# Patient Record
Sex: MALE | Race: BLACK OR AFRICAN AMERICAN
[De-identification: names, ages, dates, MRNs, and addresses within clinical notes are randomized per-mention and may not be internally consistent; named-entity substitution may affect disease eponyms.]

---

## 2017-09-25 ENCOUNTER — HOSPITAL ENCOUNTER (INPATIENT)
Dept: HOSPITAL 61 - ER | Age: 52
LOS: 4 days | Discharge: HOME HEALTH | DRG: 166 | End: 2017-09-29
Attending: INTERNAL MEDICINE | Admitting: INTERNAL MEDICINE
Payer: MEDICARE

## 2017-09-25 VITALS — HEIGHT: 71 IN | WEIGHT: 132 LBS | BODY MASS INDEX: 18.48 KG/M2

## 2017-09-25 VITALS — SYSTOLIC BLOOD PRESSURE: 149 MMHG | DIASTOLIC BLOOD PRESSURE: 100 MMHG

## 2017-09-25 DIAGNOSIS — Z86.11: ICD-10-CM

## 2017-09-25 DIAGNOSIS — K85.20: ICD-10-CM

## 2017-09-25 DIAGNOSIS — R74.8: ICD-10-CM

## 2017-09-25 DIAGNOSIS — K74.60: ICD-10-CM

## 2017-09-25 DIAGNOSIS — R79.89: ICD-10-CM

## 2017-09-25 DIAGNOSIS — K76.0: ICD-10-CM

## 2017-09-25 DIAGNOSIS — R59.0: ICD-10-CM

## 2017-09-25 DIAGNOSIS — R04.2: ICD-10-CM

## 2017-09-25 DIAGNOSIS — R64: ICD-10-CM

## 2017-09-25 DIAGNOSIS — J15.8: ICD-10-CM

## 2017-09-25 DIAGNOSIS — K70.10: ICD-10-CM

## 2017-09-25 DIAGNOSIS — D69.6: ICD-10-CM

## 2017-09-25 DIAGNOSIS — Z82.49: ICD-10-CM

## 2017-09-25 DIAGNOSIS — K21.9: ICD-10-CM

## 2017-09-25 DIAGNOSIS — F10.20: ICD-10-CM

## 2017-09-25 DIAGNOSIS — F12.90: ICD-10-CM

## 2017-09-25 DIAGNOSIS — F17.210: ICD-10-CM

## 2017-09-25 DIAGNOSIS — J44.0: Primary | ICD-10-CM

## 2017-09-25 DIAGNOSIS — I10: ICD-10-CM

## 2017-09-25 DIAGNOSIS — D70.9: ICD-10-CM

## 2017-09-25 DIAGNOSIS — K29.70: ICD-10-CM

## 2017-09-25 DIAGNOSIS — F41.9: ICD-10-CM

## 2017-09-25 DIAGNOSIS — K75.9: ICD-10-CM

## 2017-09-25 DIAGNOSIS — K70.9: ICD-10-CM

## 2017-09-25 LAB
ALBUMIN SERPL-MCNC: 3.9 G/DL (ref 3.4–5)
ALBUMIN/GLOB SERPL: 0.8 {RATIO} (ref 1–1.7)
ALP SERPL-CCNC: 117 U/L (ref 46–116)
ALT SERPL-CCNC: 241 U/L (ref 16–63)
ANION GAP SERPL CALC-SCNC: 14 MMOL/L (ref 6–14)
AST SERPL-CCNC: 456 U/L (ref 15–37)
BASOPHILS # BLD AUTO: 0 X10^3/UL (ref 0–0.2)
BASOPHILS NFR BLD: 1 % (ref 0–3)
BILIRUB SERPL-MCNC: 0.6 MG/DL (ref 0.2–1)
BUN SERPL-MCNC: 6 MG/DL (ref 8–26)
BUN/CREAT SERPL: 9 (ref 6–20)
CALCIUM SERPL-MCNC: 8.8 MG/DL (ref 8.5–10.1)
CHLORIDE SERPL-SCNC: 94 MMOL/L (ref 98–107)
CO2 SERPL-SCNC: 28 MMOL/L (ref 21–32)
CREAT SERPL-MCNC: 0.7 MG/DL (ref 0.7–1.3)
EOSINOPHIL NFR BLD AUTO: 1 % (ref 0–5)
EOSINOPHIL NFR BLD: 1 % (ref 0–3)
ERYTHROCYTE [DISTWIDTH] IN BLOOD BY AUTOMATED COUNT: 14 % (ref 11.5–14.5)
GFR SERPLBLD BASED ON 1.73 SQ M-ARVRAT: 142.7 ML/MIN
GLOBULIN SER-MCNC: 4.7 G/DL (ref 2.2–3.8)
GLUCOSE SERPL-MCNC: 61 MG/DL (ref 70–99)
HCT VFR BLD CALC: 41.4 % (ref 39–53)
HGB BLD-MCNC: 14.2 G/DL (ref 13–17.5)
INR PPP: 0.9 (ref 0.8–1.1)
LYMPHOCYTES # BLD: 0.9 X10^3/UL (ref 1–4.8)
LYMPHOCYTES NFR BLD AUTO: 38 % (ref 24–48)
MCH RBC QN AUTO: 33 PG (ref 25–35)
MCHC RBC AUTO-ENTMCNC: 34 G/DL (ref 31–37)
MCV RBC AUTO: 96 FL (ref 79–100)
MONOCYTES NFR BLD: 19 % (ref 0–9)
NEUTROPHILS NFR BLD AUTO: 42 % (ref 31–73)
PLATELET # BLD AUTO: 76 X10^3/UL (ref 140–400)
PLATELET # BLD EST: (no result) 10*3/UL
POTASSIUM SERPL-SCNC: 4.4 MMOL/L (ref 3.5–5.1)
PROT SERPL-MCNC: 8.6 G/DL (ref 6.4–8.2)
PROTHROMBIN TIME: 12 SEC (ref 11.7–14)
RBC # BLD AUTO: 4.31 X10^6/UL (ref 4.3–5.7)
SODIUM SERPL-SCNC: 136 MMOL/L (ref 136–145)
WBC # BLD AUTO: 2.3 X10^3/UL (ref 4–11)

## 2017-09-25 PROCEDURE — 85025 COMPLETE CBC W/AUTO DIFF WBC: CPT

## 2017-09-25 PROCEDURE — 87015 SPECIMEN INFECT AGNT CONCNTJ: CPT

## 2017-09-25 PROCEDURE — 82150 ASSAY OF AMYLASE: CPT

## 2017-09-25 PROCEDURE — 94640 AIRWAY INHALATION TREATMENT: CPT

## 2017-09-25 PROCEDURE — 80074 ACUTE HEPATITIS PANEL: CPT

## 2017-09-25 PROCEDURE — 88312 SPECIAL STAINS GROUP 1: CPT

## 2017-09-25 PROCEDURE — 88305 TISSUE EXAM BY PATHOLOGIST: CPT

## 2017-09-25 PROCEDURE — 85610 PROTHROMBIN TIME: CPT

## 2017-09-25 PROCEDURE — 80048 BASIC METABOLIC PNL TOTAL CA: CPT

## 2017-09-25 PROCEDURE — 85651 RBC SED RATE NONAUTOMATED: CPT

## 2017-09-25 PROCEDURE — 85027 COMPLETE CBC AUTOMATED: CPT

## 2017-09-25 PROCEDURE — 86703 HIV-1/HIV-2 1 RESULT ANTBDY: CPT

## 2017-09-25 PROCEDURE — 31622 DX BRONCHOSCOPE/WASH: CPT

## 2017-09-25 PROCEDURE — 71010: CPT

## 2017-09-25 PROCEDURE — 87205 SMEAR GRAM STAIN: CPT

## 2017-09-25 PROCEDURE — 87535 HIV-1 PROBE&REVERSE TRNSCRPJ: CPT

## 2017-09-25 PROCEDURE — 85007 BL SMEAR W/DIFF WBC COUNT: CPT

## 2017-09-25 PROCEDURE — 87116 MYCOBACTERIA CULTURE: CPT

## 2017-09-25 PROCEDURE — 71275 CT ANGIOGRAPHY CHEST: CPT

## 2017-09-25 PROCEDURE — 86713 LEGIONELLA ANTIBODY: CPT

## 2017-09-25 PROCEDURE — 76705 ECHO EXAM OF ABDOMEN: CPT

## 2017-09-25 PROCEDURE — 83690 ASSAY OF LIPASE: CPT

## 2017-09-25 PROCEDURE — 88112 CYTOPATH CELL ENHANCE TECH: CPT

## 2017-09-25 PROCEDURE — 93005 ELECTROCARDIOGRAM TRACING: CPT

## 2017-09-25 PROCEDURE — 86702 HIV-2 ANTIBODY: CPT

## 2017-09-25 PROCEDURE — 86701 HIV-1ANTIBODY: CPT

## 2017-09-25 PROCEDURE — 80053 COMPREHEN METABOLIC PANEL: CPT

## 2017-09-25 PROCEDURE — 87102 FUNGUS ISOLATION CULTURE: CPT

## 2017-09-25 PROCEDURE — 36415 COLL VENOUS BLD VENIPUNCTURE: CPT

## 2017-09-25 PROCEDURE — 87252 VIRUS INOCULATION TISSUE: CPT

## 2017-09-25 NOTE — PHYS DOC
Past Medical History


Past Medical History:  Hypertension, TB


Past Surgical History:  No Surgical History


Alcohol Use:  Occasionally


Drug Use:  None





Adult General


Chief Complaint


Chief Complaint:  COUGH





HPI


HPI





Patient is a 51  year old M who presents with coughing up blood. Patient states 

that he start coughing up blood earlier this morning as been productive all 

day. Patient states he's coughing clumps of bright red blood and dark blood. 

Patient has a history of TB and was treated for it. Patient complains of some 

shortness of breath associated with this. Patient denies any chest pain. 

Patient saw on any blood thinners. Patient denies any nausea/vomiting/diarrhea. 

Patient has no other complaints.





Review of Systems


Review of Systems


GEN: Denies fevers, chills, sweats


HEENT: Denies blurred vision, sore throat


CV: Denies chest pain


RESP: Cough up blood


GI: Denies n/v/d


NEURO: Denies confusion, dizziness


MSK: Denies weakness, joint pain/swelling





Current Medications


Current Medications





Current Medications








 Medications


  (Trade)  Dose


 Ordered  Sig/Yg  Start Time


 Stop Time Status Last Admin


Dose Admin


 


 Info


  (Do NOT chart on


 this entry -- for


 MONITORING)  1 each  PRN DAILY  PRN  9/25/17 19:30


 9/27/17 19:29   


 


 


 Iohexol


  (Omnipaque 300


 Mg/ml)  75 ml  1X  ONCE  9/25/17 19:30


 9/25/17 19:31 DC 9/25/17 21:33


75 ML











Allergies


Allergies





Allergies








Coded Allergies Type Severity Reaction Last Updated Verified


 


  No Known Drug Allergies    9/25/17 No











Physical Exam


Physical Exam


GEN.:    No apparent distress.  Alert and oriented.


HEENT:    Head is normocephalic, atraumatic


NECK:    Supple.  


LUNGS:    Coarse breath sounds bilaterally.


HEART:    RRR, S1, S2 present.  Peripheral pulses intact


ABDOMEN:    Soft, nontender.  Positive bowel sounds.


EXTREMITIES:    Without any cyanosis.    


NEUROLOGIC:     Normal speech, normal tone


PSYCHIATRIC:    Normal affect, normal mood.


SKIN:   No ulcerations





Current Patient Data


Vital Signs





 Vital Signs








  Date Time  Temp Pulse Resp B/P (MAP) Pulse Ox O2 Delivery O2 Flow Rate FiO2


 


9/25/17 18:18 98.2 99 20 166/116 (133) 95 Room Air  





 98.2       








Lab Values





 Laboratory Tests








Test


  9/25/17


19:50 9/25/17


21:00


 


White Blood Count


  2.3 x10^3/uL


(4.0-11.0)  L 


 


 


Red Blood Count


  4.31 x10^6/uL


(4.30-5.70) 


 


 


Hemoglobin


  14.2 g/dL


(13.0-17.5) 


 


 


Hematocrit


  41.4 %


(39.0-53.0) 


 


 


Mean Corpuscular Volume


  96 fL ()


  


 


 


Mean Corpuscular Hemoglobin 33 pg (25-35)   


 


Mean Corpuscular Hemoglobin


Concent 34 g/dL


(31-37) 


 


 


Red Cell Distribution Width


  14.0 %


(11.5-14.5) 


 


 


Platelet Count


  76 x10^3/uL


(140-400)  L 


 


 


Neutrophils (%) (Auto) 42 % (31-73)   


 


Lymphocytes (%) (Auto) 38 % (24-48)   


 


Monocytes (%) (Auto) 19 % (0-9)  H 


 


Eosinophils (%) (Auto) 1 % (0-3)   


 


Basophils (%) (Auto) 1 % (0-3)   


 


Neutrophils # (Auto)


  1.0 x10^3uL


(1.8-7.7)  L 


 


 


Lymphocytes # (Auto)


  0.9 x10^3/uL


(1.0-4.8)  L 


 


 


Monocytes # (Auto)


  0.4 x10^3/uL


(0.0-1.1) 


 


 


Eosinophils # (Auto)


  0.0 x10^3/uL


(0.0-0.7) 


 


 


Basophils # (Auto)


  0.0 x10^3/uL


(0.0-0.2) 


 


 


Segmented Neutrophils % 49 % (35-66)   


 


Lymphocytes % 38 % (24-48)   


 


Atypical Lymphocytes %


(Manual) 1 % (0-0)  H


  


 


 


Monocytes % 11 % (0-10)  H 


 


Eosinophils % 1 % (0-5)   


 


Platelet Estimate


  Decreased


(ADEQUATE) 


 


 


Prothrombin Time


  12.0 SEC


(11.7-14.0) 


 


 


Prothrombin Time INR 0.9 (0.8-1.1)   


 


Sodium Level


  


  136 mmol/L


(136-145)


 


Potassium Level


  


  4.4 mmol/L


(3.5-5.1)


 


Chloride Level


  


  94 mmol/L


()  L


 


Carbon Dioxide Level


  


  28 mmol/L


(21-32)


 


Anion Gap  14 (6-14)  


 


Blood Urea Nitrogen


  


  6 mg/dL (8-26)


L


 


Creatinine


  


  0.7 mg/dL


(0.7-1.3)


 


Estimated GFR


(Cockcroft-Gault) 


  142.7  


 


 


BUN/Creatinine Ratio  9 (6-20)  


 


Glucose Level


  


  61 mg/dL


(70-99)  L


 


Calcium Level


  


  8.8 mg/dL


(8.5-10.1)


 


Total Bilirubin


  


  0.6 mg/dL


(0.2-1.0)


 


Aspartate Amino Transferase


(AST) 


  456 U/L


(15-37)  H


 


Alanine Aminotransferase (ALT)


  


  241 U/L


(16-63)  H


 


Alkaline Phosphatase


  


  117 U/L


()  H


 


Total Protein


  


  8.6 g/dL


(6.4-8.2)  H


 


Albumin


  


  3.9 g/dL


(3.4-5.0)


 


Albumin/Globulin Ratio


  


  0.8 (1.0-1.7)


L


 


Lipase


  


  410 U/L


()  H





 Laboratory Tests


9/25/17 19:50








 Laboratory Tests


9/25/17 21:00














EKG


EKG


1951: EKG shows normal sinus rhythm rate of 75 no STEMI[]





Radiology/Procedures


Radiology/Procedures


CT chest:


IMPRESSION:


1. 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


2. There is masslike consolidation at the right lung apex with areas of 


central air cavitation, progressed since the prior examination from July 4, 2013. There is volume loss and scarring involving the right lung with 


multiple parenchymal nodular opacities. Findings may be seen in the 


setting of aspergillosis or secondary tuberculosis. Alternate etiology 


would be a primary lung malignancy. Additional considerations may include 


an inflammatory process such as a pneumoconiosis/silicosis with 


superimposed fungal infection. Hyperdense mediastinal lymphadenopathy is 


present.


3. Diffuse hepatic steatosis.





Chest x-ray shows right upper lobe cavitary lesion





Course & Med Decision Making


Course & Med Decision Making


Pertinent Labs and Imaging studies reviewed. (See chart for details)





ED course:


Patient was seen and examined emergency room CBC, CMP, troponin, EKG, chest x-

ray, CT scan of the chest was ordered


2230: Patient was updated on CT findings and plan to admit for possible active 

TB


2240: Discussed CC/HP/PMH with Dr. Holden and recommends admit and place 

infectious disease and pulmonary consult


2248: Discussed CC/HP/PMH with Dr. Schaefer and recommends admit to medicine





[]





Dragon Disclaimer


Dragon Disclaimer


This electronic medical record was generated, in whole or in part, using a 

voice recognition dictation system.





Departure


Departure


Impression:  


 Primary Impression:  


 Active tuberculosis


 Additional Impression:  


 Hemoptysis


Disposition:  09 ADMITTED AS INPATIENT


Admitting Physician:  Martin Holden


Condition:  STABLE


Referrals:  


GAEL HARDIN MD (PCP)





Problem Qualifiers











EMEKA PRICE DO Sep 25, 2017 20:06

## 2017-09-25 NOTE — RAD
PQRS Compliance Statement:

 

One or more of the following individualized dose reduction techniques were

utilized for this examination:  

1. Automated exposure control  

2. Adjustment of the mA and/or kV according to patient size  

3. Use of iterative reconstruction technique

 

CT angiography chest 9/25/2017 at 9:34 PM

 

INDICATION: Hemoptysis

 

COMPARISON: Chest CT July 4, 2013

 

TECHNIQUE: Multiple axial CT images of the chest were obtained after 

intravenous administration of 75 mL Omnipaque 300. Coronal and sagittal 

reformats are provided. Maximum intensity projection images are provided.

 

FINDINGS:

 

The thyroid gland is normal in appearance.

 

There are no pathologically enlarged or abnormally enhancing axillary, 

mediastinal or hilar lymph nodes. Calcified right hilar lymphadenopathy is

present. Right paratracheal hyperdense lymphadenopathy measuring 3.1 x 1.8

cm (series 3, image 51 and measuring 3.1 x 1.5 cm on series 3, image 60). 

Additional region measures 2.6 x 1.9 cm (series 3, image 67). 

 

There is adequate opacification of the pulmonary arterial system. There is

complete occlusion of the right upper lobe are pulmonary artery. Thoracic 

aorta is normal in course and caliber. Heart size is within normal limits.

No pericardial effusion.

 

There is masslike consolidation at the right lung apex with central area 

of cavitation. There is an adjacent area of nodular parenchymal mass 

measuring 2.7 x 1.7 cm. There is moderate to advanced pulmonary emphysema 

involving the right lung. There is volume loss and scarring in the right 

lung. Multiple scattered nodular opacities are identified, noncalcified. 

Mild centrilobular emphysema is noted in the left lung with compensatory 

expansion. No pleural effusions are identified. No pulmonary vascular 

congestion or pneumothorax.

 

Hypoattenuation of the hepatic parenchyma suggestive of diffuse hepatic 

steatosis. Spleen, bilateral adrenal glands and visualized portions of the

kidneys and pancreas are normal in appearance.

 

No suspicious osseous lesions are identified.

 

IMPRESSION:

1. 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

2. There is masslike consolidation at the right lung apex with areas of 

central air cavitation, progressed since the prior examination from July 4, 2013. There is volume loss and scarring involving the right lung with 

multiple parenchymal nodular opacities. Findings may be seen in the 

setting of aspergillosis or secondary tuberculosis. Alternate etiology 

would be a primary lung malignancy. Additional considerations may include 

an inflammatory process such as a pneumoconiosis/silicosis with 

superimposed fungal infection. Hyperdense mediastinal lymphadenopathy is 

present.

3. Diffuse hepatic steatosis.

 

**********FOR INTERNAL CODING PURPOSES**********

 

Critical result:

 

Findings discussed with EMEKA PRICE at 9/25/2017 10:02 PM.

 

RESULT CODE: (C)

 

Electronically signed by: Alba Gan MD (9/25/2017 10:21 PM) 

[facility]

## 2017-09-26 VITALS — DIASTOLIC BLOOD PRESSURE: 84 MMHG | SYSTOLIC BLOOD PRESSURE: 120 MMHG

## 2017-09-26 VITALS — DIASTOLIC BLOOD PRESSURE: 124 MMHG | SYSTOLIC BLOOD PRESSURE: 165 MMHG

## 2017-09-26 VITALS — SYSTOLIC BLOOD PRESSURE: 146 MMHG | DIASTOLIC BLOOD PRESSURE: 114 MMHG

## 2017-09-26 VITALS — SYSTOLIC BLOOD PRESSURE: 143 MMHG | DIASTOLIC BLOOD PRESSURE: 104 MMHG

## 2017-09-26 VITALS — DIASTOLIC BLOOD PRESSURE: 110 MMHG | SYSTOLIC BLOOD PRESSURE: 162 MMHG

## 2017-09-26 LAB
ANION GAP SERPL CALC-SCNC: 17 MMOL/L (ref 6–14)
BASOPHILS # BLD AUTO: 0 X10^3/UL (ref 0–0.2)
BASOPHILS NFR BLD: 1 % (ref 0–3)
BUN SERPL-MCNC: 8 MG/DL (ref 8–26)
CALCIUM SERPL-MCNC: 8.9 MG/DL (ref 8.5–10.1)
CHLORIDE SERPL-SCNC: 94 MMOL/L (ref 98–107)
CO2 SERPL-SCNC: 26 MMOL/L (ref 21–32)
CREAT SERPL-MCNC: 0.7 MG/DL (ref 0.7–1.3)
EOSINOPHIL NFR BLD: 0 % (ref 0–3)
ERYTHROCYTE [DISTWIDTH] IN BLOOD BY AUTOMATED COUNT: 14 % (ref 11.5–14.5)
GFR SERPLBLD BASED ON 1.73 SQ M-ARVRAT: 142.7 ML/MIN
GLUCOSE SERPL-MCNC: 68 MG/DL (ref 70–99)
HCT VFR BLD CALC: 42.1 % (ref 39–53)
HGB BLD-MCNC: 14.3 G/DL (ref 13–17.5)
LYMPHOCYTES # BLD: 0.5 X10^3/UL (ref 1–4.8)
LYMPHOCYTES NFR BLD AUTO: 13 % (ref 24–48)
MCH RBC QN AUTO: 33 PG (ref 25–35)
MCHC RBC AUTO-ENTMCNC: 34 G/DL (ref 31–37)
MCV RBC AUTO: 97 FL (ref 79–100)
MONOCYTES NFR BLD: 13 % (ref 0–9)
NEUTROPHILS NFR BLD AUTO: 73 % (ref 31–73)
PLATELET # BLD AUTO: 39 X10^3/UL (ref 140–400)
POTASSIUM SERPL-SCNC: 4 MMOL/L (ref 3.5–5.1)
RBC # BLD AUTO: 4.35 X10^6/UL (ref 4.3–5.7)
SODIUM SERPL-SCNC: 137 MMOL/L (ref 136–145)
WBC # BLD AUTO: 3.9 X10^3/UL (ref 4–11)

## 2017-09-26 RX ADMIN — PANTOPRAZOLE SODIUM SCH MG: 40 TABLET, DELAYED RELEASE ORAL at 10:00

## 2017-09-26 RX ADMIN — ONDANSETRON PRN MG: 2 INJECTION INTRAMUSCULAR; INTRAVENOUS at 17:38

## 2017-09-26 RX ADMIN — MORPHINE SULFATE PRN MG: 4 INJECTION, SOLUTION INTRAMUSCULAR; INTRAVENOUS at 20:50

## 2017-09-26 RX ADMIN — ONDANSETRON PRN MG: 2 INJECTION INTRAMUSCULAR; INTRAVENOUS at 00:20

## 2017-09-26 RX ADMIN — MORPHINE SULFATE PRN MG: 4 INJECTION, SOLUTION INTRAMUSCULAR; INTRAVENOUS at 00:15

## 2017-09-26 NOTE — EKG
[facility]

              [address] [place] [address]

Test Date:    2017               Test Time:    19:43:28

Pat Name:     CHRISTOPHER HUBBARD             Department:   

Patient ID:   [id number]           Room:         [facility]

Gender:       M                        Technician:   

:          [id number]               Requested By: EMEKA PRICE

Order Number: [id number]            Reading MD:   Lamine Fritz

                                 Measurements

Intervals                              Axis          

Rate:         75                       P:            67

NV:           182                      QRS:          110

QRSD:         88                       T:            80

QT:           412                                    

QTc:          463                                    

                           Interpretive Statements

SINUS RHYTHM

RIGHTWARD AXIS

S1,S2,S3 PATTERN

QRS(T) CONTOUR ABNORMALITY

CANNOT RULE OUT ANTEROSEPTAL MYOCARDIAL DAMAGE

T ABNORMALITY IN ANTERIOR LEADS

RI6.01          Unconfirmed report

Compared to ECG 2013 14:24:27

T-wave abnormality now present



Electronically Signed On 10-6-2017 12:35:12 CDT by Lamine Fritz

## 2017-09-26 NOTE — RAD
Examination: Frontal view of the chest 



 history: History of cough, hemoptysis.



 Comparison: 7/3/2013



Findings:



Masslike consolidation identified in the right apical lung with questionable

area of central cavitation. Volume loss and scarring identified in the right

lower lobe of the lung.



Faint scattered nodules identified in the bilateral lungs.



Hyperinflated lungs likely emphysematous changes



Impression:



1. Region of opacification identified in the right upper lobe of the lung

could be volume loss or consolidation or chronic infectious etiology grossly

similar to prior exam from 2013.

## 2017-09-26 NOTE — PDOC1
History and Physical


Date of Admission


Date of Admission


9/25/17





Identification/Chief Complaint


Chief Complaint


coughing up blood


Problems:  





Source


Source:  Chart review, Patient





History of Present Illness


History of Present Illness


Patient is a 51  year old M who presents with coughing up blood. Patient states 

that he start coughing up blood earlier this morning as been productive all 

day. Patient states he's coughing clumps of bright red blood and dark blood. 

Patient has a history of TB and was treated for it for 9 month in the past. 

Patient complains of some shortness of breath associated with this. Patient 

denies any chest pain. Patient not on any blood thinners. Patient denies any 

nausea/vomiting/diarrhea. he continue to smoke , he reports some wt loss even 

though he always has been thin





Past Medical History


Cardiovascular:  HTN


Pulmonary:  COPD, Other (TB)


CENTRAL NERVOUS SYSTEM:  Other (tremors and anxiety)


GI:  GERD


Heme/Onc:  No pertinent hx


Hepatobiliary:  Other (early cirrhosis was valuated at hepatology clinic at , 

alcoholic liver disease)


Psych:  Anxiety


Rheumatologic:  No pertinent hx


Infectious disease:  Other (TB)


ENT:  No pertinent hx


Renal/:  No pertinent hx


Endocrine:  No pertinent hx


Dermatology:  No pertinent hx





Past Surgical History


Past Surgical History:  No pertinent history





Family History


Family History:  Hypertension





Social History


Smoke:  1 pack per day


ALCOHOL:  heavy


Drugs:  Marijuana





Current Problem List


Problem List


Problems


Medical Problems:


(1) Active tuberculosis


Status: Acute  





(2) Hemoptysis


Status: Acute  











Current Medications


Current Medications





Current Medications








 Medications


  (Trade)  Dose


 Ordered  Sig/Yg  Start Time


 Stop Time Status Last Admin


Dose Admin


 


 Info


  (Do NOT chart on


 this entry -- for


 MONITORING)  1 each  PRN DAILY  PRN  9/25/17 19:30


 9/27/17 19:29   


 


 


 Iohexol


  (Omnipaque 300


 Mg/ml)  75 ml  1X  ONCE  9/25/17 19:30


 9/25/17 19:31 DC 9/25/17 21:33


75 ML


 


 Morphine Sulfate  4 mg  PRN Q2HR  PRN  9/25/17 23:00


 9/26/17 22:59  9/26/17 00:15


4 MG


 


 Ondansetron HCl


  (Zofran)  4 mg  PRN Q8HRS  PRN  9/25/17 23:00


 9/26/17 22:59  9/26/17 00:20


4 MG











Allergies


Allergies





Allergies








Coded Allergies Type Severity Reaction Last Updated Verified


 


  No Known Drug Allergies    9/25/17 No











ROS


Review of System


CONSTITUTIONAL:        No fever or chills


EYES:                          No recent changes


SKIN:               No rash or itching


CARDIOVASCULAR:     No chest pain, syncope, palpitations, or edema


RESPIRATORY:            + cough and hemoptysis


GASTROINTESTINAL:    No nausea, vomiting some epigastric pain and decrease 

appetite


NEUROLOGICAL:          No headaches or weakness


ENDOCRINE:               No cold or heat intolerance


GENITOURINARY:         No urgency or frequency of urination


MUSCULOSKELETAL:   No back pain or joint pain


LYMPHATICS:               No enlarged lymph nodes





Physical Exam


Physical Exam


GEN.:    No apparent distress.  Alert and oriented.


HEENT:    Head is normocephalic, atraumatic


NECK:    Supple.  


LUNGS:    decrease BS


HEART:    RRR, S1, S2 present.  Peripheral pulses intact


ABDOMEN:    Soft, tender in epigastric and LUQ area.  Positive bowel sounds.


EXTREMITIES:    Without any cyanosis.    


NEUROLOGIC:     Normal speech, normal tone


PSYCHIATRIC:    Normal affect, normal mood.


SKIN:   No ulcerations





Vitals


Vitals





Vital Signs








  Date Time  Temp Pulse Resp B/P (MAP) Pulse Ox O2 Delivery O2 Flow Rate FiO2


 


9/26/17 07:20 97.9 81 18 143/104 (117) 96 Room Air  





 97.9       











Labs


Labs





Laboratory Tests








Test


  9/25/17


19:50 9/25/17


21:00 9/26/17


08:15


 


White Blood Count


  2.3 x10^3/uL


(4.0-11.0) 


  


 


 


Red Blood Count


  4.31 x10^6/uL


(4.30-5.70) 


  


 


 


Hemoglobin


  14.2 g/dL


(13.0-17.5) 


  


 


 


Hematocrit


  41.4 %


(39.0-53.0) 


  


 


 


Mean Corpuscular Volume 96 fL ()   


 


Mean Corpuscular Hemoglobin 33 pg (25-35)   


 


Mean Corpuscular Hemoglobin


Concent 34 g/dL


(31-37) 


  


 


 


Red Cell Distribution Width


  14.0 %


(11.5-14.5) 


  


 


 


Platelet Count


  76 x10^3/uL


(140-400) 


  


 


 


Neutrophils (%) (Auto) 42 % (31-73)   


 


Lymphocytes (%) (Auto) 38 % (24-48)   


 


Monocytes (%) (Auto) 19 % (0-9)   


 


Eosinophils (%) (Auto) 1 % (0-3)   


 


Basophils (%) (Auto) 1 % (0-3)   


 


Neutrophils # (Auto)


  1.0 x10^3uL


(1.8-7.7) 


  


 


 


Lymphocytes # (Auto)


  0.9 x10^3/uL


(1.0-4.8) 


  


 


 


Monocytes # (Auto)


  0.4 x10^3/uL


(0.0-1.1) 


  


 


 


Eosinophils # (Auto)


  0.0 x10^3/uL


(0.0-0.7) 


  


 


 


Basophils # (Auto)


  0.0 x10^3/uL


(0.0-0.2) 


  


 


 


Segmented Neutrophils % 49 % (35-66)   


 


Lymphocytes % 38 % (24-48)   


 


Atypical Lymphocytes %


(Manual) 1 % (0-0) 


  


  


 


 


Monocytes % 11 % (0-10)   


 


Eosinophils % 1 % (0-5)   


 


Platelet Estimate


  Decreased


(ADEQUATE) 


  


 


 


Prothrombin Time


  12.0 SEC


(11.7-14.0) 


  


 


 


Prothromb Time International


Ratio 0.9 (0.8-1.1) 


  


  


 


 


Sodium Level


  


  136 mmol/L


(136-145) 137 mmol/L


(136-145)


 


Potassium Level


  


  4.4 mmol/L


(3.5-5.1) 4.0 mmol/L


(3.5-5.1)


 


Chloride Level


  


  94 mmol/L


() 94 mmol/L


()


 


Carbon Dioxide Level


  


  28 mmol/L


(21-32) 26 mmol/L


(21-32)


 


Anion Gap  14 (6-14)  17 (6-14) 


 


Blood Urea Nitrogen  6 mg/dL (8-26)  8 mg/dL (8-26) 


 


Creatinine


  


  0.7 mg/dL


(0.7-1.3) 0.7 mg/dL


(0.7-1.3)


 


Estimated GFR


(Cockcroft-Gault) 


  142.7 


  142.7 


 


 


BUN/Creatinine Ratio  9 (6-20)  


 


Glucose Level


  


  61 mg/dL


(70-99) 68 mg/dL


(70-99)


 


Calcium Level


  


  8.8 mg/dL


(8.5-10.1) 8.9 mg/dL


(8.5-10.1)


 


Total Bilirubin


  


  0.6 mg/dL


(0.2-1.0) 


 


 


Aspartate Amino Transf


(AST/SGOT) 


  456 U/L


(15-37) 


 


 


Alanine Aminotransferase


(ALT/SGPT) 


  241 U/L


(16-63) 


 


 


Alkaline Phosphatase


  


  117 U/L


() 


 


 


Total Protein


  


  8.6 g/dL


(6.4-8.2) 


 


 


Albumin


  


  3.9 g/dL


(3.4-5.0) 


 


 


Albumin/Globulin Ratio  0.8 (1.0-1.7)  


 


Lipase


  


  410 U/L


() 


 








Laboratory Tests








Test


  9/25/17


19:50 9/25/17


21:00 9/26/17


08:15


 


White Blood Count


  2.3 x10^3/uL


(4.0-11.0) 


  


 


 


Red Blood Count


  4.31 x10^6/uL


(4.30-5.70) 


  


 


 


Hemoglobin


  14.2 g/dL


(13.0-17.5) 


  


 


 


Hematocrit


  41.4 %


(39.0-53.0) 


  


 


 


Mean Corpuscular Volume 96 fL ()   


 


Mean Corpuscular Hemoglobin 33 pg (25-35)   


 


Mean Corpuscular Hemoglobin


Concent 34 g/dL


(31-37) 


  


 


 


Red Cell Distribution Width


  14.0 %


(11.5-14.5) 


  


 


 


Platelet Count


  76 x10^3/uL


(140-400) 


  


 


 


Neutrophils (%) (Auto) 42 % (31-73)   


 


Lymphocytes (%) (Auto) 38 % (24-48)   


 


Monocytes (%) (Auto) 19 % (0-9)   


 


Eosinophils (%) (Auto) 1 % (0-3)   


 


Basophils (%) (Auto) 1 % (0-3)   


 


Neutrophils # (Auto)


  1.0 x10^3uL


(1.8-7.7) 


  


 


 


Lymphocytes # (Auto)


  0.9 x10^3/uL


(1.0-4.8) 


  


 


 


Monocytes # (Auto)


  0.4 x10^3/uL


(0.0-1.1) 


  


 


 


Eosinophils # (Auto)


  0.0 x10^3/uL


(0.0-0.7) 


  


 


 


Basophils # (Auto)


  0.0 x10^3/uL


(0.0-0.2) 


  


 


 


Segmented Neutrophils % 49 % (35-66)   


 


Lymphocytes % 38 % (24-48)   


 


Atypical Lymphocytes %


(Manual) 1 % (0-0) 


  


  


 


 


Monocytes % 11 % (0-10)   


 


Eosinophils % 1 % (0-5)   


 


Platelet Estimate


  Decreased


(ADEQUATE) 


  


 


 


Prothrombin Time


  12.0 SEC


(11.7-14.0) 


  


 


 


Prothromb Time International


Ratio 0.9 (0.8-1.1) 


  


  


 


 


Sodium Level


  


  136 mmol/L


(136-145) 137 mmol/L


(136-145)


 


Potassium Level


  


  4.4 mmol/L


(3.5-5.1) 4.0 mmol/L


(3.5-5.1)


 


Chloride Level


  


  94 mmol/L


() 94 mmol/L


()


 


Carbon Dioxide Level


  


  28 mmol/L


(21-32) 26 mmol/L


(21-32)


 


Anion Gap  14 (6-14)  17 (6-14) 


 


Blood Urea Nitrogen  6 mg/dL (8-26)  8 mg/dL (8-26) 


 


Creatinine


  


  0.7 mg/dL


(0.7-1.3) 0.7 mg/dL


(0.7-1.3)


 


Estimated GFR


(Cockcroft-Gault) 


  142.7 


  142.7 


 


 


BUN/Creatinine Ratio  9 (6-20)  


 


Glucose Level


  


  61 mg/dL


(70-99) 68 mg/dL


(70-99)


 


Calcium Level


  


  8.8 mg/dL


(8.5-10.1) 8.9 mg/dL


(8.5-10.1)


 


Total Bilirubin


  


  0.6 mg/dL


(0.2-1.0) 


 


 


Aspartate Amino Transf


(AST/SGOT) 


  456 U/L


(15-37) 


 


 


Alanine Aminotransferase


(ALT/SGPT) 


  241 U/L


(16-63) 


 


 


Alkaline Phosphatase


  


  117 U/L


() 


 


 


Total Protein


  


  8.6 g/dL


(6.4-8.2) 


 


 


Albumin


  


  3.9 g/dL


(3.4-5.0) 


 


 


Albumin/Globulin Ratio  0.8 (1.0-1.7)  


 


Lipase


  


  410 U/L


() 


 











VTE Prophylaxis Ordered


VTE Prophylaxis Devices:  Yes


VTE Pharmacological Prophylaxi:  Yes





Assessment/Plan


Assessment/Plan


1- lung mass with cavitation


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


3- hepatitis ? alcoholic vs viral , check lab and HIV


4-weight loss


5-neutropenia and thrombocytopenia


6-Gastritis start PPI and antiemetics 


consult ID and pulonary , symptomatic treatment and DT prophylaxis











TROY HAMMER MD Sep 26, 2017 09:47

## 2017-09-26 NOTE — PDOC
Infectious Disease Note


Vital Sign


Vital Signs





Vital Signs








  Date Time  Temp Pulse Resp B/P (MAP) Pulse Ox O2 Delivery O2 Flow Rate FiO2


 


9/26/17 07:20 97.9 81 18 143/104 (117) 96 Room Air  





 97.9       











Labs


Lab





Laboratory Tests








Test


  9/25/17


19:50 9/25/17


21:00 9/26/17


08:15


 


White Blood Count


  2.3 x10^3/uL


(4.0-11.0) 


  3.9 x10^3/uL


(4.0-11.0)


 


Red Blood Count


  4.31 x10^6/uL


(4.30-5.70) 


  4.35 x10^6/uL


(4.30-5.70)


 


Hemoglobin


  14.2 g/dL


(13.0-17.5) 


  14.3 g/dL


(13.0-17.5)


 


Hematocrit


  41.4 %


(39.0-53.0) 


  42.1 %


(39.0-53.0)


 


Mean Corpuscular Volume 96 fL ()   97 fL () 


 


Mean Corpuscular Hemoglobin 33 pg (25-35)   33 pg (25-35) 


 


Mean Corpuscular Hemoglobin


Concent 34 g/dL


(31-37) 


  34 g/dL


(31-37)


 


Red Cell Distribution Width


  14.0 %


(11.5-14.5) 


  14.0 %


(11.5-14.5)


 


Platelet Count


  76 x10^3/uL


(140-400) 


  39 x10^3/uL


(140-400)


 


Neutrophils (%) (Auto) 42 % (31-73)   73 % (31-73) 


 


Lymphocytes (%) (Auto) 38 % (24-48)   13 % (24-48) 


 


Monocytes (%) (Auto) 19 % (0-9)   13 % (0-9) 


 


Eosinophils (%) (Auto) 1 % (0-3)   0 % (0-3) 


 


Basophils (%) (Auto) 1 % (0-3)   1 % (0-3) 


 


Neutrophils # (Auto)


  1.0 x10^3uL


(1.8-7.7) 


  2.8 x10^3uL


(1.8-7.7)


 


Lymphocytes # (Auto)


  0.9 x10^3/uL


(1.0-4.8) 


  0.5 x10^3/uL


(1.0-4.8)


 


Monocytes # (Auto)


  0.4 x10^3/uL


(0.0-1.1) 


  0.5 x10^3/uL


(0.0-1.1)


 


Eosinophils # (Auto)


  0.0 x10^3/uL


(0.0-0.7) 


  0.0 x10^3/uL


(0.0-0.7)


 


Basophils # (Auto)


  0.0 x10^3/uL


(0.0-0.2) 


  0.0 x10^3/uL


(0.0-0.2)


 


Segmented Neutrophils % 49 % (35-66)   


 


Lymphocytes % 38 % (24-48)   


 


Atypical Lymphocytes %


(Manual) 1 % (0-0) 


  


  


 


 


Monocytes % 11 % (0-10)   


 


Eosinophils % 1 % (0-5)   


 


Platelet Estimate


  Decreased


(ADEQUATE) 


  


 


 


Prothrombin Time


  12.0 SEC


(11.7-14.0) 


  


 


 


Prothromb Time International


Ratio 0.9 (0.8-1.1) 


  


  


 


 


Sodium Level


  


  136 mmol/L


(136-145) 137 mmol/L


(136-145)


 


Potassium Level


  


  4.4 mmol/L


(3.5-5.1) 4.0 mmol/L


(3.5-5.1)


 


Chloride Level


  


  94 mmol/L


() 94 mmol/L


()


 


Carbon Dioxide Level


  


  28 mmol/L


(21-32) 26 mmol/L


(21-32)


 


Anion Gap  14 (6-14)  17 (6-14) 


 


Blood Urea Nitrogen  6 mg/dL (8-26)  8 mg/dL (8-26) 


 


Creatinine


  


  0.7 mg/dL


(0.7-1.3) 0.7 mg/dL


(0.7-1.3)


 


Estimated GFR


(Cockcroft-Gault) 


  142.7 


  142.7 


 


 


BUN/Creatinine Ratio  9 (6-20)  


 


Glucose Level


  


  61 mg/dL


(70-99) 68 mg/dL


(70-99)


 


Calcium Level


  


  8.8 mg/dL


(8.5-10.1) 8.9 mg/dL


(8.5-10.1)


 


Total Bilirubin


  


  0.6 mg/dL


(0.2-1.0) 


 


 


Aspartate Amino Transf


(AST/SGOT) 


  456 U/L


(15-37) 


 


 


Alanine Aminotransferase


(ALT/SGPT) 


  241 U/L


(16-63) 


 


 


Alkaline Phosphatase


  


  117 U/L


() 


 


 


Total Protein


  


  8.6 g/dL


(6.4-8.2) 


 


 


Albumin


  


  3.9 g/dL


(3.4-5.0) 


 


 


Albumin/Globulin Ratio  0.8 (1.0-1.7)  


 


Lipase


  


  410 U/L


() 


 











Objective


Assessment





Hemoptysis


Cavitary lung infiltrate,, 


Chachexia


Leukopenia


Elevated LFT


h/o TB treated about 9 years ago





Plan


Plan of Care


need HIV


need bronch and biopsy and culture











HORTENCIA CLAYTON MD Sep 26, 2017 10:03

## 2017-09-26 NOTE — CONS
DATE OF CONSULTATION:  09/26/2017



REQUESTING PHYSICIAN:  Dr. Holden.



REASON FOR CONSULTATION:  Hemoptysis, possible TB.



HISTORY OF PRESENT ILLNESS:  This is a 51-year-old  gentleman

with history of tuberculosis, which was treated about 9-10 years ago at , who

came in with a 1-day history of hemoptysis.  The patient denies any weight loss,

denies any night sweats, fever or chills.  He does have some cough and started

coughing up blood, hence he came in.  The patient is admitted, has a right-sided

cavitary pneumonia with scarring and/or a mass.  The patient also has leukopenia

and thrombocytopenia.  The patient is alert, awake.  The patient denies any

fever.  The patient also has elevated liver function tests.  The patient denies

any nausea, vomiting, diarrhea, chest pain, shortness of breath, abdominal pain,

urinary symptoms or bowel symptoms.  The patient does not regularly see a

physician.  He does not take any medication at home.



PAST MEDICAL HISTORY:  Positive for history of pulmonary tuberculosis, which was

treated at  about 9-10 years ago, hypertension.



SOCIAL HISTORY:  Positive for significant alcohol use every day.  He drinks

about 6-8 beers a night.  Denies any drug use.  The patient is  and lives

with the wife.



ALLERGIES:  No known drug allergies.



CURRENT MEDICATIONS:  Reviewed.



REVIEW OF SYSTEMS:  As per HPI, all other systems reviewed are negative.



PHYSICAL EXAMINATION:

GENERAL:  Alert and oriented, thin, cachectic gentleman, not in distress.

VITAL SIGNS:  Stable, afebrile.

HEENT:  NAD.

NECK:  Supple, no JVP.

LYMPHATIC:  The patient does have small shotty lymph nodes in the neck, axilla

and inguinal region.

LUNGS:  Clear.

HEART:  S1, S2 regular.

ABDOMEN:  Benign.

EXTREMITIES:  No edema or cyanosis.

SKIN:  Unremarkable.

NEUROLOGIC:  The patient is neurologically intact.



LABORATORY DATA:  White count is 2.3, hemoglobin 14.2, platelets are 76,000. 

BUN and creatinine are normal.  His AST is 456, ALT is 241.



IMAGING STUDIES:  CT chest showed there is complete occlusion of the right upper

lobar pulmonary artery.  There is a mass-like consolidation at the right lung

apex with areas of central air cavitation, which has progressed since the last

comparison was from 07/2013.  There is volume loss and scarring present.



IMPRESSION:

1.  Hemoptysis.

2.  Cavitary lung infiltrate on the right upper lobe, which could be just a scar

tissue from prior tuberculosis that was treated, could be malignancy, could be

reactivation of tuberculosis.

3.  Leukopenia, probably related to alcoholism and cirrhosis, but again this

gentleman could have human immunodeficiency virus that can show up like that.

4.  Elevated liver function tests, probably related to alcoholism.

5.  History of tuberculosis treated 9-10 years ago or so.



RECOMMENDATIONS:  We will get human immunodeficiency virus testing.  We will

need to have a bronchoscopy with biopsy and culture.  We will also do a

hepatitis screen.



Thank you very much, Dr. Holden, for giving me the opportunity to participate in

this patient's care.

 



______________________________

HORTENCIA CLAYTON MD



DR:  ELANA/nts  JOB#:  [id number] / [id number]

DD:  09/26/2017 10:10  DT:  09/26/2017 17:48

## 2017-09-26 NOTE — RAD
EXAM: Abdomen sonogram.



HISTORY: Abnormal liver function laboratory values.



TECHNIQUE: Sonographic imaging of the abdomen was performed.



COMPARISON: None.



FINDINGS: There is hepatic steatosis. No focal hepatic lesion is seen. The

gallbladder is unremarkable. The common bile duct is normal in caliber. The

right kidney measures 11.6 cm [id number] and is unremarkable. The pancreas

is obscured due to bowel gas. The inferior vena cava is patent. The aorta is

not assessed.



IMPRESSION:

1. Hepatic steatosis.

2. Obscured pancreas due to bowel gas.

## 2017-09-26 NOTE — PDOC
PULMONARY PROGRESS NOTES


Vitals





Vital Signs








  Date Time  Temp Pulse Resp B/P (MAP) Pulse Ox O2 Delivery O2 Flow Rate FiO2


 


9/26/17 11:38 97.8 75 18 146/114 (125) 97 Room Air  





 97.8       








Labs





Laboratory Tests








Test


  9/25/17


19:50 9/25/17


21:00 9/26/17


08:15


 


White Blood Count


  2.3 x10^3/uL


(4.0-11.0) 


  3.9 x10^3/uL


(4.0-11.0)


 


Red Blood Count


  4.31 x10^6/uL


(4.30-5.70) 


  4.35 x10^6/uL


(4.30-5.70)


 


Hemoglobin


  14.2 g/dL


(13.0-17.5) 


  14.3 g/dL


(13.0-17.5)


 


Hematocrit


  41.4 %


(39.0-53.0) 


  42.1 %


(39.0-53.0)


 


Mean Corpuscular Volume 96 fL ()   97 fL () 


 


Mean Corpuscular Hemoglobin 33 pg (25-35)   33 pg (25-35) 


 


Mean Corpuscular Hemoglobin


Concent 34 g/dL


(31-37) 


  34 g/dL


(31-37)


 


Red Cell Distribution Width


  14.0 %


(11.5-14.5) 


  14.0 %


(11.5-14.5)


 


Platelet Count


  76 x10^3/uL


(140-400) 


  39 x10^3/uL


(140-400)


 


Neutrophils (%) (Auto) 42 % (31-73)   73 % (31-73) 


 


Lymphocytes (%) (Auto) 38 % (24-48)   13 % (24-48) 


 


Monocytes (%) (Auto) 19 % (0-9)   13 % (0-9) 


 


Eosinophils (%) (Auto) 1 % (0-3)   0 % (0-3) 


 


Basophils (%) (Auto) 1 % (0-3)   1 % (0-3) 


 


Neutrophils # (Auto)


  1.0 x10^3uL


(1.8-7.7) 


  2.8 x10^3uL


(1.8-7.7)


 


Lymphocytes # (Auto)


  0.9 x10^3/uL


(1.0-4.8) 


  0.5 x10^3/uL


(1.0-4.8)


 


Monocytes # (Auto)


  0.4 x10^3/uL


(0.0-1.1) 


  0.5 x10^3/uL


(0.0-1.1)


 


Eosinophils # (Auto)


  0.0 x10^3/uL


(0.0-0.7) 


  0.0 x10^3/uL


(0.0-0.7)


 


Basophils # (Auto)


  0.0 x10^3/uL


(0.0-0.2) 


  0.0 x10^3/uL


(0.0-0.2)


 


Segmented Neutrophils % 49 % (35-66)   


 


Lymphocytes % 38 % (24-48)   


 


Atypical Lymphocytes %


(Manual) 1 % (0-0) 


  


  


 


 


Monocytes % 11 % (0-10)   


 


Eosinophils % 1 % (0-5)   


 


Platelet Estimate


  Decreased


(ADEQUATE) 


  


 


 


Prothrombin Time


  12.0 SEC


(11.7-14.0) 


  


 


 


Prothromb Time International


Ratio 0.9 (0.8-1.1) 


  


  


 


 


Sodium Level


  


  136 mmol/L


(136-145) 137 mmol/L


(136-145)


 


Potassium Level


  


  4.4 mmol/L


(3.5-5.1) 4.0 mmol/L


(3.5-5.1)


 


Chloride Level


  


  94 mmol/L


() 94 mmol/L


()


 


Carbon Dioxide Level


  


  28 mmol/L


(21-32) 26 mmol/L


(21-32)


 


Anion Gap  14 (6-14)  17 (6-14) 


 


Blood Urea Nitrogen  6 mg/dL (8-26)  8 mg/dL (8-26) 


 


Creatinine


  


  0.7 mg/dL


(0.7-1.3) 0.7 mg/dL


(0.7-1.3)


 


Estimated GFR


(Cockcroft-Gault) 


  142.7 


  142.7 


 


 


BUN/Creatinine Ratio  9 (6-20)  


 


Glucose Level


  


  61 mg/dL


(70-99) 68 mg/dL


(70-99)


 


Calcium Level


  


  8.8 mg/dL


(8.5-10.1) 8.9 mg/dL


(8.5-10.1)


 


Total Bilirubin


  


  0.6 mg/dL


(0.2-1.0) 


 


 


Aspartate Amino Transf


(AST/SGOT) 


  456 U/L


(15-37) 


 


 


Alanine Aminotransferase


(ALT/SGPT) 


  241 U/L


(16-63) 


 


 


Alkaline Phosphatase


  


  117 U/L


() 


 


 


Total Protein


  


  8.6 g/dL


(6.4-8.2) 


 


 


Albumin


  


  3.9 g/dL


(3.4-5.0) 


 


 


Albumin/Globulin Ratio  0.8 (1.0-1.7)  


 


Lipase


  


  410 U/L


() 


 








Laboratory Tests








Test


  9/25/17


19:50 9/25/17


21:00 9/26/17


08:15


 


White Blood Count


  2.3 x10^3/uL


(4.0-11.0) 


  3.9 x10^3/uL


(4.0-11.0)


 


Red Blood Count


  4.31 x10^6/uL


(4.30-5.70) 


  4.35 x10^6/uL


(4.30-5.70)


 


Hemoglobin


  14.2 g/dL


(13.0-17.5) 


  14.3 g/dL


(13.0-17.5)


 


Hematocrit


  41.4 %


(39.0-53.0) 


  42.1 %


(39.0-53.0)


 


Mean Corpuscular Volume 96 fL ()   97 fL () 


 


Mean Corpuscular Hemoglobin 33 pg (25-35)   33 pg (25-35) 


 


Mean Corpuscular Hemoglobin


Concent 34 g/dL


(31-37) 


  34 g/dL


(31-37)


 


Red Cell Distribution Width


  14.0 %


(11.5-14.5) 


  14.0 %


(11.5-14.5)


 


Platelet Count


  76 x10^3/uL


(140-400) 


  39 x10^3/uL


(140-400)


 


Neutrophils (%) (Auto) 42 % (31-73)   73 % (31-73) 


 


Lymphocytes (%) (Auto) 38 % (24-48)   13 % (24-48) 


 


Monocytes (%) (Auto) 19 % (0-9)   13 % (0-9) 


 


Eosinophils (%) (Auto) 1 % (0-3)   0 % (0-3) 


 


Basophils (%) (Auto) 1 % (0-3)   1 % (0-3) 


 


Neutrophils # (Auto)


  1.0 x10^3uL


(1.8-7.7) 


  2.8 x10^3uL


(1.8-7.7)


 


Lymphocytes # (Auto)


  0.9 x10^3/uL


(1.0-4.8) 


  0.5 x10^3/uL


(1.0-4.8)


 


Monocytes # (Auto)


  0.4 x10^3/uL


(0.0-1.1) 


  0.5 x10^3/uL


(0.0-1.1)


 


Eosinophils # (Auto)


  0.0 x10^3/uL


(0.0-0.7) 


  0.0 x10^3/uL


(0.0-0.7)


 


Basophils # (Auto)


  0.0 x10^3/uL


(0.0-0.2) 


  0.0 x10^3/uL


(0.0-0.2)


 


Segmented Neutrophils % 49 % (35-66)   


 


Lymphocytes % 38 % (24-48)   


 


Atypical Lymphocytes %


(Manual) 1 % (0-0) 


  


  


 


 


Monocytes % 11 % (0-10)   


 


Eosinophils % 1 % (0-5)   


 


Platelet Estimate


  Decreased


(ADEQUATE) 


  


 


 


Prothrombin Time


  12.0 SEC


(11.7-14.0) 


  


 


 


Prothromb Time International


Ratio 0.9 (0.8-1.1) 


  


  


 


 


Sodium Level


  


  136 mmol/L


(136-145) 137 mmol/L


(136-145)


 


Potassium Level


  


  4.4 mmol/L


(3.5-5.1) 4.0 mmol/L


(3.5-5.1)


 


Chloride Level


  


  94 mmol/L


() 94 mmol/L


()


 


Carbon Dioxide Level


  


  28 mmol/L


(21-32) 26 mmol/L


(21-32)


 


Anion Gap  14 (6-14)  17 (6-14) 


 


Blood Urea Nitrogen  6 mg/dL (8-26)  8 mg/dL (8-26) 


 


Creatinine


  


  0.7 mg/dL


(0.7-1.3) 0.7 mg/dL


(0.7-1.3)


 


Estimated GFR


(Cockcroft-Gault) 


  142.7 


  142.7 


 


 


BUN/Creatinine Ratio  9 (6-20)  


 


Glucose Level


  


  61 mg/dL


(70-99) 68 mg/dL


(70-99)


 


Calcium Level


  


  8.8 mg/dL


(8.5-10.1) 8.9 mg/dL


(8.5-10.1)


 


Total Bilirubin


  


  0.6 mg/dL


(0.2-1.0) 


 


 


Aspartate Amino Transf


(AST/SGOT) 


  456 U/L


(15-37) 


 


 


Alanine Aminotransferase


(ALT/SGPT) 


  241 U/L


(16-63) 


 


 


Alkaline Phosphatase


  


  117 U/L


() 


 


 


Total Protein


  


  8.6 g/dL


(6.4-8.2) 


 


 


Albumin


  


  3.9 g/dL


(3.4-5.0) 


 


 


Albumin/Globulin Ratio  0.8 (1.0-1.7)  


 


Lipase


  


  410 U/L


() 


 








Medications





Active Scripts








 Medications  Dose


 Route/Sig


 Max Daily Dose Days Date Category


 


 No Active


 Prescriptions or


 Reported


 Medications  


 


     Rx











Impression


.


full consult dictated


will proceed with bronch in am


reviewed R/B/A


pt accepted











NADIA ANNE MD Sep 26, 2017 15:07

## 2017-09-27 VITALS — SYSTOLIC BLOOD PRESSURE: 170 MMHG | DIASTOLIC BLOOD PRESSURE: 110 MMHG

## 2017-09-27 VITALS — DIASTOLIC BLOOD PRESSURE: 110 MMHG | SYSTOLIC BLOOD PRESSURE: 163 MMHG

## 2017-09-27 VITALS — SYSTOLIC BLOOD PRESSURE: 127 MMHG | DIASTOLIC BLOOD PRESSURE: 101 MMHG

## 2017-09-27 VITALS — SYSTOLIC BLOOD PRESSURE: 146 MMHG | DIASTOLIC BLOOD PRESSURE: 112 MMHG

## 2017-09-27 VITALS — SYSTOLIC BLOOD PRESSURE: 152 MMHG | DIASTOLIC BLOOD PRESSURE: 112 MMHG

## 2017-09-27 VITALS — DIASTOLIC BLOOD PRESSURE: 108 MMHG | SYSTOLIC BLOOD PRESSURE: 146 MMHG

## 2017-09-27 VITALS — DIASTOLIC BLOOD PRESSURE: 117 MMHG | SYSTOLIC BLOOD PRESSURE: 149 MMHG

## 2017-09-27 VITALS — SYSTOLIC BLOOD PRESSURE: 151 MMHG | DIASTOLIC BLOOD PRESSURE: 113 MMHG

## 2017-09-27 VITALS — SYSTOLIC BLOOD PRESSURE: 158 MMHG | DIASTOLIC BLOOD PRESSURE: 119 MMHG

## 2017-09-27 VITALS — SYSTOLIC BLOOD PRESSURE: 112 MMHG | DIASTOLIC BLOOD PRESSURE: 68 MMHG

## 2017-09-27 VITALS — SYSTOLIC BLOOD PRESSURE: 169 MMHG | DIASTOLIC BLOOD PRESSURE: 116 MMHG

## 2017-09-27 VITALS — SYSTOLIC BLOOD PRESSURE: 146 MMHG | DIASTOLIC BLOOD PRESSURE: 103 MMHG

## 2017-09-27 LAB
ALBUMIN SERPL-MCNC: 4.2 G/DL (ref 3.4–5)
ALBUMIN/GLOB SERPL: 0.8 {RATIO} (ref 1–1.7)
ALP SERPL-CCNC: 119 U/L (ref 46–116)
ALT SERPL-CCNC: 224 U/L (ref 16–63)
ANION GAP SERPL CALC-SCNC: 15 MMOL/L (ref 6–14)
AST SERPL-CCNC: 310 U/L (ref 15–37)
BILIRUB SERPL-MCNC: 1.1 MG/DL (ref 0.2–1)
BUN SERPL-MCNC: 15 MG/DL (ref 8–26)
BUN/CREAT SERPL: 17 (ref 6–20)
CALCIUM SERPL-MCNC: 10.2 MG/DL (ref 8.5–10.1)
CHLORIDE SERPL-SCNC: 90 MMOL/L (ref 98–107)
CO2 SERPL-SCNC: 29 MMOL/L (ref 21–32)
CREAT SERPL-MCNC: 0.9 MG/DL (ref 0.7–1.3)
ERYTHROCYTE [DISTWIDTH] IN BLOOD BY AUTOMATED COUNT: 13.9 % (ref 11.5–14.5)
GFR SERPLBLD BASED ON 1.73 SQ M-ARVRAT: 106.8 ML/MIN
GLOBULIN SER-MCNC: 5.2 G/DL (ref 2.2–3.8)
GLUCOSE SERPL-MCNC: 99 MG/DL (ref 70–99)
HCT VFR BLD CALC: 44.5 % (ref 39–53)
HGB BLD-MCNC: 15.2 G/DL (ref 13–17.5)
MCH RBC QN AUTO: 33 PG (ref 25–35)
MCHC RBC AUTO-ENTMCNC: 34 G/DL (ref 31–37)
MCV RBC AUTO: 97 FL (ref 79–100)
PLATELET # BLD AUTO: 40 X10^3/UL (ref 140–400)
POTASSIUM SERPL-SCNC: 4.6 MMOL/L (ref 3.5–5.1)
PROT SERPL-MCNC: 9.4 G/DL (ref 6.4–8.2)
RBC # BLD AUTO: 4.59 X10^6/UL (ref 4.3–5.7)
SODIUM SERPL-SCNC: 134 MMOL/L (ref 136–145)
WBC # BLD AUTO: 4.6 X10^3/UL (ref 4–11)

## 2017-09-27 PROCEDURE — 0B9C8ZX DRAINAGE OF RIGHT UPPER LUNG LOBE, VIA NATURAL OR ARTIFICIAL OPENING ENDOSCOPIC, DIAGNOSTIC: ICD-10-PCS | Performed by: INTERNAL MEDICINE

## 2017-09-27 RX ADMIN — FOLIC ACID SCH MLS/HR: 5 INJECTION, SOLUTION INTRAMUSCULAR; INTRAVENOUS; SUBCUTANEOUS at 20:41

## 2017-09-27 RX ADMIN — MORPHINE SULFATE PRN MG: 4 INJECTION, SOLUTION INTRAMUSCULAR; INTRAVENOUS at 18:41

## 2017-09-27 RX ADMIN — MORPHINE SULFATE PRN MG: 4 INJECTION, SOLUTION INTRAMUSCULAR; INTRAVENOUS at 20:41

## 2017-09-27 RX ADMIN — NICOTINE SCH PATCH: 21 PATCH, EXTENDED RELEASE TOPICAL at 14:21

## 2017-09-27 RX ADMIN — PANTOPRAZOLE SODIUM SCH MG: 40 TABLET, DELAYED RELEASE ORAL at 07:30

## 2017-09-27 NOTE — PDOC
PROGRESS NOTES


Subjective


Subjective


c/c - f/u of thrombocytopenia





Objective


Objective





Vital Signs








  Date Time  Temp Pulse Resp B/P (MAP) Pulse Ox O2 Delivery O2 Flow Rate FiO2


 


9/27/17 09:00  98  112/68    


 


9/27/17 07:43 98.5  19  99 Room Air  





 98.5       











Assessment


Assessment


Problems


Medical Problems:


(1) Active tuberculosis


Status: Acute  





(2) Hemoptysis


Status: Acute  





Assessment/Plan


1- thrombocytopenia reactive discussed with Dr. Holden- transfuse platelets 

only if clinical evidence of bleed.


Platelets today at 40.


2. Lung mass with cavitation - bronch today 9/27/17














Comment


Review of Relevant


I have reviewed the following items elizabeth (where applicable) has been applied.


Labs





Laboratory Tests








Test


  9/25/17


19:50 9/25/17


21:00 9/26/17


08:15 9/26/17


10:30


 


White Blood Count


  2.3 x10^3/uL


(4.0-11.0) 


  3.9 x10^3/uL


(4.0-11.0) 


 


 


Red Blood Count


  4.31 x10^6/uL


(4.30-5.70) 


  4.35 x10^6/uL


(4.30-5.70) 


 


 


Hemoglobin


  14.2 g/dL


(13.0-17.5) 


  14.3 g/dL


(13.0-17.5) 


 


 


Hematocrit


  41.4 %


(39.0-53.0) 


  42.1 %


(39.0-53.0) 


 


 


Mean Corpuscular Volume 96 fL ()   97 fL ()  


 


Mean Corpuscular Hemoglobin 33 pg (25-35)   33 pg (25-35)  


 


Mean Corpuscular Hemoglobin


Concent 34 g/dL


(31-37) 


  34 g/dL


(31-37) 


 


 


Red Cell Distribution Width


  14.0 %


(11.5-14.5) 


  14.0 %


(11.5-14.5) 


 


 


Platelet Count


  76 x10^3/uL


(140-400) 


  39 x10^3/uL


(140-400) 


 


 


Neutrophils (%) (Auto) 42 % (31-73)   73 % (31-73)  


 


Lymphocytes (%) (Auto) 38 % (24-48)   13 % (24-48)  


 


Monocytes (%) (Auto) 19 % (0-9)   13 % (0-9)  


 


Eosinophils (%) (Auto) 1 % (0-3)   0 % (0-3)  


 


Basophils (%) (Auto) 1 % (0-3)   1 % (0-3)  


 


Neutrophils # (Auto)


  1.0 x10^3uL


(1.8-7.7) 


  2.8 x10^3uL


(1.8-7.7) 


 


 


Lymphocytes # (Auto)


  0.9 x10^3/uL


(1.0-4.8) 


  0.5 x10^3/uL


(1.0-4.8) 


 


 


Monocytes # (Auto)


  0.4 x10^3/uL


(0.0-1.1) 


  0.5 x10^3/uL


(0.0-1.1) 


 


 


Eosinophils # (Auto)


  0.0 x10^3/uL


(0.0-0.7) 


  0.0 x10^3/uL


(0.0-0.7) 


 


 


Basophils # (Auto)


  0.0 x10^3/uL


(0.0-0.2) 


  0.0 x10^3/uL


(0.0-0.2) 


 


 


Segmented Neutrophils % 49 % (35-66)    


 


Lymphocytes % 38 % (24-48)    


 


Atypical Lymphocytes %


(Manual) 1 % (0-0) 


  


  


  


 


 


Monocytes % 11 % (0-10)    


 


Eosinophils % 1 % (0-5)    


 


Platelet Estimate


  Decreased


(ADEQUATE) 


  


  


 


 


Prothrombin Time


  12.0 SEC


(11.7-14.0) 


  


  


 


 


Prothromb Time International


Ratio 0.9 (0.8-1.1) 


  


  


  


 


 


Sodium Level


  


  136 mmol/L


(136-145) 137 mmol/L


(136-145) 


 


 


Potassium Level


  


  4.4 mmol/L


(3.5-5.1) 4.0 mmol/L


(3.5-5.1) 


 


 


Chloride Level


  


  94 mmol/L


() 94 mmol/L


() 


 


 


Carbon Dioxide Level


  


  28 mmol/L


(21-32) 26 mmol/L


(21-32) 


 


 


Anion Gap  14 (6-14)  17 (6-14)  


 


Blood Urea Nitrogen  6 mg/dL (8-26)  8 mg/dL (8-26)  


 


Creatinine


  


  0.7 mg/dL


(0.7-1.3) 0.7 mg/dL


(0.7-1.3) 


 


 


Estimated GFR


(Cockcroft-Gault) 


  142.7 


  142.7 


  


 


 


BUN/Creatinine Ratio  9 (6-20)   


 


Glucose Level


  


  61 mg/dL


(70-99) 68 mg/dL


(70-99) 


 


 


Calcium Level


  


  8.8 mg/dL


(8.5-10.1) 8.9 mg/dL


(8.5-10.1) 


 


 


Total Bilirubin


  


  0.6 mg/dL


(0.2-1.0) 


  


 


 


Aspartate Amino Transf


(AST/SGOT) 


  456 U/L


(15-37) 


  


 


 


Alanine Aminotransferase


(ALT/SGPT) 


  241 U/L


(16-63) 


  


 


 


Alkaline Phosphatase


  


  117 U/L


() 


  


 


 


Total Protein


  


  8.6 g/dL


(6.4-8.2) 


  


 


 


Albumin


  


  3.9 g/dL


(3.4-5.0) 


  


 


 


Albumin/Globulin Ratio  0.8 (1.0-1.7)   


 


Lipase


  


  410 U/L


() 


  


 


 


Hepatitis A IgM Antibody


  


  


  


  Negative


(Negative)


 


Hepatitis B Surface Antigen


  


  


  


  Negative


(Negative)


 


Hepatitis B Core IgM Antibody


  


  


  


  Negative


(Negative)


 


Hepatitis C Antibody


  


  


  


  <0.1 s/co


ratio


 


HIV (1&2) Antibody


  


  


  


  Non reactive


(Non Reactive)


 


Test


  9/27/17


07:39 


  


  


 


 


White Blood Count


  4.6 x10^3/uL


(4.0-11.0) 


  


  


 


 


Red Blood Count


  4.59 x10^6/uL


(4.30-5.70) 


  


  


 


 


Hemoglobin


  15.2 g/dL


(13.0-17.5) 


  


  


 


 


Hematocrit


  44.5 %


(39.0-53.0) 


  


  


 


 


Mean Corpuscular Volume 97 fL ()    


 


Mean Corpuscular Hemoglobin 33 pg (25-35)    


 


Mean Corpuscular Hemoglobin


Concent 34 g/dL


(31-37) 


  


  


 


 


Red Cell Distribution Width


  13.9 %


(11.5-14.5) 


  


  


 


 


Platelet Count


  40 x10^3/uL


(140-400) 


  


  


 


 


Sodium Level


  134 mmol/L


(136-145) 


  


  


 


 


Potassium Level


  4.6 mmol/L


(3.5-5.1) 


  


  


 


 


Chloride Level


  90 mmol/L


() 


  


  


 


 


Carbon Dioxide Level


  29 mmol/L


(21-32) 


  


  


 


 


Anion Gap 15 (6-14)    


 


Blood Urea Nitrogen


  15 mg/dL


(8-26) 


  


  


 


 


Creatinine


  0.9 mg/dL


(0.7-1.3) 


  


  


 


 


Estimated GFR


(Cockcroft-Gault) 106.8 


  


  


  


 


 


BUN/Creatinine Ratio 17 (6-20)    


 


Glucose Level


  99 mg/dL


(70-99) 


  


  


 


 


Calcium Level


  10.2 mg/dL


(8.5-10.1) 


  


  


 


 


Total Bilirubin


  1.1 mg/dL


(0.2-1.0) 


  


  


 


 


Aspartate Amino Transf


(AST/SGOT) 310 U/L


(15-37) 


  


  


 


 


Alanine Aminotransferase


(ALT/SGPT) 224 U/L


(16-63) 


  


  


 


 


Alkaline Phosphatase


  119 U/L


() 


  


  


 


 


Total Protein


  9.4 g/dL


(6.4-8.2) 


  


  


 


 


Albumin


  4.2 g/dL


(3.4-5.0) 


  


  


 


 


Albumin/Globulin Ratio 0.8 (1.0-1.7)    








Laboratory Tests








Test


  9/26/17


10:30 9/27/17


07:39


 


Hepatitis A IgM Antibody


  Negative


(Negative) 


 


 


Hepatitis B Surface Antigen


  Negative


(Negative) 


 


 


Hepatitis B Core IgM Antibody


  Negative


(Negative) 


 


 


Hepatitis C Antibody


  <0.1 s/co


ratio 


 


 


HIV (1&2) Antibody


  Non reactive


(Non Reactive) 


 


 


White Blood Count


  


  4.6 x10^3/uL


(4.0-11.0)


 


Red Blood Count


  


  4.59 x10^6/uL


(4.30-5.70)


 


Hemoglobin


  


  15.2 g/dL


(13.0-17.5)


 


Hematocrit


  


  44.5 %


(39.0-53.0)


 


Mean Corpuscular Volume  97 fL () 


 


Mean Corpuscular Hemoglobin  33 pg (25-35) 


 


Mean Corpuscular Hemoglobin


Concent 


  34 g/dL


(31-37)


 


Red Cell Distribution Width


  


  13.9 %


(11.5-14.5)


 


Platelet Count


  


  40 x10^3/uL


(140-400)


 


Sodium Level


  


  134 mmol/L


(136-145)


 


Potassium Level


  


  4.6 mmol/L


(3.5-5.1)


 


Chloride Level


  


  90 mmol/L


()


 


Carbon Dioxide Level


  


  29 mmol/L


(21-32)


 


Anion Gap  15 (6-14) 


 


Blood Urea Nitrogen


  


  15 mg/dL


(8-26)


 


Creatinine


  


  0.9 mg/dL


(0.7-1.3)


 


Estimated GFR


(Cockcroft-Gault) 


  106.8 


 


 


BUN/Creatinine Ratio  17 (6-20) 


 


Glucose Level


  


  99 mg/dL


(70-99)


 


Calcium Level


  


  10.2 mg/dL


(8.5-10.1)


 


Total Bilirubin


  


  1.1 mg/dL


(0.2-1.0)


 


Aspartate Amino Transf


(AST/SGOT) 


  310 U/L


(15-37)


 


Alanine Aminotransferase


(ALT/SGPT) 


  224 U/L


(16-63)


 


Alkaline Phosphatase


  


  119 U/L


()


 


Total Protein


  


  9.4 g/dL


(6.4-8.2)


 


Albumin


  


  4.2 g/dL


(3.4-5.0)


 


Albumin/Globulin Ratio  0.8 (1.0-1.7) 








Medications





Current Medications


Iohexol (Omnipaque 300 Mg/ml) 75 ml 1X  ONCE IV  Last administered on 9/25/17at 

21:33;  Start 9/25/17 at 19:30;  Stop 9/25/17 at 19:31;  Status DC


Info (Do NOT chart on this entry -- for MONITORING) 1 each PRN DAILY  PRN MC 

SEE COMMENTS;  Start 9/25/17 at 19:30;  Stop 9/27/17 at 19:29


Ondansetron HCl (Zofran) 4 mg PRN Q8HRS  PRN IV NAUSEA/VOMITING Last 

administered on 9/26/17at 17:38;  Start 9/25/17 at 23:00;  Stop 9/26/17 at 22:59

;  Status DC


Morphine Sulfate 4 mg PRN Q2HR  PRN IV PAIN Last administered on 9/26/17at 20:50

;  Start 9/25/17 at 23:00;  Stop 9/26/17 at 22:59;  Status DC


Pantoprazole Sodium (Protonix) 40 mg DAILYAC PO ;  Start 9/26/17 at 10:00


Enoxaparin Sodium (Lovenox 40mg Syringe) 40 mg Q24H SQ ;  Start 9/26/17 at 10:00

;  Stop 9/26/17 at 12:56;  Status DC


Chlordiazepoxide (Librium) 10 mg PRN Q6HRS  PRN PO ANXIETY / AGITATION;  Start 9 /26/17 at 09:45


Amlodipine Besylate (Norvasc) 5 mg DAILY PO  Last administered on 9/26/17at 15:

25;  Start 9/26/17 at 13:00


Morphine Sulfate 4 mg PRN Q2HR  PRN IV PAIN;  Start 9/26/17 at 21:30


Zolpidem Tartrate (Ambien) 5 mg PRN QHS  PRN PO INSOMNIA;  Start 9/26/17 at 21:

30


Promethazine HCl 12.5 mg/Sodium Chloride 50.5 ml @  151.5 mls/ hr PRN Q6HRS  

PRN IV NAUSEA/VOMITING;  Start 9/26/17 at 21:30





Active Scripts


Active


No Active Prescriptions or Reported Medications


Vitals/I & O





Vital Sign - Last 24 Hours








 9/26/17 9/26/17 9/26/17 9/26/17





 11:38 15:25 15:47 19:00


 


Temp 97.8  98.0 97.8





 97.8  98.0 97.8


 


Pulse 75 75 62 90


 


Resp 18  20 17


 


B/P (MAP) 146/114 (125) 146/114 162/110 (127) 165/124 (138)


 


Pulse Ox 97  97 98


 


O2 Delivery Room Air  Room Air Room Air


 


    





    





 9/26/17 9/26/17 9/27/17 9/27/17





 20:00 23:00 03:00 07:43


 


Temp  98.3 98.3 98.5





  98.3 98.3 98.5


 


Pulse  84 99 98


 


Resp  18 19 19


 


B/P (MAP)  165/124 (138) 169/116 (133) 112/68 (83)


 


Pulse Ox  100 99 99


 


O2 Delivery Room Air Room Air Room Air Room Air


 


    





    





 9/27/17   





 09:00   


 


Pulse 98   


 


B/P (MAP) 112/68   

















KENA LIVINGSTON MD Sep 27, 2017 09:39

## 2017-09-27 NOTE — PDOC4
PROCEDURE


Procedure


BRONCH WITH BAL REPORT DICTATED 


NO ENDO LESION


NO ACTIVE BLEEDING


WILL AWAIT BAL FOR NADIA WALKER MD Sep 27, 2017 13:17

## 2017-09-27 NOTE — PDOC
PULMONARY PROGRESS NOTES


Vitals





Vital Signs








  Date Time  Temp Pulse Resp B/P (MAP) Pulse Ox O2 Delivery O2 Flow Rate FiO2


 


9/27/17 09:00  98  112/68    


 


9/27/17 07:43 98.5  19  99 Room Air  





 98.5       








Labs





Laboratory Tests








Test


  9/25/17


19:50 9/25/17


21:00 9/26/17


08:15 9/26/17


10:30


 


White Blood Count


  2.3 x10^3/uL


(4.0-11.0) 


  3.9 x10^3/uL


(4.0-11.0) 


 


 


Red Blood Count


  4.31 x10^6/uL


(4.30-5.70) 


  4.35 x10^6/uL


(4.30-5.70) 


 


 


Hemoglobin


  14.2 g/dL


(13.0-17.5) 


  14.3 g/dL


(13.0-17.5) 


 


 


Hematocrit


  41.4 %


(39.0-53.0) 


  42.1 %


(39.0-53.0) 


 


 


Mean Corpuscular Volume 96 fL ()   97 fL ()  


 


Mean Corpuscular Hemoglobin 33 pg (25-35)   33 pg (25-35)  


 


Mean Corpuscular Hemoglobin


Concent 34 g/dL


(31-37) 


  34 g/dL


(31-37) 


 


 


Red Cell Distribution Width


  14.0 %


(11.5-14.5) 


  14.0 %


(11.5-14.5) 


 


 


Platelet Count


  76 x10^3/uL


(140-400) 


  39 x10^3/uL


(140-400) 


 


 


Neutrophils (%) (Auto) 42 % (31-73)   73 % (31-73)  


 


Lymphocytes (%) (Auto) 38 % (24-48)   13 % (24-48)  


 


Monocytes (%) (Auto) 19 % (0-9)   13 % (0-9)  


 


Eosinophils (%) (Auto) 1 % (0-3)   0 % (0-3)  


 


Basophils (%) (Auto) 1 % (0-3)   1 % (0-3)  


 


Neutrophils # (Auto)


  1.0 x10^3uL


(1.8-7.7) 


  2.8 x10^3uL


(1.8-7.7) 


 


 


Lymphocytes # (Auto)


  0.9 x10^3/uL


(1.0-4.8) 


  0.5 x10^3/uL


(1.0-4.8) 


 


 


Monocytes # (Auto)


  0.4 x10^3/uL


(0.0-1.1) 


  0.5 x10^3/uL


(0.0-1.1) 


 


 


Eosinophils # (Auto)


  0.0 x10^3/uL


(0.0-0.7) 


  0.0 x10^3/uL


(0.0-0.7) 


 


 


Basophils # (Auto)


  0.0 x10^3/uL


(0.0-0.2) 


  0.0 x10^3/uL


(0.0-0.2) 


 


 


Segmented Neutrophils % 49 % (35-66)    


 


Lymphocytes % 38 % (24-48)    


 


Atypical Lymphocytes %


(Manual) 1 % (0-0) 


  


  


  


 


 


Monocytes % 11 % (0-10)    


 


Eosinophils % 1 % (0-5)    


 


Platelet Estimate


  Decreased


(ADEQUATE) 


  


  


 


 


Prothrombin Time


  12.0 SEC


(11.7-14.0) 


  


  


 


 


Prothromb Time International


Ratio 0.9 (0.8-1.1) 


  


  


  


 


 


Sodium Level


  


  136 mmol/L


(136-145) 137 mmol/L


(136-145) 


 


 


Potassium Level


  


  4.4 mmol/L


(3.5-5.1) 4.0 mmol/L


(3.5-5.1) 


 


 


Chloride Level


  


  94 mmol/L


() 94 mmol/L


() 


 


 


Carbon Dioxide Level


  


  28 mmol/L


(21-32) 26 mmol/L


(21-32) 


 


 


Anion Gap  14 (6-14)  17 (6-14)  


 


Blood Urea Nitrogen  6 mg/dL (8-26)  8 mg/dL (8-26)  


 


Creatinine


  


  0.7 mg/dL


(0.7-1.3) 0.7 mg/dL


(0.7-1.3) 


 


 


Estimated GFR


(Cockcroft-Gault) 


  142.7 


  142.7 


  


 


 


BUN/Creatinine Ratio  9 (6-20)   


 


Glucose Level


  


  61 mg/dL


(70-99) 68 mg/dL


(70-99) 


 


 


Calcium Level


  


  8.8 mg/dL


(8.5-10.1) 8.9 mg/dL


(8.5-10.1) 


 


 


Total Bilirubin


  


  0.6 mg/dL


(0.2-1.0) 


  


 


 


Aspartate Amino Transf


(AST/SGOT) 


  456 U/L


(15-37) 


  


 


 


Alanine Aminotransferase


(ALT/SGPT) 


  241 U/L


(16-63) 


  


 


 


Alkaline Phosphatase


  


  117 U/L


() 


  


 


 


Total Protein


  


  8.6 g/dL


(6.4-8.2) 


  


 


 


Albumin


  


  3.9 g/dL


(3.4-5.0) 


  


 


 


Albumin/Globulin Ratio  0.8 (1.0-1.7)   


 


Lipase


  


  410 U/L


() 


  


 


 


Hepatitis A IgM Antibody


  


  


  


  Negative


(Negative)


 


Hepatitis B Surface Antigen


  


  


  


  Negative


(Negative)


 


Hepatitis B Core IgM Antibody


  


  


  


  Negative


(Negative)


 


Hepatitis C Antibody


  


  


  


  <0.1 s/co


ratio


 


HIV (1&2) Antibody


  


  


  


  Non reactive


(Non Reactive)


 


Test


  9/27/17


07:39 


  


  


 


 


White Blood Count


  4.6 x10^3/uL


(4.0-11.0) 


  


  


 


 


Red Blood Count


  4.59 x10^6/uL


(4.30-5.70) 


  


  


 


 


Hemoglobin


  15.2 g/dL


(13.0-17.5) 


  


  


 


 


Hematocrit


  44.5 %


(39.0-53.0) 


  


  


 


 


Mean Corpuscular Volume 97 fL ()    


 


Mean Corpuscular Hemoglobin 33 pg (25-35)    


 


Mean Corpuscular Hemoglobin


Concent 34 g/dL


(31-37) 


  


  


 


 


Red Cell Distribution Width


  13.9 %


(11.5-14.5) 


  


  


 


 


Platelet Count


  40 x10^3/uL


(140-400) 


  


  


 


 


Sodium Level


  134 mmol/L


(136-145) 


  


  


 


 


Potassium Level


  4.6 mmol/L


(3.5-5.1) 


  


  


 


 


Chloride Level


  90 mmol/L


() 


  


  


 


 


Carbon Dioxide Level


  29 mmol/L


(21-32) 


  


  


 


 


Anion Gap 15 (6-14)    


 


Blood Urea Nitrogen


  15 mg/dL


(8-26) 


  


  


 


 


Creatinine


  0.9 mg/dL


(0.7-1.3) 


  


  


 


 


Estimated GFR


(Cockcroft-Gault) 106.8 


  


  


  


 


 


BUN/Creatinine Ratio 17 (6-20)    


 


Glucose Level


  99 mg/dL


(70-99) 


  


  


 


 


Calcium Level


  10.2 mg/dL


(8.5-10.1) 


  


  


 


 


Total Bilirubin


  1.1 mg/dL


(0.2-1.0) 


  


  


 


 


Aspartate Amino Transf


(AST/SGOT) 310 U/L


(15-37) 


  


  


 


 


Alanine Aminotransferase


(ALT/SGPT) 224 U/L


(16-63) 


  


  


 


 


Alkaline Phosphatase


  119 U/L


() 


  


  


 


 


Total Protein


  9.4 g/dL


(6.4-8.2) 


  


  


 


 


Albumin


  4.2 g/dL


(3.4-5.0) 


  


  


 


 


Albumin/Globulin Ratio 0.8 (1.0-1.7)    








Laboratory Tests








Test


  9/26/17


10:30 9/27/17


07:39


 


Hepatitis A IgM Antibody


  Negative


(Negative) 


 


 


Hepatitis B Surface Antigen


  Negative


(Negative) 


 


 


Hepatitis B Core IgM Antibody


  Negative


(Negative) 


 


 


Hepatitis C Antibody


  <0.1 s/co


ratio 


 


 


HIV (1&2) Antibody


  Non reactive


(Non Reactive) 


 


 


White Blood Count


  


  4.6 x10^3/uL


(4.0-11.0)


 


Red Blood Count


  


  4.59 x10^6/uL


(4.30-5.70)


 


Hemoglobin


  


  15.2 g/dL


(13.0-17.5)


 


Hematocrit


  


  44.5 %


(39.0-53.0)


 


Mean Corpuscular Volume  97 fL () 


 


Mean Corpuscular Hemoglobin  33 pg (25-35) 


 


Mean Corpuscular Hemoglobin


Concent 


  34 g/dL


(31-37)


 


Red Cell Distribution Width


  


  13.9 %


(11.5-14.5)


 


Platelet Count


  


  40 x10^3/uL


(140-400)


 


Sodium Level


  


  134 mmol/L


(136-145)


 


Potassium Level


  


  4.6 mmol/L


(3.5-5.1)


 


Chloride Level


  


  90 mmol/L


()


 


Carbon Dioxide Level


  


  29 mmol/L


(21-32)


 


Anion Gap  15 (6-14) 


 


Blood Urea Nitrogen


  


  15 mg/dL


(8-26)


 


Creatinine


  


  0.9 mg/dL


(0.7-1.3)


 


Estimated GFR


(Cockcroft-Gault) 


  106.8 


 


 


BUN/Creatinine Ratio  17 (6-20) 


 


Glucose Level


  


  99 mg/dL


(70-99)


 


Calcium Level


  


  10.2 mg/dL


(8.5-10.1)


 


Total Bilirubin


  


  1.1 mg/dL


(0.2-1.0)


 


Aspartate Amino Transf


(AST/SGOT) 


  310 U/L


(15-37)


 


Alanine Aminotransferase


(ALT/SGPT) 


  224 U/L


(16-63)


 


Alkaline Phosphatase


  


  119 U/L


()


 


Total Protein


  


  9.4 g/dL


(6.4-8.2)


 


Albumin


  


  4.2 g/dL


(3.4-5.0)


 


Albumin/Globulin Ratio  0.8 (1.0-1.7) 








Medications





Active Scripts








 Medications  Dose


 Route/Sig


 Max Daily Dose Days Date Category


 


 No Active


 Prescriptions or


 Reported


 Medications  


 


     Rx











Impression


.


full consult dictated


will proceed with bronch in am


reviewed R/B/A


pt accepted











NADIA ANNE MD Sep 27, 2017 10:11

## 2017-09-27 NOTE — CONS
DATE OF CONSULTATION:  09/26/2017



ATTENDING PHYSICIAN:  Martin Holden M.D.



REASON FOR CONSULTATION:  The patient seen in pulmonary consultation at the

request of Dr. Holden for abnormal CT of the chest and hemoptysis.



HISTORY OF PRESENT ILLNESS:  The patient is a 51-year-old male with a history of

tuberculosis.  He was incarcerated.  He was treated for 9 months to a year. 

Over the last several days, he has had a cough productive of blood.  He states

he brought up 1 tablespoon, currently coughing up some mucus mixed in with

blood.  He has also had some night sweats.  His appetite has been adequate.  No

significant change in weight.  He has always been thin.



He continues to smoke.  He had a CT of the chest, which I personally reviewed. 

In comparison it to the film of 2013, the patient's right upper lobe process is

worse and he has mass-like consolidation of the right apex with some volume loss

and some emphysematous changes.



The patient has been seen by the Infectious Disease Service and has been placed

on antibiotics.  HIV status is currently pending.  He is currently on no IV

antibiotics.



ALLERGIES:  No known drug allergies.



SOCIAL HISTORY:  He continues to smoke, also smokes marijuana.  His LFTs are

elevated.  I did not inquire about excessive alcohol intake.  He was due to

followup at [facility] regarding elevation of his liver function testing.



REVIEW OF SYSTEMS:  As indicated above, otherwise, a 10-point system was

reviewed and negative.



PHYSICAL EXAMINATION:

GENERAL:  The patient appeared to be older than stated age.  Since admission, he

has been afebrile.  While I was examining him, he was diaphoretic.

HEENT:  Neck:  Jugular venous distention was not elevated.  No lymphadenopathy.

LUNGS:  With diminished breath sounds in the right base, otherwise no wheezes.

CARDIOVASCULAR:  Regular rate and rhythm with S1, S2, no S3.

ABDOMEN:  Soft.  Liver appeared to be slightly enlarged.

EXTREMITIES:  No clubbing, cyanosis or pitting edema.

NEUROLOGIC:  The patient was awake, alert, following commands.  A detailed neuro

exam was not performed.



LABORATORY DATA:  Reviewed.  AST and ALT were markedly elevated, lipase level

was elevated.  Electrolytes were noted.  White count was low.  Hemoglobin and

hematocrit were noted.  INR is 0.9.



IMPRESSION:

1.  Hemoptysis, suspect secondary to reactivation of tuberculosis, possibly

malignancy.

2.  Abnormal CT of the chest as indicated above.  Differential diagnosis

includes reactivation of tuberculosis versus malignancy.  Doubt Aspergillus.

3.  Elevated LFTs.

4.  Possible HIV.

5.  Tobacco dependence.

6.  _____.

7.  Leukopenia.



PLAN:

1.  We will proceed with diagnostic bronchoscopy.  I reviewed the risks,

benefits and alternatives.  The patient has consented.

2.  Follow up input from Infectious Disease service.

3.  HIV status pending.

4.  Ultrasound of the abdomen is pending.



I do appreciate the privilege in sharing in the patient's care.

 



______________________________

NADIA ANNE MD



DR:  HANNA/adrianna  JOB#:  [id number] / [id number]

DD:  09/26/2017 15:06  DT:  09/27/2017 01:24

## 2017-09-27 NOTE — CONS
DATE OF CONSULTATION:  09/26/2017



REQUESTING PHYSICIAN:  Dr. Troy Holden.



REASON FOR CONSULTATION:  Thrombocytopenia.



HISTORY OF PRESENT ILLNESS:  The patient is a 51-year-old 

gentleman who presented to [facility] on 09/25/2017 with

complaints of hemoptysis that started on the morning of 09/25/2017.  He reports

that he was coughing up clumps of bright red blood and dark blood.  He has a

history of tuberculosis in the past and he was treated for it.  He also has had

shortness of breath, but no chest pain.  He denies nausea, vomiting or diarrhea.

 No hematemesis, melena or hematochezia.  No hematuria.



He underwent a chest x-ray on 09/25/2017 that revealed a region of opacification

in the right upper lobe of the lung.  He underwent CT angiogram on 09/25/2017,

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 

There is mass-like consolidation in the right apex with areas of central air

cavitation, which has progressed since the prior exam from 07/2013.  Findings

may be seen in aspergillosis or secondary tuberculosis versus malignancy. 

Diffuse hepatic steatosis was also noted.  Infectious Disease consultation and

Pulmonary consultation was obtained.



The patient is scheduled to have a bronchoscopy on 09/27/2017.



He was noted to have thrombocytopenia with a platelet count of 76,000 on

09/25/2017 and the platelets further worsened to 39,000 on 09/26/2017 and hence

I was consulted.



PAST MEDICAL HISTORY:  Hypertension and tuberculosis.



SOCIAL HISTORY:  He has history of smoking 10 packs of cigarettes per day.



FAMILY HISTORY:  No history of primary hematologic disorders or cancer.



REVIEW OF SYSTEMS:  A 12-point review of systems was performed.  Pertinent

positives are mentioned in the history of present illness.  Rest of the system

review is negative.



PHYSICAL EXAMINATION:

GENERAL APPEARANCE:  The patient is a 51-year-old  gentleman who

is in no acute cardiorespiratory distress.

VITAL SIGNS:  Blood pressure 162/110, temperature 98.0.

HEAD:  Atraumatic, normocephalic.

EYES:  No icterus.

NECK:  Supple.

CHEST:  Bilaterally symmetrical.

CARDIOVASCULAR:  No jugular venous distention.

ABDOMEN:  Nontender.

CENTRAL NERVOUS SYSTEM:  No focal deficits.

LYMPHATICS:  No lymphadenopathy.

SKIN:  No rashes.

PSYCHOLOGIC:  Mood and affect are appropriate.

MUSCULOSKELETAL:  No joint effusions.



LABORATORY DATA:  On 09/26/2017, WBC 3.9, hemoglobin 14.3, platelet count 39.



IMPRESSION AND PLAN:

1.  Thrombocytopenia with a platelet count of 39,000 on 09/26/2017.  It was

76,000 on 09/25/2017.  Peripheral smear reveals evidence of decreased platelets,

but there are a few giant platelets.  This indicates that he has a reactive

thrombocytopenia and I do not suspect a primary bone marrow disorder.  Pulmonary

and Infectious Disease have been consulted for possible tuberculosis.  I suspect

he has a reactive thrombocytopenia due to underlying infection.  I will continue

to monitor.  If he develops any significant bleeding, then platelet transfusion

would be necessary.  Otherwise, monitoring of CBC would be reasonable.

2.  Leukopenia, reactive due to possible underlying infectious process.  Workup

is in progress.

3.  Lung lesions/masses.  I appreciate Pulmonary consultation.  Bronchoscopy has

been planned for 09/27/2017.

 



______________________________

KENA LIVINGSTON MD



DR:  NADYA/nts  JOB#:  [id number] / [id number]

DD:  09/26/2017 17:52  DT:  09/27/2017 15:51



TROY Chou MD

MTDD

## 2017-09-27 NOTE — PDOC
SUBJECTIVE


Subjective


feels ok, no new problems, c/o bored and anxious to know Dx and start Tx





OBJECTIVE


Vital Signs





Vital Signs








  Date Time  Temp Pulse Resp B/P (MAP) Pulse Ox O2 Delivery O2 Flow Rate FiO2


 


9/27/17 07:43 98.5 98 19 112/68 (83) 99 Room Air  





 98.5       


 


9/27/17 03:00 98.3 99 19 169/116 (133) 99 Room Air  





 98.3       


 


9/26/17 23:00 98.3 84 18 165/124 (138) 100 Room Air  





 98.3       


 


9/26/17 20:00      Room Air  


 


9/26/17 19:00 97.8 90 17 165/124 (138) 98 Room Air  





 97.8       


 


9/26/17 15:47 98.0 62 20 162/110 (127) 97 Room Air  





 98.0       


 


9/26/17 15:25  75  146/114    


 


9/26/17 11:38 97.8 75 18 146/114 (125) 97 Room Air  





 97.8       











PHYSICAL EXAM


Physical Exam


lungs decrease BS


heart RRR


abd less epigastric tenderness


ext no edema





ASSESSMENT/PLAN


Assessment/Plan


1- lung mass with cavitation


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


3- hepatitis seems alcoholic , HIV neg


4-weight loss


5- thrombocytopenia reactive discussed with Ophelia Piedra transfuse platlets only if 

clinical evidence of bleed


6-Gastritis on PPI and antiemetics 





agree with bronchoscopy today discussed with pt , he is anxious for answers.


Problems:  





COMMENT


Lab





Laboratory Tests








Test


  9/26/17


10:30 9/27/17


07:39


 


Hepatitis A IgM Antibody


  Negative


(Negative) 


 


 


Hepatitis B Surface Antigen


  Negative


(Negative) 


 


 


Hepatitis B Core IgM Antibody


  Negative


(Negative) 


 


 


Hepatitis C Antibody


  <0.1 s/co


ratio 


 


 


HIV (1&2) Antibody


  Non reactive


(Non Reactive) 


 


 


White Blood Count


  


  4.6 x10^3/uL


(4.0-11.0)


 


Red Blood Count


  


  4.59 x10^6/uL


(4.30-5.70)


 


Hemoglobin


  


  15.2 g/dL


(13.0-17.5)


 


Hematocrit


  


  44.5 %


(39.0-53.0)


 


Mean Corpuscular Volume  97 fL () 


 


Mean Corpuscular Hemoglobin  33 pg (25-35) 


 


Mean Corpuscular Hemoglobin


Concent 


  34 g/dL


(31-37)


 


Red Cell Distribution Width


  


  13.9 %


(11.5-14.5)


 


Platelet Count


  


  40 x10^3/uL


(140-400)


 


Sodium Level


  


  134 mmol/L


(136-145)


 


Potassium Level


  


  4.6 mmol/L


(3.5-5.1)


 


Chloride Level


  


  90 mmol/L


()


 


Carbon Dioxide Level


  


  29 mmol/L


(21-32)


 


Anion Gap  15 (6-14) 


 


Blood Urea Nitrogen


  


  15 mg/dL


(8-26)


 


Creatinine


  


  0.9 mg/dL


(0.7-1.3)


 


Estimated GFR


(Cockcroft-Gault) 


  106.8 


 


 


BUN/Creatinine Ratio  17 (6-20) 


 


Glucose Level


  


  99 mg/dL


(70-99)


 


Calcium Level


  


  10.2 mg/dL


(8.5-10.1)


 


Total Bilirubin


  


  1.1 mg/dL


(0.2-1.0)


 


Aspartate Amino Transf


(AST/SGOT) 


  310 U/L


(15-37)


 


Alanine Aminotransferase


(ALT/SGPT) 


  224 U/L


(16-63)


 


Alkaline Phosphatase


  


  119 U/L


()


 


Total Protein


  


  9.4 g/dL


(6.4-8.2)


 


Albumin


  


  4.2 g/dL


(3.4-5.0)


 


Albumin/Globulin Ratio  0.8 (1.0-1.7) 

















TROY HAMMER MD Sep 27, 2017 08:31

## 2017-09-27 NOTE — PDOC
PULMONARY PROGRESS NOTES


Subjective


PT WITH NO INCREASE HEMOPTYSIS


Vitals





Vital Signs








  Date Time  Temp Pulse Resp B/P (MAP) Pulse Ox O2 Delivery O2 Flow Rate FiO2


 


9/27/17 13:03 97.0 116 18 160/89 94 Room Air  





 97.0       


 


9/27/17 12:47       4 








ROS:  No Nausea, No Chest Pain, No Abdominal Pain, No Increase Cough


Lungs:  Crackles


Cardiovascular:  S1, S2


Abdomen:  Soft


Neuro Exam:  Alert


Extremities:  No Edema


Skin:  Warm


Labs





Laboratory Tests








Test


  9/25/17


19:50 9/25/17


21:00 9/26/17


08:15 9/26/17


10:30


 


White Blood Count


  2.3 x10^3/uL


(4.0-11.0) 


  3.9 x10^3/uL


(4.0-11.0) 


 


 


Red Blood Count


  4.31 x10^6/uL


(4.30-5.70) 


  4.35 x10^6/uL


(4.30-5.70) 


 


 


Hemoglobin


  14.2 g/dL


(13.0-17.5) 


  14.3 g/dL


(13.0-17.5) 


 


 


Hematocrit


  41.4 %


(39.0-53.0) 


  42.1 %


(39.0-53.0) 


 


 


Mean Corpuscular Volume 96 fL ()   97 fL ()  


 


Mean Corpuscular Hemoglobin 33 pg (25-35)   33 pg (25-35)  


 


Mean Corpuscular Hemoglobin


Concent 34 g/dL


(31-37) 


  34 g/dL


(31-37) 


 


 


Red Cell Distribution Width


  14.0 %


(11.5-14.5) 


  14.0 %


(11.5-14.5) 


 


 


Platelet Count


  76 x10^3/uL


(140-400) 


  39 x10^3/uL


(140-400) 


 


 


Neutrophils (%) (Auto) 42 % (31-73)   73 % (31-73)  


 


Lymphocytes (%) (Auto) 38 % (24-48)   13 % (24-48)  


 


Monocytes (%) (Auto) 19 % (0-9)   13 % (0-9)  


 


Eosinophils (%) (Auto) 1 % (0-3)   0 % (0-3)  


 


Basophils (%) (Auto) 1 % (0-3)   1 % (0-3)  


 


Neutrophils # (Auto)


  1.0 x10^3uL


(1.8-7.7) 


  2.8 x10^3uL


(1.8-7.7) 


 


 


Lymphocytes # (Auto)


  0.9 x10^3/uL


(1.0-4.8) 


  0.5 x10^3/uL


(1.0-4.8) 


 


 


Monocytes # (Auto)


  0.4 x10^3/uL


(0.0-1.1) 


  0.5 x10^3/uL


(0.0-1.1) 


 


 


Eosinophils # (Auto)


  0.0 x10^3/uL


(0.0-0.7) 


  0.0 x10^3/uL


(0.0-0.7) 


 


 


Basophils # (Auto)


  0.0 x10^3/uL


(0.0-0.2) 


  0.0 x10^3/uL


(0.0-0.2) 


 


 


Segmented Neutrophils % 49 % (35-66)    


 


Lymphocytes % 38 % (24-48)    


 


Atypical Lymphocytes %


(Manual) 1 % (0-0) 


  


  


  


 


 


Monocytes % 11 % (0-10)    


 


Eosinophils % 1 % (0-5)    


 


Platelet Estimate


  Decreased


(ADEQUATE) 


  


  


 


 


Prothrombin Time


  12.0 SEC


(11.7-14.0) 


  


  


 


 


Prothromb Time International


Ratio 0.9 (0.8-1.1) 


  


  


  


 


 


Sodium Level


  


  136 mmol/L


(136-145) 137 mmol/L


(136-145) 


 


 


Potassium Level


  


  4.4 mmol/L


(3.5-5.1) 4.0 mmol/L


(3.5-5.1) 


 


 


Chloride Level


  


  94 mmol/L


() 94 mmol/L


() 


 


 


Carbon Dioxide Level


  


  28 mmol/L


(21-32) 26 mmol/L


(21-32) 


 


 


Anion Gap  14 (6-14)  17 (6-14)  


 


Blood Urea Nitrogen  6 mg/dL (8-26)  8 mg/dL (8-26)  


 


Creatinine


  


  0.7 mg/dL


(0.7-1.3) 0.7 mg/dL


(0.7-1.3) 


 


 


Estimated GFR


(Cockcroft-Gault) 


  142.7 


  142.7 


  


 


 


BUN/Creatinine Ratio  9 (6-20)   


 


Glucose Level


  


  61 mg/dL


(70-99) 68 mg/dL


(70-99) 


 


 


Calcium Level


  


  8.8 mg/dL


(8.5-10.1) 8.9 mg/dL


(8.5-10.1) 


 


 


Total Bilirubin


  


  0.6 mg/dL


(0.2-1.0) 


  


 


 


Aspartate Amino Transf


(AST/SGOT) 


  456 U/L


(15-37) 


  


 


 


Alanine Aminotransferase


(ALT/SGPT) 


  241 U/L


(16-63) 


  


 


 


Alkaline Phosphatase


  


  117 U/L


() 


  


 


 


Total Protein


  


  8.6 g/dL


(6.4-8.2) 


  


 


 


Albumin


  


  3.9 g/dL


(3.4-5.0) 


  


 


 


Albumin/Globulin Ratio  0.8 (1.0-1.7)   


 


Lipase


  


  410 U/L


() 


  


 


 


Hepatitis A IgM Antibody


  


  


  


  Negative


(Negative)


 


Hepatitis B Surface Antigen


  


  


  


  Negative


(Negative)


 


Hepatitis B Core IgM Antibody


  


  


  


  Negative


(Negative)


 


Hepatitis C Antibody


  


  


  


  <0.1 s/co


ratio


 


HIV (1&2) Antibody


  


  


  


  Non reactive


(Non Reactive)


 


Test


  9/27/17


07:39 


  


  


 


 


White Blood Count


  4.6 x10^3/uL


(4.0-11.0) 


  


  


 


 


Red Blood Count


  4.59 x10^6/uL


(4.30-5.70) 


  


  


 


 


Hemoglobin


  15.2 g/dL


(13.0-17.5) 


  


  


 


 


Hematocrit


  44.5 %


(39.0-53.0) 


  


  


 


 


Mean Corpuscular Volume 97 fL ()    


 


Mean Corpuscular Hemoglobin 33 pg (25-35)    


 


Mean Corpuscular Hemoglobin


Concent 34 g/dL


(31-37) 


  


  


 


 


Red Cell Distribution Width


  13.9 %


(11.5-14.5) 


  


  


 


 


Platelet Count


  40 x10^3/uL


(140-400) 


  


  


 


 


Sodium Level


  134 mmol/L


(136-145) 


  


  


 


 


Potassium Level


  4.6 mmol/L


(3.5-5.1) 


  


  


 


 


Chloride Level


  90 mmol/L


() 


  


  


 


 


Carbon Dioxide Level


  29 mmol/L


(21-32) 


  


  


 


 


Anion Gap 15 (6-14)    


 


Blood Urea Nitrogen


  15 mg/dL


(8-26) 


  


  


 


 


Creatinine


  0.9 mg/dL


(0.7-1.3) 


  


  


 


 


Estimated GFR


(Cockcroft-Gault) 106.8 


  


  


  


 


 


BUN/Creatinine Ratio 17 (6-20)    


 


Glucose Level


  99 mg/dL


(70-99) 


  


  


 


 


Calcium Level


  10.2 mg/dL


(8.5-10.1) 


  


  


 


 


Total Bilirubin


  1.1 mg/dL


(0.2-1.0) 


  


  


 


 


Aspartate Amino Transf


(AST/SGOT) 310 U/L


(15-37) 


  


  


 


 


Alanine Aminotransferase


(ALT/SGPT) 224 U/L


(16-63) 


  


  


 


 


Alkaline Phosphatase


  119 U/L


() 


  


  


 


 


Total Protein


  9.4 g/dL


(6.4-8.2) 


  


  


 


 


Albumin


  4.2 g/dL


(3.4-5.0) 


  


  


 


 


Albumin/Globulin Ratio 0.8 (1.0-1.7)    








Laboratory Tests








Test


  9/27/17


07:39


 


White Blood Count


  4.6 x10^3/uL


(4.0-11.0)


 


Red Blood Count


  4.59 x10^6/uL


(4.30-5.70)


 


Hemoglobin


  15.2 g/dL


(13.0-17.5)


 


Hematocrit


  44.5 %


(39.0-53.0)


 


Mean Corpuscular Volume 97 fL () 


 


Mean Corpuscular Hemoglobin 33 pg (25-35) 


 


Mean Corpuscular Hemoglobin


Concent 34 g/dL


(31-37)


 


Red Cell Distribution Width


  13.9 %


(11.5-14.5)


 


Platelet Count


  40 x10^3/uL


(140-400)


 


Sodium Level


  134 mmol/L


(136-145)


 


Potassium Level


  4.6 mmol/L


(3.5-5.1)


 


Chloride Level


  90 mmol/L


()


 


Carbon Dioxide Level


  29 mmol/L


(21-32)


 


Anion Gap 15 (6-14) 


 


Blood Urea Nitrogen


  15 mg/dL


(8-26)


 


Creatinine


  0.9 mg/dL


(0.7-1.3)


 


Estimated GFR


(Cockcroft-Gault) 106.8 


 


 


BUN/Creatinine Ratio 17 (6-20) 


 


Glucose Level


  99 mg/dL


(70-99)


 


Calcium Level


  10.2 mg/dL


(8.5-10.1)


 


Total Bilirubin


  1.1 mg/dL


(0.2-1.0)


 


Aspartate Amino Transf


(AST/SGOT) 310 U/L


(15-37)


 


Alanine Aminotransferase


(ALT/SGPT) 224 U/L


(16-63)


 


Alkaline Phosphatase


  119 U/L


()


 


Total Protein


  9.4 g/dL


(6.4-8.2)


 


Albumin


  4.2 g/dL


(3.4-5.0)


 


Albumin/Globulin Ratio 0.8 (1.0-1.7) 








Medications





Active Scripts








 Medications  Dose


 Route/Sig


 Max Daily Dose Days Date Category


 


 No Active


 Prescriptions or


 Reported


 Medications  


 


     Rx











Impression


.


1.  Hemoptysis, suspect secondary to reactivation of tuberculosis, possibly


malignancy.


2.  Abnormal CT of the chest as indicated above.  Differential diagnosis


includes reactivation of tuberculosis versus malignancy.  Doubt Aspergillus.


3.  Elevated LFTs.


4.  Possible HIV.


5.  Tobacco dependence.


6.  ETOH ABUSE





Plan


.


[facility]ON[facility] TODAY REVIEWED R/B/A PT ACCEPTED


HIV PENDING


CONTINUE ANITBX


FOLLOW GI INPUT


MONITOR FOR ETOH WITHDRAWAL











NADIA ANNE MD Sep 27, 2017 13:14

## 2017-09-27 NOTE — OP
DATE OF SURGERY:  09/27/2017



ATTENDING PHYSICIAN:  Martin Holden MD.



PROCEDURE:  Bronchoscopy, bronchoalveolar lavage.



INDICATIONS:  The patient with abnormal CT of the chest and hemoptysis with a

history of tuberculosis, undergoing diagnostic bronchoscopy.  Risks, benefits,

and alternatives reviewed with the patient, he consented.



DESCRIPTION OF PROCEDURE:  A timeout was performed prior to sedation.  Vital

signs and O2 saturation were maintained within normal limits throughout the

procedure.  The patient was sedated under general with anesthesiologist in

presence.



The bronchoscope was passed through the right naris.  Vocal cords were

identified moving bilaterally without any dysfunction.  The vocal cords were

then anesthetized with a total of 5 mL of 4% lidocaine.  The bronchoscope was

passed through the vocal cords into the proximal trachea, which was normal.  The

distal trachea was likewise normal.  The right and left segments and subsegments

were inspected.  There was no endobronchial lesion.  There was no evidence of

active bleeding.  There was no mucus plugging.



The scope was wedged into the right upper lobe segment and a bronchoalveolar

lavage was performed.  Upon the later part of the return, the fluid was

serosanguineous.



FINDINGS:

1.  Normal vocal cords.

2.  No endobronchial lesion.

3.  No active bleeding.



PLAN:  We will await the BAL results from the right upper lobe.  The patient

tolerated the procedure without any immediate complications.

 



______________________________

NADIA ANNE MD



DR:  HANNA/adrianna  JOB#:  [id number] / [id number]

DD:  09/27/2017 13:16  DT:  09/27/2017 17:36

## 2017-09-27 NOTE — PDOC
Infectious Disease Note


Subjective


Subjective


feeling better





ROS


ROS


GEN: Denies fevers, chills, sweats


HEENT: Denies blurred vision, sore throat


CV: Denies chest pain


RESP: Denies shortness of air, cough


GI: Denies n/v/d


NEURO: Denies confusion, dizziness


MSK: Denies weakness, joint pain/swelling





Vital Sign


Vital Signs





Vital Signs








  Date Time  Temp Pulse Resp B/P (MAP) Pulse Ox O2 Delivery O2 Flow Rate FiO2


 


9/27/17 09:00  98  112/68    


 


9/27/17 08:00      Room Air  


 


9/27/17 07:43 98.5  19  99   





 98.5       











Physical Exam


PHYSICAL EXAM


GENERAL:  NAD, Alert


HEENT:  PERRL, OC/OP


NECK:  Supple, no JVD, no LN


LUNGS:  Clear


HEART:  S1S2, no gallop, no murmur


ABD:  Soft, NT, no organomegaly, no rebound


EXT:  No edema, no cyanosis


CNS:  Alert, oriented x 3, no focal neurologic deficit


SKIN:  No rash


IV: ok





Labs


Lab





Laboratory Tests








Test


  9/27/17


07:39


 


White Blood Count


  4.6 x10^3/uL


(4.0-11.0)


 


Red Blood Count


  4.59 x10^6/uL


(4.30-5.70)


 


Hemoglobin


  15.2 g/dL


(13.0-17.5)


 


Hematocrit


  44.5 %


(39.0-53.0)


 


Mean Corpuscular Volume 97 fL () 


 


Mean Corpuscular Hemoglobin 33 pg (25-35) 


 


Mean Corpuscular Hemoglobin


Concent 34 g/dL


(31-37)


 


Red Cell Distribution Width


  13.9 %


(11.5-14.5)


 


Platelet Count


  40 x10^3/uL


(140-400)


 


Sodium Level


  134 mmol/L


(136-145)


 


Potassium Level


  4.6 mmol/L


(3.5-5.1)


 


Chloride Level


  90 mmol/L


()


 


Carbon Dioxide Level


  29 mmol/L


(21-32)


 


Anion Gap 15 (6-14) 


 


Blood Urea Nitrogen


  15 mg/dL


(8-26)


 


Creatinine


  0.9 mg/dL


(0.7-1.3)


 


Estimated GFR


(Cockcroft-Gault) 106.8 


 


 


BUN/Creatinine Ratio 17 (6-20) 


 


Glucose Level


  99 mg/dL


(70-99)


 


Calcium Level


  10.2 mg/dL


(8.5-10.1)


 


Total Bilirubin


  1.1 mg/dL


(0.2-1.0)


 


Aspartate Amino Transf


(AST/SGOT) 310 U/L


(15-37)


 


Alanine Aminotransferase


(ALT/SGPT) 224 U/L


(16-63)


 


Alkaline Phosphatase


  119 U/L


()


 


Total Protein


  9.4 g/dL


(6.4-8.2)


 


Albumin


  4.2 g/dL


(3.4-5.0)


 


Albumin/Globulin Ratio 0.8 (1.0-1.7) 











Objective


Assessment





Hemoptysis


Cavitary lung infiltrate,, 


Chachexia


Leukopenia


Elevated LFT


h/o TB treated about 9 years ago





Plan


Plan of Care


 HIV neg


need bronch and biopsy and culture , today


likely also had viral illness, wbc improving, lft improving











HORTENCIA CLAYTON MD Sep 27, 2017 10:35

## 2017-09-28 VITALS — DIASTOLIC BLOOD PRESSURE: 93 MMHG | SYSTOLIC BLOOD PRESSURE: 131 MMHG

## 2017-09-28 VITALS — DIASTOLIC BLOOD PRESSURE: 88 MMHG | SYSTOLIC BLOOD PRESSURE: 124 MMHG

## 2017-09-28 VITALS — SYSTOLIC BLOOD PRESSURE: 116 MMHG | DIASTOLIC BLOOD PRESSURE: 89 MMHG

## 2017-09-28 VITALS — DIASTOLIC BLOOD PRESSURE: 98 MMHG | SYSTOLIC BLOOD PRESSURE: 137 MMHG

## 2017-09-28 VITALS — DIASTOLIC BLOOD PRESSURE: 90 MMHG | SYSTOLIC BLOOD PRESSURE: 129 MMHG

## 2017-09-28 VITALS — DIASTOLIC BLOOD PRESSURE: 106 MMHG | SYSTOLIC BLOOD PRESSURE: 137 MMHG

## 2017-09-28 LAB
ALBUMIN SERPL-MCNC: 3.5 G/DL (ref 3.4–5)
ALBUMIN/GLOB SERPL: 0.8 {RATIO} (ref 1–1.7)
ALP SERPL-CCNC: 100 U/L (ref 46–116)
ALT SERPL-CCNC: 150 U/L (ref 16–63)
AMYLASE SERPL-CCNC: 586 U/L (ref 25–115)
ANION GAP SERPL CALC-SCNC: 10 MMOL/L (ref 6–14)
AST SERPL-CCNC: 166 U/L (ref 15–37)
BILIRUB SERPL-MCNC: 1.1 MG/DL (ref 0.2–1)
BUN SERPL-MCNC: 12 MG/DL (ref 8–26)
BUN/CREAT SERPL: 17 (ref 6–20)
CALCIUM SERPL-MCNC: 9.4 MG/DL (ref 8.5–10.1)
CHLORIDE SERPL-SCNC: 96 MMOL/L (ref 98–107)
CO2 SERPL-SCNC: 29 MMOL/L (ref 21–32)
CREAT SERPL-MCNC: 0.7 MG/DL (ref 0.7–1.3)
ERYTHROCYTE [DISTWIDTH] IN BLOOD BY AUTOMATED COUNT: 13.7 % (ref 11.5–14.5)
GFR SERPLBLD BASED ON 1.73 SQ M-ARVRAT: 142.7 ML/MIN
GLOBULIN SER-MCNC: 4.4 G/DL (ref 2.2–3.8)
GLUCOSE SERPL-MCNC: 92 MG/DL (ref 70–99)
HCT VFR BLD CALC: 39.9 % (ref 39–53)
HGB BLD-MCNC: 13.9 G/DL (ref 13–17.5)
MCH RBC QN AUTO: 33 PG (ref 25–35)
MCHC RBC AUTO-ENTMCNC: 35 G/DL (ref 31–37)
MCV RBC AUTO: 95 FL (ref 79–100)
PLATELET # BLD AUTO: 37 X10^3/UL (ref 140–400)
POTASSIUM SERPL-SCNC: 4 MMOL/L (ref 3.5–5.1)
PROT SERPL-MCNC: 7.9 G/DL (ref 6.4–8.2)
RBC # BLD AUTO: 4.19 X10^6/UL (ref 4.3–5.7)
SODIUM SERPL-SCNC: 135 MMOL/L (ref 136–145)
WBC # BLD AUTO: 5.7 X10^3/UL (ref 4–11)

## 2017-09-28 RX ADMIN — MORPHINE SULFATE PRN MG: 4 INJECTION, SOLUTION INTRAMUSCULAR; INTRAVENOUS at 20:32

## 2017-09-28 RX ADMIN — PANTOPRAZOLE SODIUM SCH MG: 40 TABLET, DELAYED RELEASE ORAL at 10:22

## 2017-09-28 RX ADMIN — NICOTINE SCH PATCH: 21 PATCH, EXTENDED RELEASE TOPICAL at 10:25

## 2017-09-28 RX ADMIN — AMOXICILLIN AND CLAVULANATE POTASSIUM SCH TAB: 875; 125 TABLET, FILM COATED ORAL at 12:30

## 2017-09-28 RX ADMIN — FOLIC ACID SCH MLS/HR: 5 INJECTION, SOLUTION INTRAMUSCULAR; INTRAVENOUS; SUBCUTANEOUS at 10:26

## 2017-09-28 RX ADMIN — AMOXICILLIN AND CLAVULANATE POTASSIUM SCH TAB: 875; 125 TABLET, FILM COATED ORAL at 20:19

## 2017-09-28 NOTE — PDOC
PULMONARY PROGRESS NOTES


Subjective


no new symptoms


Vitals





Vital Signs








  Date Time  Temp Pulse Resp B/P (MAP) Pulse Ox O2 Delivery O2 Flow Rate FiO2


 


9/28/17 07:00 97.7 90 18 116/89 (98) 96 Room Air  





 97.7       


 


9/27/17 12:47       4 








ROS:  No Nausea, No Chest Pain, No Abdominal Pain, No Increase Cough


Lungs:  Clear


Cardiovascular:  S1


Abdomen:  Soft


Neuro Exam:  Alert


Extremities:  No Edema


Skin:  Warm


Labs





Laboratory Tests








Test


  9/26/17


10:30 9/27/17


07:39 9/28/17


06:00


 


Hepatitis A IgM Antibody


  Negative


(Negative) 


  


 


 


Hepatitis B Surface Antigen


  Negative


(Negative) 


  


 


 


Hepatitis B Core IgM Antibody


  Negative


(Negative) 


  


 


 


Hepatitis C Antibody


  <0.1 s/co


ratio 


  


 


 


HIV (1&2) Antibody


  Non reactive


(Non Reactive) 


  


 


 


White Blood Count


  


  4.6 x10^3/uL


(4.0-11.0) 5.7 x10^3/uL


(4.0-11.0)


 


Red Blood Count


  


  4.59 x10^6/uL


(4.30-5.70) 4.19 x10^6/uL


(4.30-5.70)


 


Hemoglobin


  


  15.2 g/dL


(13.0-17.5) 13.9 g/dL


(13.0-17.5)


 


Hematocrit


  


  44.5 %


(39.0-53.0) 39.9 %


(39.0-53.0)


 


Mean Corpuscular Volume  97 fL ()  95 fL () 


 


Mean Corpuscular Hemoglobin  33 pg (25-35)  33 pg (25-35) 


 


Mean Corpuscular Hemoglobin


Concent 


  34 g/dL


(31-37) 35 g/dL


(31-37)


 


Red Cell Distribution Width


  


  13.9 %


(11.5-14.5) 13.7 %


(11.5-14.5)


 


Platelet Count


  


  40 x10^3/uL


(140-400) 37 x10^3/uL


(140-400)


 


Sodium Level


  


  134 mmol/L


(136-145) 135 mmol/L


(136-145)


 


Potassium Level


  


  4.6 mmol/L


(3.5-5.1) 4.0 mmol/L


(3.5-5.1)


 


Chloride Level


  


  90 mmol/L


() 96 mmol/L


()


 


Carbon Dioxide Level


  


  29 mmol/L


(21-32) 29 mmol/L


(21-32)


 


Anion Gap  15 (6-14)  10 (6-14) 


 


Blood Urea Nitrogen


  


  15 mg/dL


(8-26) 12 mg/dL


(8-26)


 


Creatinine


  


  0.9 mg/dL


(0.7-1.3) 0.7 mg/dL


(0.7-1.3)


 


Estimated GFR


(Cockcroft-Gault) 


  106.8 


  142.7 


 


 


BUN/Creatinine Ratio  17 (6-20)  17 (6-20) 


 


Glucose Level


  


  99 mg/dL


(70-99) 92 mg/dL


(70-99)


 


Calcium Level


  


  10.2 mg/dL


(8.5-10.1) 9.4 mg/dL


(8.5-10.1)


 


Total Bilirubin


  


  1.1 mg/dL


(0.2-1.0) 1.1 mg/dL


(0.2-1.0)


 


Aspartate Amino Transf


(AST/SGOT) 


  310 U/L


(15-37) 166 U/L


(15-37)


 


Alanine Aminotransferase


(ALT/SGPT) 


  224 U/L


(16-63) 150 U/L


(16-63)


 


Alkaline Phosphatase


  


  119 U/L


() 100 U/L


()


 


Total Protein


  


  9.4 g/dL


(6.4-8.2) 7.9 g/dL


(6.4-8.2)


 


Albumin


  


  4.2 g/dL


(3.4-5.0) 3.5 g/dL


(3.4-5.0)


 


Albumin/Globulin Ratio  0.8 (1.0-1.7)  0.8 (1.0-1.7) 


 


Erythrocyte Sedimentation Rate   25 (0-15) 


 


Amylase Level


  


  


  586 U/L


()


 


Lipase


  


  


  2553 U/L


()








Laboratory Tests








Test


  9/28/17


06:00


 


White Blood Count


  5.7 x10^3/uL


(4.0-11.0)


 


Red Blood Count


  4.19 x10^6/uL


(4.30-5.70)


 


Hemoglobin


  13.9 g/dL


(13.0-17.5)


 


Hematocrit


  39.9 %


(39.0-53.0)


 


Mean Corpuscular Volume 95 fL () 


 


Mean Corpuscular Hemoglobin 33 pg (25-35) 


 


Mean Corpuscular Hemoglobin


Concent 35 g/dL


(31-37)


 


Red Cell Distribution Width


  13.7 %


(11.5-14.5)


 


Platelet Count


  37 x10^3/uL


(140-400)


 


Erythrocyte Sedimentation Rate 25 (0-15) 


 


Sodium Level


  135 mmol/L


(136-145)


 


Potassium Level


  4.0 mmol/L


(3.5-5.1)


 


Chloride Level


  96 mmol/L


()


 


Carbon Dioxide Level


  29 mmol/L


(21-32)


 


Anion Gap 10 (6-14) 


 


Blood Urea Nitrogen


  12 mg/dL


(8-26)


 


Creatinine


  0.7 mg/dL


(0.7-1.3)


 


Estimated GFR


(Cockcroft-Gault) 142.7 


 


 


BUN/Creatinine Ratio 17 (6-20) 


 


Glucose Level


  92 mg/dL


(70-99)


 


Calcium Level


  9.4 mg/dL


(8.5-10.1)


 


Total Bilirubin


  1.1 mg/dL


(0.2-1.0)


 


Aspartate Amino Transf


(AST/SGOT) 166 U/L


(15-37)


 


Alanine Aminotransferase


(ALT/SGPT) 150 U/L


(16-63)


 


Alkaline Phosphatase


  100 U/L


()


 


Total Protein


  7.9 g/dL


(6.4-8.2)


 


Albumin


  3.5 g/dL


(3.4-5.0)


 


Albumin/Globulin Ratio 0.8 (1.0-1.7) 


 


Amylase Level


  586 U/L


()


 


Lipase


  2553 U/L


()








Medications





Active Scripts








 Medications  Dose


 Route/Sig


 Max Daily Dose Days Date Category


 


 No Active


 Prescriptions or


 Reported


 Medications  


 


     Rx











Impression


.


1.  Hemoptysis, suspect secondary to reactivation of tuberculosis, possibly


malignancy.


2.  Abnormal CT of the chest as indicated above.  Differential diagnosis


includes reactivation of tuberculosis versus malignancy.  Doubt Aspergillus.


3.  Elevated LFTs.


4.  Possible HIV.


5.  Tobacco dependence.


6. ETOH Abuse





Plan


.


WILL AWAIT FINAL ON BAL, 


HIV NEGATIVE


CONTINUE ANITBX


FOLLOW GI INPUT


MONITOR FOR ETOH WITHDRAWAL











NADIA ANNE MD Sep 28, 2017 09:35

## 2017-09-28 NOTE — PDOC
Infectious Disease Note


Subjective


Subjective


feeling better





ROS


ROS


GEN: Denies fevers, chills, sweats


HEENT: Denies blurred vision, sore throat


CV: Denies chest pain


RESP: Denies shortness of air, cough


GI: Denies n/v/d


NEURO: Denies confusion, dizziness


MSK: Denies weakness, joint pain/swelling





Vital Sign


Vital Signs





Vital Signs








  Date Time  Temp Pulse Resp B/P (MAP) Pulse Ox O2 Delivery O2 Flow Rate FiO2


 


9/28/17 08:00      Room Air  


 


9/28/17 07:00 97.7 90 18 116/89 (98) 96   





 97.7       


 


9/27/17 12:47       4 











Physical Exam


PHYSICAL EXAM


GENERAL:  NAD, Alert


HEENT:  PERRL, OC/OP


NECK:  Supple, no JVD, no LN


LUNGS:  Clear


HEART:  S1S2, no gallop, no murmur


ABD:  Soft, NT, no organomegaly, no rebound


EXT:  No edema, no cyanosis


CNS:  Alert, oriented x 3, no focal neurologic deficit


SKIN:  No rash


IV: ok





Labs


Lab





Laboratory Tests








Test


  9/28/17


06:00


 


White Blood Count


  5.7 x10^3/uL


(4.0-11.0)


 


Red Blood Count


  4.19 x10^6/uL


(4.30-5.70)


 


Hemoglobin


  13.9 g/dL


(13.0-17.5)


 


Hematocrit


  39.9 %


(39.0-53.0)


 


Mean Corpuscular Volume 95 fL () 


 


Mean Corpuscular Hemoglobin 33 pg (25-35) 


 


Mean Corpuscular Hemoglobin


Concent 35 g/dL


(31-37)


 


Red Cell Distribution Width


  13.7 %


(11.5-14.5)


 


Platelet Count


  37 x10^3/uL


(140-400)


 


Erythrocyte Sedimentation Rate 25 (0-15) 


 


Sodium Level


  135 mmol/L


(136-145)


 


Potassium Level


  4.0 mmol/L


(3.5-5.1)


 


Chloride Level


  96 mmol/L


()


 


Carbon Dioxide Level


  29 mmol/L


(21-32)


 


Anion Gap 10 (6-14) 


 


Blood Urea Nitrogen


  12 mg/dL


(8-26)


 


Creatinine


  0.7 mg/dL


(0.7-1.3)


 


Estimated GFR


(Cockcroft-Gault) 142.7 


 


 


BUN/Creatinine Ratio 17 (6-20) 


 


Glucose Level


  92 mg/dL


(70-99)


 


Calcium Level


  9.4 mg/dL


(8.5-10.1)


 


Total Bilirubin


  1.1 mg/dL


(0.2-1.0)


 


Aspartate Amino Transf


(AST/SGOT) 166 U/L


(15-37)


 


Alanine Aminotransferase


(ALT/SGPT) 150 U/L


(16-63)


 


Alkaline Phosphatase


  100 U/L


()


 


Total Protein


  7.9 g/dL


(6.4-8.2)


 


Albumin


  3.5 g/dL


(3.4-5.0)


 


Albumin/Globulin Ratio 0.8 (1.0-1.7) 


 


Amylase Level


  586 U/L


()


 


Lipase


  2553 U/L


()











Objective


Assessment





Hemoptysis


Cavitary lung infiltrate,, 


Chachexia


Leukopenia


Elevated LFT


h/o TB treated about 9 years ago





Plan


Plan of Care


 HIV neg


G stain vlad, HORTENCIA Wallace MD Sep 28, 2017 10:21

## 2017-09-28 NOTE — PDOC2
GI CONSULT


Reason For Consult:


Pancreatitis, hepatitis





HPI:


HPI:


50 y/o male admitted w/ hemoptysis.  H/o TB w/ right lung cavitary mass and 

thrombocytopenia (plt 37).  Underwent bronchoscopy yesterday, also followed by 

ID and heme/onc.  GI asked to see re: elevated LFTs and lipase.  Bili 1.1 (from 

0.6),  (from 456),  (from 241), Alk Phos 100 (from 117), lipase 

2553 (from 410).  Hepatitis panel and HIV were negative.  Does have h/o liver 

disease w/ some sort of treatment at .  Additional h/o alcoholism, drinks 

about 6 beers daily.  Tells me maybe had a little heartburn this morning, 

usually not bothersome.  On PPI here.  Has had vomiting off and on, feeling 

better today.  Denies bleeding.  Denies diarrhea and constipation.  

Intermittent abd pain.  Probably losing weight.  Likes to eat broccoli.  No 

previous EGD or colonoscopy.





PMH:


PMH:


per chart - HTN, COPD, TB, anxiety, ?GERD, liver disease





FH:


Family History:  No pertinent hx





Social History:


Smoke:  1 pack per day


ALCOHOL:  heavy (6 beers daily (indicates ~12 oz each))


Drugs:  Marijuana





ROS:


Difficult historian.


GEN: Denies fevers.


HEENT: Denies sore throat


CV: Denies chest pain


RESP: Denies shortness of air


GI: Per HPI


: Denies hematuria


ENDO: +weight loss


NEURO: Denies confusion, dizziness


MSK: Denies weakness


SKIN: Denies jaundice, pruritus





Vitals:


Vitals:





 Vital Signs








  Date Time  Temp Pulse Resp B/P (MAP) Pulse Ox O2 Delivery O2 Flow Rate FiO2


 


9/28/17 11:00 97.9 91 18 129/90 (103) 96 Room Air  





 97.9       


 


9/27/17 12:47       4 











Labs:


Labs:





Laboratory Tests








Test


  9/28/17


06:00


 


White Blood Count


  5.7 x10^3/uL


(4.0-11.0)


 


Red Blood Count


  4.19 x10^6/uL


(4.30-5.70)


 


Hemoglobin


  13.9 g/dL


(13.0-17.5)


 


Hematocrit


  39.9 %


(39.0-53.0)


 


Mean Corpuscular Volume 95 fL () 


 


Mean Corpuscular Hemoglobin 33 pg (25-35) 


 


Mean Corpuscular Hemoglobin


Concent 35 g/dL


(31-37)


 


Red Cell Distribution Width


  13.7 %


(11.5-14.5)


 


Platelet Count


  37 x10^3/uL


(140-400)


 


Erythrocyte Sedimentation Rate 25 (0-15) 


 


Sodium Level


  135 mmol/L


(136-145)


 


Potassium Level


  4.0 mmol/L


(3.5-5.1)


 


Chloride Level


  96 mmol/L


()


 


Carbon Dioxide Level


  29 mmol/L


(21-32)


 


Anion Gap 10 (6-14) 


 


Blood Urea Nitrogen


  12 mg/dL


(8-26)


 


Creatinine


  0.7 mg/dL


(0.7-1.3)


 


Estimated GFR


(Cockcroft-Gault) 142.7 


 


 


BUN/Creatinine Ratio 17 (6-20) 


 


Glucose Level


  92 mg/dL


(70-99)


 


Calcium Level


  9.4 mg/dL


(8.5-10.1)


 


Total Bilirubin


  1.1 mg/dL


(0.2-1.0)


 


Aspartate Amino Transf


(AST/SGOT) 166 U/L


(15-37)


 


Alanine Aminotransferase


(ALT/SGPT) 150 U/L


(16-63)


 


Alkaline Phosphatase


  100 U/L


()


 


Total Protein


  7.9 g/dL


(6.4-8.2)


 


Albumin


  3.5 g/dL


(3.4-5.0)


 


Albumin/Globulin Ratio 0.8 (1.0-1.7) 


 


Amylase Level


  586 U/L


()


 


Lipase


  2553 U/L


()











Allergies:


Coded Allergies:  


     No Known Drug Allergies (Unverified , 9/27/17)





Medications:





Current Medications








 Medications


  (Trade)  Dose


 Ordered  Sig/Yg


 Route


 PRN Reason  Start Time


 Stop Time Status Last Admin


Dose Admin


 


 Nicotine


  (Nicoderm Cq


 21mg)  1 patch  DAILY


 TD


   9/27/17 14:15


    9/28/17 10:25


 


 


 Lorazepam


  (Ativan)  1 mg  PRN Q4HRS  PRN


 PO


 ANXIETY / AGITATION  9/27/17 14:00


    9/27/17 14:21


 


 


 Multivitamins 10


 ml/Thiamine HCl


 100 mg/Folic Acid


 1 mg/Sodium


 Chloride  1,011.2 ml


  @ 100 mls/


 hr  DAILY


 IV


   9/27/17 17:30


 10/3/17 17:29  9/28/17 10:26


 


 


 Lorazepam


  (Ativan)  2 mg  PRN Q1HR  PRN


 IV


 For CIWA 8-14  9/27/17 17:00


    9/28/17 03:50


 


 


 Clonidine HCl


  (Catapres)  0.1 mg  PRN Q1HR  PRN


 PO


 SBP > 180 or DBP > 100, MRX3  9/27/17 17:00


    9/27/17 22:47


 


 


 Amlodipine


 Besylate


  (Norvasc)  10 mg  DAILY


 PO


   9/27/17 18:30


    9/28/17 10:24


 











Imaging:


Imaging:


CXR 9/25/17


1. Region of opacification identified in the right upper lobe of the lung could 

be volume loss or consolidation or chronic infectious etiology grossly similar 

to prior exam from 2013.





Chest CTA 9/25/17


IMPRESSION:


1. 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


2. There is masslike consolidation at the right lung apex with areas of central 

air cavitation, progressed since the prior examination from July 4, 2013. There 

is volume loss and scarring involving the right lung with multiple parenchymal 

nodular opacities. Findings may be seen in the setting of aspergillosis or 

secondary tuberculosis. Alternate etiology would be a primary lung malignancy. 

Additional considerations may include an inflammatory process such as a 

pneumoconiosis/silicosis with superimposed fungal infection. Hyperdense 

mediastinal lymphadenopathy is present.


3. Diffuse hepatic steatosis.





CARRILLO US 9/26/17


FINDINGS: There is hepatic steatosis. No focal hepatic lesion is seen. The 

gallbladder is unremarkable. The common bile duct is normal in caliber. The 

right kidney measures 11.6 cm [id number] and is unremarkable. The pancreas is 

obscured due to bowel gas. The inferior vena cava is patent. The aorta is not 

assessed.


IMPRESSION:


1. Hepatic steatosis.


2. Obscured pancreas due to bowel gas.





Bronchoscopy 9/27/17


FINDINGS:


1.  Normal vocal cords.


2.  No endobronchial lesion.


3.  No active bleeding.


PLAN:  We will await the BAL results from the right upper lobe.





PE:





GEN: thin


HEENT: Atraumatic, PERRL


LUNGS: diminished


HEART: RRR


ABD: non-tender, BS+


EXTREMITY: No edema


SKIN: No rashes, no jaundice


NEURO/PSYCH: A & O 3





A/P:


A/P:


Right lung cavitary lesion


-s/p bronch





Thrombocytopenia





Alcohol abuse





Abnormal LFTs, elevated lipase


-Hep panel neg


-hepatic steatosis on CT and US





Heartburn


-on PPI





--


Was drinking soda when I saw him, anxious to go home.


Doesn't seem to have bothersome pancreatitis symptoms currently - taking some PO

, denies pain.


Other per Dr. Moon.











KATHLEEN SHAIKH Sep 28, 2017 12:33

## 2017-09-28 NOTE — PDOC
SUBJECTIVE


Subjective


has been anxious and confused due to ETOH withdrwal, seems comfortable now , 

sleepy





OBJECTIVE


Vital Signs





Vital Signs








  Date Time  Temp Pulse Resp B/P (MAP) Pulse Ox O2 Delivery O2 Flow Rate FiO2


 


9/28/17 08:00      Room Air  


 


9/28/17 07:00 97.7 90 18 116/89 (98) 96 Room Air  





 97.7       


 


9/28/17 03:03 96.4 100 17 131/93 (106) 99 Room Air  





 96.4       


 


9/27/17 22:47  124  146/108    


 


9/27/17 22:19  124 18 146/108 (121) 98 Room Air  


 


9/27/17 21:11   16   Room Air  


 


9/27/17 20:59 97.6 124 18 127/101 (110) 100 Room Air  





 97.6       


 


9/27/17 20:41   17   Room Air  


 


9/27/17 20:00      Room Air  


 


9/27/17 19:14 98.4 141 20 146/103 (117) 94 Room Air  





 98.4       


 


9/27/17 19:10     94   


 


9/27/17 18:41   20   Room Air  


 


9/27/17 18:26  128  163/110    


 


9/27/17 15:10 98.6 128 16 163/110 (127) 83 Room Air  





 98.6       


 


9/27/17 14:40  116  158/119 (132) 94 Room Air  


 


9/27/17 14:25  110  149/117 (128) 94 Room Air  


 


9/27/17 14:10  100  152/112 (125) 96 Room Air  


 


9/27/17 13:55  101  151/113 (126) 91 Room Air  


 


9/27/17 13:40 98.3 118 18 170/110 (130) 90 Room Air  





 98.3       


 


9/27/17 13:03 97.0 116 18 160/89 94 Room Air  





 97.0       


 


9/27/17 12:47 97.0 120 18 150/108 98 Nasal Cannula 4 





 97.0       


 


9/27/17 12:32 97.0 118 18 174/111 98 Room Air 4 





 97.0       


 


9/27/17 11:34      Room Air  


 


9/27/17 11:34 97 98 18  98   





 97.0       


 


9/27/17 11:21 98.2 101 20 146/112 (123) 98 Room Air  





 98.2       











PHYSICAL EXAM


Physical Exam


lungs with decrease BS


heart RRR


abd mild epigastric tenderness


ext no edema





ASSESSMENT/PLAN


Assessment/Plan


1- lung mass with cavitation S/P bronchoscopy


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


3- hepatitis seems alcoholic , HIV neg


4-weight loss


5- thrombocytopenia reactive discussed with Ophelia cruz only if 

clinical evidence of bleed


6-Gastritis on PPI and antiemetics


7-panceatitis likely alcoholic


await bronchoscopy results


Problems:  





COMMENT


Lab





Laboratory Tests








Test


  9/28/17


06:00


 


White Blood Count


  5.7 x10^3/uL


(4.0-11.0)


 


Red Blood Count


  4.19 x10^6/uL


(4.30-5.70)


 


Hemoglobin


  13.9 g/dL


(13.0-17.5)


 


Hematocrit


  39.9 %


(39.0-53.0)


 


Mean Corpuscular Volume 95 fL () 


 


Mean Corpuscular Hemoglobin 33 pg (25-35) 


 


Mean Corpuscular Hemoglobin


Concent 35 g/dL


(31-37)


 


Red Cell Distribution Width


  13.7 %


(11.5-14.5)


 


Platelet Count


  37 x10^3/uL


(140-400)


 


Erythrocyte Sedimentation Rate 25 (0-15) 


 


Sodium Level


  135 mmol/L


(136-145)


 


Potassium Level


  4.0 mmol/L


(3.5-5.1)


 


Chloride Level


  96 mmol/L


()


 


Carbon Dioxide Level


  29 mmol/L


(21-32)


 


Anion Gap 10 (6-14) 


 


Blood Urea Nitrogen


  12 mg/dL


(8-26)


 


Creatinine


  0.7 mg/dL


(0.7-1.3)


 


Estimated GFR


(Cockcroft-Gault) 142.7 


 


 


BUN/Creatinine Ratio 17 (6-20) 


 


Glucose Level


  92 mg/dL


(70-99)


 


Calcium Level


  9.4 mg/dL


(8.5-10.1)


 


Total Bilirubin


  1.1 mg/dL


(0.2-1.0)


 


Aspartate Amino Transf


(AST/SGOT) 166 U/L


(15-37)


 


Alanine Aminotransferase


(ALT/SGPT) 150 U/L


(16-63)


 


Alkaline Phosphatase


  100 U/L


()


 


Total Protein


  7.9 g/dL


(6.4-8.2)


 


Albumin


  3.5 g/dL


(3.4-5.0)


 


Albumin/Globulin Ratio 0.8 (1.0-1.7) 


 


Amylase Level


  586 U/L


()


 


Lipase


  2553 U/L


()

















TROY HAMMER MD Sep 28, 2017 10:02

## 2017-09-29 VITALS — DIASTOLIC BLOOD PRESSURE: 116 MMHG | SYSTOLIC BLOOD PRESSURE: 149 MMHG

## 2017-09-29 VITALS — DIASTOLIC BLOOD PRESSURE: 91 MMHG | SYSTOLIC BLOOD PRESSURE: 136 MMHG

## 2017-09-29 VITALS — SYSTOLIC BLOOD PRESSURE: 138 MMHG | DIASTOLIC BLOOD PRESSURE: 112 MMHG

## 2017-09-29 VITALS — DIASTOLIC BLOOD PRESSURE: 70 MMHG | SYSTOLIC BLOOD PRESSURE: 146 MMHG

## 2017-09-29 LAB
ALBUMIN SERPL-MCNC: 3.2 G/DL (ref 3.4–5)
ALBUMIN/GLOB SERPL: 0.7 {RATIO} (ref 1–1.7)
ALP SERPL-CCNC: 89 U/L (ref 46–116)
ALT SERPL-CCNC: 127 U/L (ref 16–63)
ANION GAP SERPL CALC-SCNC: 7 MMOL/L (ref 6–14)
AST SERPL-CCNC: 137 U/L (ref 15–37)
BASOPHILS # BLD AUTO: 0 X10^3/UL (ref 0–0.2)
BASOPHILS NFR BLD: 0 % (ref 0–3)
BILIRUB SERPL-MCNC: 0.9 MG/DL (ref 0.2–1)
BUN SERPL-MCNC: 9 MG/DL (ref 8–26)
BUN/CREAT SERPL: 15 (ref 6–20)
CALCIUM SERPL-MCNC: 9.3 MG/DL (ref 8.5–10.1)
CHLORIDE SERPL-SCNC: 98 MMOL/L (ref 98–107)
CO2 SERPL-SCNC: 30 MMOL/L (ref 21–32)
CREAT SERPL-MCNC: 0.6 MG/DL (ref 0.7–1.3)
EOSINOPHIL NFR BLD: 1 % (ref 0–3)
ERYTHROCYTE [DISTWIDTH] IN BLOOD BY AUTOMATED COUNT: 13.4 % (ref 11.5–14.5)
GFR SERPLBLD BASED ON 1.73 SQ M-ARVRAT: 170.5 ML/MIN
GLOBULIN SER-MCNC: 4.3 G/DL (ref 2.2–3.8)
GLUCOSE SERPL-MCNC: 81 MG/DL (ref 70–99)
HCT VFR BLD CALC: 36.7 % (ref 39–53)
HGB BLD-MCNC: 12.5 G/DL (ref 13–17.5)
LYMPHOCYTES # BLD: 0.5 X10^3/UL (ref 1–4.8)
LYMPHOCYTES NFR BLD AUTO: 14 % (ref 24–48)
MCH RBC QN AUTO: 33 PG (ref 25–35)
MCHC RBC AUTO-ENTMCNC: 34 G/DL (ref 31–37)
MCV RBC AUTO: 96 FL (ref 79–100)
MONOCYTES NFR BLD: 19 % (ref 0–9)
NEUTROPHILS NFR BLD AUTO: 66 % (ref 31–73)
PLATELET # BLD AUTO: 39 X10^3/UL (ref 140–400)
POTASSIUM SERPL-SCNC: 3.3 MMOL/L (ref 3.5–5.1)
PROT SERPL-MCNC: 7.5 G/DL (ref 6.4–8.2)
RBC # BLD AUTO: 3.82 X10^6/UL (ref 4.3–5.7)
SODIUM SERPL-SCNC: 135 MMOL/L (ref 136–145)
WBC # BLD AUTO: 3.8 X10^3/UL (ref 4–11)

## 2017-09-29 RX ADMIN — AMOXICILLIN AND CLAVULANATE POTASSIUM SCH TAB: 875; 125 TABLET, FILM COATED ORAL at 09:25

## 2017-09-29 RX ADMIN — NICOTINE SCH PATCH: 21 PATCH, EXTENDED RELEASE TOPICAL at 09:13

## 2017-09-29 RX ADMIN — PANTOPRAZOLE SODIUM SCH MG: 40 TABLET, DELAYED RELEASE ORAL at 09:12

## 2017-09-29 RX ADMIN — FOLIC ACID SCH MLS/HR: 5 INJECTION, SOLUTION INTRAMUSCULAR; INTRAVENOUS; SUBCUTANEOUS at 09:13

## 2017-09-29 NOTE — PDOC
Subjective:


Subjective:


Feeling well, wants to leave.


Denies pain, eating w/o issue.


Wants to get up and walk around.





Objective:


Vital Signs:





 Vital Signs








  Date Time  Temp Pulse Resp B/P (MAP) Pulse Ox O2 Delivery O2 Flow Rate FiO2


 


9/29/17 11:21 98.6 78 20 149/116 (127) 100 Room Air  





 98.6       








Labs:





Laboratory Tests








Test


  9/29/17


08:45


 


White Blood Count 3.8 x10^3/uL 


 


Red Blood Count 3.82 x10^6/uL 


 


Hemoglobin 12.5 g/dL 


 


Hematocrit 36.7 % 


 


Mean Corpuscular Volume 96 fL 


 


Mean Corpuscular Hemoglobin 33 pg 


 


Mean Corpuscular Hemoglobin


Concent 34 g/dL 


 


 


Red Cell Distribution Width 13.4 % 


 


Platelet Count 39 x10^3/uL 


 


Neutrophils (%) (Auto) 66 % 


 


Lymphocytes (%) (Auto) 14 % 


 


Monocytes (%) (Auto) 19 % 


 


Eosinophils (%) (Auto) 1 % 


 


Basophils (%) (Auto) 0 % 


 


Neutrophils # (Auto) 2.5 x10^3uL 


 


Lymphocytes # (Auto) 0.5 x10^3/uL 


 


Monocytes # (Auto) 0.7 x10^3/uL 


 


Eosinophils # (Auto) 0.0 x10^3/uL 


 


Basophils # (Auto) 0.0 x10^3/uL 


 


Sodium Level 135 mmol/L 


 


Potassium Level 3.3 mmol/L 


 


Chloride Level 98 mmol/L 


 


Carbon Dioxide Level 30 mmol/L 


 


Anion Gap 7 


 


Blood Urea Nitrogen 9 mg/dL 


 


Creatinine 0.6 mg/dL 


 


Estimated GFR


(Cockcroft-Gault) 170.5 


 


 


BUN/Creatinine Ratio 15 


 


Glucose Level 81 mg/dL 


 


Calcium Level 9.3 mg/dL 


 


Total Bilirubin 0.9 mg/dL 


 


Aspartate Amino Transf


(AST/SGOT) 137 U/L 


 


 


Alanine Aminotransferase


(ALT/SGPT) 127 U/L 


 


 


Alkaline Phosphatase 89 U/L 


 


Total Protein 7.5 g/dL 


 


Albumin 3.2 g/dL 


 


Albumin/Globulin Ratio 0.7 


 


Lipase 1741 U/L 





  AFB SPECIMEN PROCESSING  Final  


        Concentration





  AFB CULTURE FINAL  


                                PENDING





  AFB CULTURE GRAM STAIN  Final  


        Negative





        Performed at:   - [facility] [id number], [place], [place]  [id number]


        : REBECA Balderrama MD, Phone:  [phone]





  FUNGAL CULTURE,OTHER  


                                PENDING





  LANE CULT RES 1  


                                PENDING





 GRAM STAIN  Final  


        WBCS                        MODERATE


        RBCS                        MODERATE


        GRAM POSITIVE COCCI         FEW


        GRAM POSITIVE RODS          FEW


        GRAM NEGATIVE RODS          FEW


        COMMENTS                    OCCASIONAL CILIATED EPITHELIAL CELL





PE:





GEN: thin, sitting on edge of bed eating lunch


NEURO/PSYCH: A & O 3, more pleasant today





A/P:


Lung mass





Alcohol abuse w/ abnormal LFTs, elevated lipase - improved


-also thrombocytopenia


-Hep panel neg


-hepatic steatosis on CT and US





--


Abstinence.


DC per primary.











KATHLEEN SHAIKH Sep 29, 2017 12:48

## 2017-09-29 NOTE — PDOC3
Discharge Summary*


Date of Admission:  Sep 25, 2017


Date of Discharge:  Sep 29, 2017


Admitting Diagnosis


Problems


Medical Problems:


(1) Active tuberculosis


Status: Acute  





(2) Hemoptysis


Status: Acute  








Problems:  


Final Diagnosis


1- lung mass with cavitation S/P bronchoscopy on Augmentin


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


3- hepatitis seems alcoholic , HIV neg


4-weight loss


5- thrombocytopenia reactive discussed with Ophelia Piedra transfuse platlets only if 

clinical evidence of bleed


6-Gastritis on PPI and antiemetics


7-panceatitis likely alcoholic





Problems


Medical Problems:


(1) Active tuberculosis


Status: Acute  





(2) Hemoptysis


Status: Acute  





CONSULTS


ID, Pulmonary, Hematology, GI


Procedures


CXR, CT chest, Bronchoscopy, liver sono


Brief Hospital Course


Mr. Amador  is a 51 old [sex] who presented with [ ]


Disposition/Orders:  D/C to Home w/ HH


CONDITION AT DISCHARGE:  Stable


Diet:  Cardiac


No Active Prescriptions or Reported Meds


FOLLOW UP APPOINTMENT:  


discharged on Augmentin and norvasc 10 mg, F/U Dr ventura 2 week


, Dr. Otoole 2 weeks


Time Spent


Total time spent with patient [] minutes for coordination of care, counseling, 

and education.











TROY HAMMER MD Sep 29, 2017 13:33

## 2017-09-29 NOTE — PDOC
PULMONARY PROGRESS NOTES


Subjective


no new symptoms


Vitals





Vital Signs








  Date Time  Temp Pulse Resp B/P (MAP) Pulse Ox O2 Delivery O2 Flow Rate FiO2


 


9/29/17 07:45 98.2 88 20 146/70 (95) 97 Room Air  





 98.2       








ROS:  No Nausea, No Chest Pain, No Abdominal Pain, No Increase Cough


Lungs:  Clear


Cardiovascular:  S1


Abdomen:  Soft


Neuro Exam:  Alert


Extremities:  No Edema


Skin:  Warm


Labs





Laboratory Tests








Test


  9/27/17


12:45 9/28/17


06:00


 


Miscellaneous Test   


 


White Blood Count


  


  5.7 x10^3/uL


(4.0-11.0)


 


Red Blood Count


  


  4.19 x10^6/uL


(4.30-5.70)


 


Hemoglobin


  


  13.9 g/dL


(13.0-17.5)


 


Hematocrit


  


  39.9 %


(39.0-53.0)


 


Mean Corpuscular Volume  95 fL () 


 


Mean Corpuscular Hemoglobin  33 pg (25-35) 


 


Mean Corpuscular Hemoglobin


Concent 


  35 g/dL


(31-37)


 


Red Cell Distribution Width


  


  13.7 %


(11.5-14.5)


 


Platelet Count


  


  37 x10^3/uL


(140-400)


 


Erythrocyte Sedimentation Rate  25 (0-15) 


 


Sodium Level


  


  135 mmol/L


(136-145)


 


Potassium Level


  


  4.0 mmol/L


(3.5-5.1)


 


Chloride Level


  


  96 mmol/L


()


 


Carbon Dioxide Level


  


  29 mmol/L


(21-32)


 


Anion Gap  10 (6-14) 


 


Blood Urea Nitrogen


  


  12 mg/dL


(8-26)


 


Creatinine


  


  0.7 mg/dL


(0.7-1.3)


 


Estimated GFR


(Cockcroft-Gault) 


  142.7 


 


 


BUN/Creatinine Ratio  17 (6-20) 


 


Glucose Level


  


  92 mg/dL


(70-99)


 


Calcium Level


  


  9.4 mg/dL


(8.5-10.1)


 


Total Bilirubin


  


  1.1 mg/dL


(0.2-1.0)


 


Aspartate Amino Transf


(AST/SGOT) 


  166 U/L


(15-37)


 


Alanine Aminotransferase


(ALT/SGPT) 


  150 U/L


(16-63)


 


Alkaline Phosphatase


  


  100 U/L


()


 


Total Protein


  


  7.9 g/dL


(6.4-8.2)


 


Albumin


  


  3.5 g/dL


(3.4-5.0)


 


Albumin/Globulin Ratio  0.8 (1.0-1.7) 


 


Amylase Level


  


  586 U/L


()


 


Lipase


  


  2553 U/L


()








Medications





Active Scripts








 Medications  Dose


 Route/Sig


 Max Daily Dose Days Date Category


 


 No Active


 Prescriptions or


 Reported


 Medications  


 


     Rx











Impression


.


1.  Hemoptysis, suspect secondary to reactivation of tuberculosis, possibly


malignancy.


2.  Abnormal CT of the chest as indicated above.  S/P BRONCH NO ENDO LESION


3.  Elevated LFTs.


4.  Possible HIV.


5.  Tobacco dependence.


6. ETOH Abuse





Plan


.


NEGATIVE AFB SPOKE WITH DR CLAYTON


D/C OK ON AUGMENTIN


FOLLOW UP IN MY OFFICE IN 2-4WEEKS


HIV NEGATIVE











NADIA ANNE MD Sep 29, 2017 09:25

## 2017-09-29 NOTE — PDOC
SUBJECTIVE


Subjective


looks better , alert and calm, eating breakfast, anxious to go home but better 

mood today





OBJECTIVE


Vital Signs





Vital Signs








  Date Time  Temp Pulse Resp B/P (MAP) Pulse Ox O2 Delivery O2 Flow Rate FiO2


 


9/29/17 07:45 98.2 88 20 146/70 (95) 97 Room Air  





 98.2       


 


9/29/17 03:00 97.7 84 16 136/91 (106) 99 Room Air  





 97.7       


 


9/28/17 23:02 97.9 80 18 124/88 (100) 98 Room Air  





 97.9       


 


9/28/17 21:02      Room Air  


 


9/28/17 20:32      Room Air  


 


9/28/17 20:00      Room Air  


 


9/28/17 19:23 96.3 97 18 137/106 (116) 96 Room Air  





 96.3       


 


9/28/17 15:00 97.7 95 18 137/98 (111) 98 Room Air  





 97.7       


 


9/28/17 11:00 97.9 91 18 129/90 (103) 96 Room Air  





 97.9       


 


9/28/17 10:24  90  116/89    











PHYSICAL EXAM


Physical Exam


no change





ASSESSMENT/PLAN


Assessment/Plan


1- lung mass with cavitation S/P bronchoscopy on Augmentin


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


3- hepatitis seems alcoholic , HIV neg


4-weight loss


5- thrombocytopenia reactive discussed with Ophelia Piedra transfuse platlets only if 

clinical evidence of bleed


6-Gastritis on PPI and antiemetics


7-panceatitis likely alcoholic





he seems stable currently only on Augmentin P.O , tolerating diet , more calm 

and appropriate, await bronch results might be able to go home with HH and f/u 

out pt if consultant agree discussed to stop ETOH


Problems:  











TROY HAMMER MD Sep 29, 2017 09:21

## 2017-09-29 NOTE — PDOC
Infectious Disease Note


Subjective


Subjective


feeling better, wants to go home





ROS


ROS


GEN: Denies fevers, chills, sweats


HEENT: Denies blurred vision, sore throat


CV: Denies chest pain


RESP: Denies shortness of air, cough


GI: Denies n/v/d


NEURO: Denies confusion, dizziness


MSK: Denies weakness, joint pain/swelling





Vital Sign


Vital Signs





Vital Signs








  Date Time  Temp Pulse Resp B/P (MAP) Pulse Ox O2 Delivery O2 Flow Rate FiO2


 


9/29/17 09:25  88  146/70    


 


9/29/17 07:45 98.2  20  97 Room Air  





 98.2       











Physical Exam


PHYSICAL EXAM


GENERAL:  NAD, Alert


HEENT:  PERRL, OC/OP


NECK:  Supple, no JVD, no LN


LUNGS:  Clear


HEART:  S1S2, no gallop, no murmur


ABD:  Soft, NT, no organomegaly, no rebound


EXT:  No edema, no cyanosis


CNS:  Alert, oriented x 3, no focal neurologic deficit


SKIN:  No rash


IV: ok





Labs


Micro


BAL, afb stain neg


culture pending





Objective


Assessment





Hemoptysis


Cavitary lung infiltrate,, 


Chachexia


Leukopenia


Elevated LFT


h/o TB treated about 9 years ago





Plan


Plan of Care


 HIV neg


G stain noted, augmentin


ok to d/c 


f/u with us in 2-3 wks


check on culture , pt to call our office on monday for regular culture results 

and adjustment if needed











HORTENCIA CLAYTON MD Sep 29, 2017 09:33

## 2018-09-29 ENCOUNTER — HOSPITAL ENCOUNTER (EMERGENCY)
Dept: HOSPITAL 61 - ER | Age: 53
Discharge: HOME | End: 2018-09-29
Payer: MEDICARE

## 2018-09-29 VITALS
SYSTOLIC BLOOD PRESSURE: 138 MMHG | DIASTOLIC BLOOD PRESSURE: 94 MMHG | SYSTOLIC BLOOD PRESSURE: 138 MMHG | DIASTOLIC BLOOD PRESSURE: 94 MMHG

## 2018-09-29 VITALS — WEIGHT: 132 LBS | HEIGHT: 71 IN | BODY MASS INDEX: 18.48 KG/M2

## 2018-09-29 DIAGNOSIS — S13.9XXA: Primary | ICD-10-CM
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
## 2018-09-29 DIAGNOSIS — R51: ICD-10-CM
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
## 2018-09-29 DIAGNOSIS — F10.20: ICD-10-CM
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
## 2018-09-29 DIAGNOSIS — I10: ICD-10-CM

## 2018-09-29 PROCEDURE — 70450 CT HEAD/BRAIN W/O DYE: CPT

## 2018-09-29 PROCEDURE — 72040 X-RAY EXAM NECK SPINE 2-3 VW: CPT
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
## 2019-03-11 ENCOUNTER — HOSPITAL ENCOUNTER (OUTPATIENT)
Dept: HOSPITAL 61 - CT | Age: 54
Discharge: HOME | End: 2019-03-11
Attending: PHYSICIAN ASSISTANT
Payer: MEDICARE

## 2019-03-11 DIAGNOSIS — K76.0: ICD-10-CM

## 2019-03-11 DIAGNOSIS — J47.9: ICD-10-CM

## 2019-03-11 DIAGNOSIS — J43.9: ICD-10-CM

## 2019-03-11 DIAGNOSIS — Z86.11: ICD-10-CM

## 2019-03-11 DIAGNOSIS — J84.10: ICD-10-CM

## 2019-03-11 DIAGNOSIS — I71.2: Primary | ICD-10-CM

## 2019-03-11 PROCEDURE — 71275 CT ANGIOGRAPHY CHEST: CPT

## 2019-03-11 NOTE — RAD
CTA chest with contrast dated 3/11/2019.

 

Comparison made to 9/25/2017.

 

CLINICAL INDICATION: History of tuberculosis. Follow-up.

 

TECHNIQUE: Contiguous axial imaging the chest performed following the 

intravenous demonstration of 90 cc Omnipaque 350. Study was performed as 

dedicated PE protocol with thin cut coronal MIPS 3-D reconstruction. 

One or more of the following individualized dose reduction techniques were

utilized for this examination:  

1. Automated exposure control  

2. Adjustment of the mA and/or kV according to patient size  

3. Use of iterative reconstruction technique

 

FINDINGS:

 

Contrast is adequate. No evidence of central, lobar or segmental pulmonary

embolus. The right upper lobe pulmonary arteries are small but patent. 

Subsegmental branches are not well evaluated based on technique.

 

Heart size within normal limits. No pericardial effusion. There is mild 

aneurysmal dilation of the ascending thoracic aorta measuring 4 cm 

transverse dimension, unchanged.

 

There calcified right paratracheal and right hilar lymph nodes, unchanged.

Focal area of consolidation in the right upper lobe with bronchiectasis 

and air bronchograms and dense of pleural thickening, similar to prior 

study. There is a rounded area of low density centrally within the area of

consolidation with areas of cavitation, unchanged. A separate nodular 

density in the posterior right upper lobe along the margin of the right 

major fissure measures 2.4 cm versus 2.6 cm previously. There is scattered

calcified granuloma throughout the right lung. Moderate to severe 

emphysema with linear bands of scarring in the right upper lobe, right 

middle lobe and right lower lobe, unchanged.

 

Left lung is hyperinflated but otherwise clear. No left pleural effusion. 

No pneumothorax. No left hilar adenopathy. No axillary adenopathy. Thyroid

gland is unremarkable.

 

Limited images of the upper abdomen unremarkable. Fatty infiltration of 

the liver. No acute bony abnormality. Multilevel spondylosis.

 

IMPRESSION:

1. Right upper lobe consolidation, volume loss and pleural thickening, not

significant changed from prior study. There is bronchiectasis with areas 

of cavitation, stable. Findings are likely related to known tuberculosis.

2. Interval resolution of right upper lobe pulmonary arterial occlusion. 

No evidence of pulmonary embolus.

3. Additional nodular area of consolidation in the posterior right upper 

lobe is indeterminate but stable from prior study.

4. Calcified mediastinal and right hilar lymph nodes with scattered 

calcified granuloma throughout the right lung.

5. Emphysema.

6. Mild aneurysmal dilation of the ascending thoracic aorta, unchanged.

 

Electronically signed by: Ayden Caro MD (3/11/2019 3:01 PM) 

[id number]

## 2019-08-15 ENCOUNTER — HOSPITAL ENCOUNTER (INPATIENT)
Dept: HOSPITAL 61 - ER | Age: 54
LOS: 1 days | Discharge: HOME | DRG: 204 | End: 2019-08-16
Attending: INTERNAL MEDICINE | Admitting: INTERNAL MEDICINE
Payer: MEDICARE

## 2019-08-15 VITALS — WEIGHT: 132 LBS | BODY MASS INDEX: 18.48 KG/M2 | HEIGHT: 71 IN

## 2019-08-15 VITALS — SYSTOLIC BLOOD PRESSURE: 153 MMHG | DIASTOLIC BLOOD PRESSURE: 95 MMHG

## 2019-08-15 DIAGNOSIS — F17.210: ICD-10-CM

## 2019-08-15 DIAGNOSIS — E46: ICD-10-CM

## 2019-08-15 DIAGNOSIS — J44.9: ICD-10-CM

## 2019-08-15 DIAGNOSIS — Z82.49: ICD-10-CM

## 2019-08-15 DIAGNOSIS — I10: ICD-10-CM

## 2019-08-15 DIAGNOSIS — F41.9: ICD-10-CM

## 2019-08-15 DIAGNOSIS — K21.9: ICD-10-CM

## 2019-08-15 DIAGNOSIS — F10.20: ICD-10-CM

## 2019-08-15 DIAGNOSIS — R04.2: Primary | ICD-10-CM

## 2019-08-15 DIAGNOSIS — Z20.1: ICD-10-CM

## 2019-08-15 DIAGNOSIS — Z86.11: ICD-10-CM

## 2019-08-15 LAB
ALBUMIN SERPL-MCNC: 3.7 G/DL (ref 3.4–5)
ALBUMIN/GLOB SERPL: 0.7 {RATIO} (ref 1–1.7)
ALP SERPL-CCNC: 87 U/L (ref 46–116)
ALT SERPL-CCNC: 95 U/L (ref 16–63)
AMPHETAMINE/METHAMPHETAMINE: (no result)
ANION GAP SERPL CALC-SCNC: 13 MMOL/L (ref 6–14)
APTT PPP: YELLOW S
AST SERPL-CCNC: 132 U/L (ref 15–37)
BACTERIA #/AREA URNS HPF: 0 /HPF
BARBITURATES UR-MCNC: (no result) UG/ML
BASOPHILS # BLD AUTO: 0 X10^3/UL (ref 0–0.2)
BASOPHILS NFR BLD: 1 % (ref 0–3)
BENZODIAZ UR-MCNC: (no result) UG/L
BILIRUB SERPL-MCNC: 0.7 MG/DL (ref 0.2–1)
BILIRUB UR QL STRIP: NEGATIVE
BUN SERPL-MCNC: 6 MG/DL (ref 8–26)
BUN/CREAT SERPL: 10 (ref 6–20)
CALCIUM SERPL-MCNC: 9.3 MG/DL (ref 8.5–10.1)
CANNABINOIDS UR-MCNC: (no result) UG/L
CHLORIDE SERPL-SCNC: 99 MMOL/L (ref 98–107)
CK SERPL-CCNC: 315 U/L (ref 39–308)
CO2 SERPL-SCNC: 25 MMOL/L (ref 21–32)
COCAINE UR-MCNC: (no result) NG/ML
CREAT SERPL-MCNC: 0.6 MG/DL (ref 0.7–1.3)
EOSINOPHIL NFR BLD: 0 X10^3/UL (ref 0–0.7)
EOSINOPHIL NFR BLD: 1 % (ref 0–3)
ERYTHROCYTE [DISTWIDTH] IN BLOOD BY AUTOMATED COUNT: 13.7 % (ref 11.5–14.5)
FIBRINOGEN PPP-MCNC: CLEAR MG/DL
GFR SERPLBLD BASED ON 1.73 SQ M-ARVRAT: 169.3 ML/MIN
GLOBULIN SER-MCNC: 5 G/DL (ref 2.2–3.8)
GLUCOSE SERPL-MCNC: 72 MG/DL (ref 70–99)
HCT VFR BLD CALC: 39.3 % (ref 39–53)
HGB BLD-MCNC: 13.6 G/DL (ref 13–17.5)
LIPASE: 98 U/L (ref 73–393)
LYMPHOCYTES # BLD: 0.9 X10^3/UL (ref 1–4.8)
LYMPHOCYTES NFR BLD AUTO: 20 % (ref 24–48)
MAGNESIUM SERPL-MCNC: 1.7 MG/DL (ref 1.8–2.4)
MCH RBC QN AUTO: 34 PG (ref 25–35)
MCHC RBC AUTO-ENTMCNC: 35 G/DL (ref 31–37)
MCV RBC AUTO: 98 FL (ref 79–100)
METHADONE SERPL-MCNC: (no result) NG/ML
MONO #: 0.6 X10^3/UL (ref 0–1.1)
MONOCYTES NFR BLD: 13 % (ref 0–9)
NEUT #: 2.9 X10^3/UL (ref 1.8–7.7)
NEUTROPHILS NFR BLD AUTO: 65 % (ref 31–73)
NITRITE UR QL STRIP: NEGATIVE
OPIATES UR-MCNC: (no result) NG/ML
PCP SERPL-MCNC: (no result) MG/DL
PH UR STRIP: 5.5 [PH]
PLATELET # BLD AUTO: 101 X10^3/UL (ref 140–400)
POTASSIUM SERPL-SCNC: 3.9 MMOL/L (ref 3.5–5.1)
PROT SERPL-MCNC: 8.7 G/DL (ref 6.4–8.2)
PROT UR STRIP-MCNC: NEGATIVE MG/DL
RBC # BLD AUTO: 4.02 X10^6/UL (ref 4.3–5.7)
RBC #/AREA URNS HPF: 0 /HPF (ref 0–2)
SODIUM SERPL-SCNC: 137 MMOL/L (ref 136–145)
UROBILINOGEN UR-MCNC: 0.2 MG/DL
WBC # BLD AUTO: 4.5 X10^3/UL (ref 4–11)
WBC #/AREA URNS HPF: 0 /HPF (ref 0–4)

## 2019-08-15 PROCEDURE — 36415 COLL VENOUS BLD VENIPUNCTURE: CPT

## 2019-08-15 PROCEDURE — 83735 ASSAY OF MAGNESIUM: CPT

## 2019-08-15 PROCEDURE — 83690 ASSAY OF LIPASE: CPT

## 2019-08-15 PROCEDURE — 81001 URINALYSIS AUTO W/SCOPE: CPT

## 2019-08-15 PROCEDURE — 80053 COMPREHEN METABOLIC PANEL: CPT

## 2019-08-15 PROCEDURE — 96366 THER/PROPH/DIAG IV INF ADDON: CPT

## 2019-08-15 PROCEDURE — 71260 CT THORAX DX C+: CPT

## 2019-08-15 PROCEDURE — 80048 BASIC METABOLIC PNL TOTAL CA: CPT

## 2019-08-15 PROCEDURE — 80307 DRUG TEST PRSMV CHEM ANLYZR: CPT

## 2019-08-15 PROCEDURE — 96367 TX/PROPH/DG ADDL SEQ IV INF: CPT

## 2019-08-15 PROCEDURE — 87040 BLOOD CULTURE FOR BACTERIA: CPT

## 2019-08-15 PROCEDURE — 86703 HIV-1/HIV-2 1 RESULT ANTBDY: CPT

## 2019-08-15 PROCEDURE — 96365 THER/PROPH/DIAG IV INF INIT: CPT

## 2019-08-15 PROCEDURE — 83880 ASSAY OF NATRIURETIC PEPTIDE: CPT

## 2019-08-15 PROCEDURE — G0378 HOSPITAL OBSERVATION PER HR: HCPCS

## 2019-08-15 PROCEDURE — 82553 CREATINE MB FRACTION: CPT

## 2019-08-15 PROCEDURE — 85025 COMPLETE CBC W/AUTO DIFF WBC: CPT

## 2019-08-15 PROCEDURE — 87116 MYCOBACTERIA CULTURE: CPT

## 2019-08-15 PROCEDURE — 83605 ASSAY OF LACTIC ACID: CPT

## 2019-08-15 PROCEDURE — 84484 ASSAY OF TROPONIN QUANT: CPT

## 2019-08-15 RX ADMIN — CYCLOBENZAPRINE HYDROCHLORIDE SCH MG: 10 TABLET, FILM COATED ORAL at 23:39

## 2019-08-15 RX ADMIN — DICLOFENAC SODIUM SCH MG: 25 TABLET, DELAYED RELEASE ORAL at 23:32

## 2019-08-15 RX ADMIN — PYRAZINAMIDE SCH MG: 500 TABLET ORAL at 23:31

## 2019-08-15 RX ADMIN — ISONIAZID SCH MG: 300 TABLET ORAL at 23:31

## 2019-08-15 RX ADMIN — RIFAMPIN SCH MLS/HR: 600 INJECTION, POWDER, LYOPHILIZED, FOR SOLUTION INTRAVENOUS at 23:30

## 2019-08-15 RX ADMIN — ETHAMBUTOL HYDROCHLORIDE SCH MG: 400 TABLET ORAL at 23:31

## 2019-08-15 NOTE — RAD
Exam: CT of chest with contrast

 

INDICATION: Coughing up blood

 

TECHNIQUE: Sequential axial images through the chest obtained following 

the administration of 75 mL of Omni 300 IV contrast. Sagittal and coronal 

reformatted images were reconstructed from the axial data and reviewed.

 

Comparisons: 3/11/2019

 

FINDINGS:

Visualized portions of the thyroid are unremarkable. Calcified lymph nodes

in the right pretracheal and right hilar region are noted. No other 

mediastinal lymph nodes are seen.

 

Heart size is normal. No pericardial effusion. Thoracic aorta has a normal

course and caliber. Pulmonary artery is not enlarged.

 

Narrowing of the airway in the right upper and lower lobes again noted. 

Mild bronchial wall thickening noted in the left lung. This is stable when

compared to the prior exam. As before there is dense consolidation within 

the right apex with cavitary lesion similar when compared to the prior 

study. Adjacent groundglass opacity is also again seen. There is 

redemonstration of a spiculated nodule in the right upper lung series 2 

image 26 which measures 2.5 x 1.6 cm. Tree-in-bud nodularity noted in the 

right middle lobe.

 

No pleural effusion or thickening.

 

Visualized upper abdomen is unremarkable.

 

No suspicious osseous lesion or acute fracture.

 

IMPRESSION:

1.  Stable consolidative changes at the right apex with internal cavity 

and adjacent groundglass opacity and tree-in-bud nodularity. Differential 

considerations include tuberculosis. This is unchanged in appearance when 

compared to the study on 3/11/2019. Correlate with appropriate laboratory 

values

2.  Stable 2.5 x 1.6 cm spiculated nodule in the left upper lung. This may

also be sequela of infection however underlying malignancy is not 

excluded.

 

 

Exposure: One or more of the following in the visualized dose reduction 

techniques were utilized for this examination:

1.  Automated exposure control

2.  Adjustment of the MA and/or KV according to patient size

3.  Use of iterative of reconstructive technique

 

Electronically signed by: Andrew Chen MD (8/15/2019 5:21 PM) [facility]

## 2019-08-15 NOTE — PHYS DOC
Past Medical History


Past Medical History:  TB


 (RE SWIFT)


Past Surgical History:  No Surgical History


 (RE SWIFT)


Additional Information:  


LESS THAN A PPD


Alcohol Use:  Heavy


Drug Use:  Cocaine


Social History Narrative:  LAST USED CRACK 3 WKS AGO


 (RE SWIFT)





Adult General


Chief Complaint


Chief Complaint:  OTHER COMPLAINTS





HPI


HPI





Patient is a 53  year old male with history of TB and treated who presents to 

the ED today complaining of coughing up blood for 3 days. Patient states he was 

seen by the PCP in this send him to the ED for TB rule out. Patient denies any 

chest pain or shortness of breath. He is a very poor historian right now. He is 

refusing to give details about his symptoms and previous treatment. Denies any 

fever.


 (RE SWIFT)





Review of Systems


Review of Systems





Constitutional: Denies fever or chills []


Eyes: Denies change in visual acuity, redness, or eye pain []


HENT: Denies nasal congestion or sore throat []


Respiratory: Reports coughing up blood


Cardiovascular: No additional information not addressed in HPI []


GI: Denies abdominal pain, nausea, vomiting, bloody stools or diarrhea []


: Denies dysuria or hematuria []


Musculoskeletal: Denies back pain or joint pain []


Integument: Denies rash or skin lesions []


Neurologic: Denies headache, focal weakness or sensory changes []








All other systems were reviewed and found to be within normal limits, except as 

documented in this note.


 (RE SWIFT)





Current Medications


Current Medications





Current Medications








 Medications


  (Trade)  Dose


 Ordered  Sig/Yg  Start Time


 Stop Time Status Last Admin


Dose Admin


 


 Info


  (CONTRAST GIVEN


 -- Rx MONITORING)  1 each  PRN DAILY  PRN  8/15/19 17:00


 8/16/19 15:36 DC  





 


 Iohexol


  (Omnipaque 300


 Mg/ml)  100 ml  STK-MED ONCE  8/15/19 16:56


 8/15/19 16:57 DC  





 


 Piperacillin Sod/


 Tazobactam Sod


 4.5 gm/Sodium


 Chloride  100 ml @ 


 200 mls/hr  1X  ONCE  8/15/19 16:30


 8/16/19 11:39 DC 8/15/19 16:35


200 MLS/HR


 


 Vancomycin HCl


  (Vanco Per


 Pharmacy)  1 each  1X  ONCE  8/15/19 16:00


 8/16/19 11:39 DC 8/15/19 22:19


1 EACH


 


 Vancomycin HCl


 1.5 gm/Sodium


 Chloride  500 ml @ 


 250 mls/hr  1X  ONCE  8/15/19 16:30


 8/16/19 11:39 DC 8/15/19 17:13


250 MLS/HR





 (JEMIMA HERNANDEZ DO)





Allergies


Allergies





Allergies








Coded Allergies Type Severity Reaction Last Updated Verified


 


  No Known Drug Allergies    9/27/17 No





 (JEMIMA HERNANDEZ DO)





Physical Exam


Physical Exam





Constitutional: Well developed, well nourished, no acute distress, non-toxic 

appearance. []


HENT: Normocephalic, atraumatic, bilateral external ears normal, oropharynx 

moist, no oral exudates, nose normal. []


Eyes: PERRLA, EOMI, conjunctiva normal, no discharge. [] 


Neck: Normal range of motion, no tenderness, supple, no stridor. [] 


Cardiovascular:Heart rate regular rhythm, no murmur []


Lungs & Thorax:  Bilateral breath sounds clear to auscultation []


Abdomen: Bowel sounds normal, soft, no tenderness, no masses, no pulsatile 

masses. [] 


Skin: Warm, dry, no erythema, no rash. [] 


Back: No tenderness, no CVA tenderness. [] 


Extremities: No tenderness, no cyanosis, no clubbing, ROM intact, no edema. [] 


Neurologic: Alert and oriented X 3, normal motor function, normal sensory 

function, no focal deficits noted. []


Psychologic: Affect normal, judgement normal, mood normal. []


 (RE SWIFT)





Current Patient Data


Vital Signs





                                   Vital Signs








  Date Time  Temp Pulse Resp B/P (MAP) Pulse Ox O2 Delivery O2 Flow Rate FiO2


 


8/15/19 18:10  66  128/84 (99) 98 Room Air  


 


8/15/19 14:20 98.1  16     





 98.1       





 (JEMIMA HERNANDEZ DO)


Lab Values





                                Laboratory Tests








Test


 8/15/19


16:15 8/15/19


16:20


 


White Blood Count


 4.5 x10^3/uL


(4.0-11.0) 





 


Red Blood Count


 4.02 x10^6/uL


(4.30-5.70)  L 





 


Hemoglobin


 13.6 g/dL


(13.0-17.5) 





 


Hematocrit


 39.3 %


(39.0-53.0) 





 


Mean Corpuscular Volume


 98 fL ()


 





 


Mean Corpuscular Hemoglobin 34 pg (25-35)   


 


Mean Corpuscular Hemoglobin


Concent 35 g/dL


(31-37) 





 


Red Cell Distribution Width


 13.7 %


(11.5-14.5) 





 


Platelet Count


 101 x10^3/uL


(140-400)  L 





 


Neutrophils (%) (Auto) 65 % (31-73)   


 


Lymphocytes (%) (Auto) 20 % (24-48)  L 


 


Monocytes (%) (Auto) 13 % (0-9)  H 


 


Eosinophils (%) (Auto) 1 % (0-3)   


 


Basophils (%) (Auto) 1 % (0-3)   


 


Neutrophils # (Auto)


 2.9 x10^3/uL


(1.8-7.7) 





 


Lymphocytes # (Auto)


 0.9 x10^3/uL


(1.0-4.8)  L 





 


Monocytes # (Auto)


 0.6 x10^3/uL


(0.0-1.1) 





 


Eosinophils # (Auto)


 0.0 x10^3/uL


(0.0-0.7) 





 


Basophils # (Auto)


 0.0 x10^3/uL


(0.0-0.2) 





 


Sodium Level


 137 mmol/L


(136-145) 





 


Potassium Level


 3.9 mmol/L


(3.5-5.1) 





 


Chloride Level


 99 mmol/L


() 





 


Carbon Dioxide Level


 25 mmol/L


(21-32) 





 


Anion Gap 13 (6-14)   


 


Blood Urea Nitrogen


 6 mg/dL (8-26)


L 





 


Creatinine


 0.6 mg/dL


(0.7-1.3)  L 





 


Estimated GFR


(Cockcroft-Gault) 169.3  


 





 


BUN/Creatinine Ratio 10 (6-20)   


 


Glucose Level


 72 mg/dL


(70-99) 





 


Lactic Acid Level


 1.6 mmol/L


(0.4-2.0) 





 


Calcium Level


 9.3 mg/dL


(8.5-10.1) 





 


Magnesium Level


 1.7 mg/dL


(1.8-2.4)  L 





 


Total Bilirubin


 0.7 mg/dL


(0.2-1.0) 





 


Aspartate Amino Transferase


(AST) 132 U/L


(15-37)  H 





 


Alanine Aminotransferase (ALT)


 95 U/L (16-63)


H 





 


Alkaline Phosphatase


 87 U/L


() 





 


Creatine Kinase


 315 U/L


()  H 





 


Creatine Kinase MB (Mass)


 2.1 ng/mL


(0.0-3.6) 





 


Creatine Kinase MB Relative


Index 0.7 % (0-4)  


 





 


Troponin I Quantitative


 < 0.017 ng/mL


(0.000-0.055) 





 


NT-Pro-B-Type Natriuretic


Peptide 51 pg/mL


(0-124) 





 


Total Protein


 8.7 g/dL


(6.4-8.2)  H 





 


Albumin


 3.7 g/dL


(3.4-5.0) 





 


Albumin/Globulin Ratio


 0.7 (1.0-1.7)


L 





 


Lipase


 98 U/L


() 





 


Urine Collection Type  Unknown  


 


Urine Color  Yellow  


 


Urine Clarity  Clear  


 


Urine pH  5.5  


 


Urine Specific Gravity  <=1.005  


 


Urine Protein


 


 Negative mg/dL


(NEG-TRACE)


 


Urine Glucose (UA)


 


 Negative mg/dL


(NEG)


 


Urine Ketones (Stick)


 


 Negative mg/dL


(NEG)


 


Urine Blood


 


 Negative (NEG)





 


Urine Nitrite


 


 Negative (NEG)





 


Urine Bilirubin


 


 Negative (NEG)





 


Urine Urobilinogen Dipstick


 


 0.2 mg/dL (0.2


mg/dL)


 


Urine Leukocyte Esterase


 


 Negative (NEG)





 


Urine RBC  0 /HPF (0-2)  


 


Urine WBC  0 /HPF (0-4)  


 


Urine Bacteria


 


 0 /HPF (0-FEW)





 


Urine Opiates Screen  Neg (NEG)  


 


Urine Methadone Screen  Neg (NEG)  


 


Urine Barbiturates  Neg (NEG)  


 


Urine Phencyclidine Screen  Neg (NEG)  


 


Urine


Amphetamine/Methamphetamine 


 Neg (NEG)  





 


Urine Benzodiazepines Screen  Neg (NEG)  


 


Urine Cocaine Screen  Neg (NEG)  


 


Urine Cannabinoids Screen  Neg (NEG)  


 


Urine Ethyl Alcohol  Pos (NEG)  





                                Laboratory Tests


8/15/19 16:15








                                Laboratory Tests


8/15/19 16:15











Microbiology


8/15/19 Blood Culture - Preliminary, Resulted


          NO GROWTH AFTER 1 DAY


 (JEMIMA HERNANDEZ DO)





EKG


EKG


[]


 (RE SWIFT)





Radiology/Procedures


Radiology/Procedures


[]PROCEDURE: CT CHEST W/CONTRAST





Exam: CT of chest with contrast


 


INDICATION: Coughing up blood


 


TECHNIQUE: Sequential axial images through the chest obtained following 


the administration of 75 mL of Omni 300 IV contrast. Sagittal and coronal 


reformatted images were reconstructed from the axial data and reviewed.


 


Comparisons: 3/11/2019


 


FINDINGS:


Visualized portions of the thyroid are unremarkable. Calcified lymph nodes


in the right pretracheal and right hilar region are noted. No other 


mediastinal lymph nodes are seen.


 


Heart size is normal. No pericardial effusion. Thoracic aorta has a normal


course and caliber. Pulmonary artery is not enlarged.


 


Narrowing of the airway in the right upper and lower lobes again noted. 


Mild bronchial wall thickening noted in the left lung. This is stable when


compared to the prior exam. As before there is dense consolidation within 


the right apex with cavitary lesion similar when compared to the prior 


study. Adjacent groundglass opacity is also again seen. There is 


redemonstration of a spiculated nodule in the right upper lung series 2 


image 26 which measures 2.5 x 1.6 cm. Tree-in-bud nodularity noted in the 


right middle lobe.


 


No pleural effusion or thickening.


 


Visualized upper abdomen is unremarkable.


 


No suspicious osseous lesion or acute fracture.


 


IMPRESSION:


1.  Stable consolidative changes at the right apex with internal cavity 


and adjacent groundglass opacity and tree-in-bud nodularity. Differential 


considerations include tuberculosis. This is unchanged in appearance when 


compared to the study on 3/11/2019. Correlate with appropriate laboratory 


values


2.  Stable 2.5 x 1.6 cm spiculated nodule in the left upper lung. This may


also be sequela of infection however underlying malignancy is not 


excluded.


 


 


Exposure: One or more of the following in the visualized dose reduction 


techniques were utilized for this examination:


1.  Automated exposure control


2.  Adjustment of the MA and/or KV according to patient size


3.  Use of iterative of reconstructive technique


 


Electronically signed by: Andrew Apodaca MD (8/15/2019 5:21 PM) [facility]














DICTATED and SIGNED BY:     ANDREW APODACA MD


DATE:     08/15/19 1721


 (RE SWIFT)





Course & Med Decision Making


Course & Med Decision Making


Pertinent Labs and Imaging studies reviewed. (See chart for details)





This is a 53-year-old male patient presenting to the ED today to be evaluated 

for hemoptysis that began 3 days ago. Patient has history of TB and was treated.

 Labs are negative for any acute findings. CT of the chest was noted for chronic

 TB.





Consulted with Dr. aviles who accepted patient for admission. We will put a 

consult in for infectious disease.


 (RE SWIFT)





Dragon Disclaimer


Dragon Disclaimer


This electronic medical record was generated, in whole or in part, using a voice

 recognition dictation system.


 (RE SWIFT APRN)





Departure


Departure


Impression:  


   Primary Impression:  


   Chronic tuberculosis


   Additional Impression:  


   Hemoptysis


Disposition:  09 ADMITTED AS INPATIENT


Condition:  STABLE


Referrals:  


NATHANAEL BUTLER (PCP)





Attending Signature


Attending Signature


I have reviewed the PA/NP's note and plan of care. I was available for 

consultation as needed during the patient's visit in the emergency department. I

 agree with the clinical impression, plan, and disposition.


 (JEMIMA HERNANDEZ DO)





Problem Qualifiers











RE SWIFT              Aug 15, 2019 18:59


JEMIMA HERNANDEZ DO             Aug 16, 2019 18:13

## 2019-08-15 NOTE — PDOC1
History and Physical


Date of Admission


Date of Admission


DATE: 8/15/19 


TIME: 20:35





Source


Source:  Chart review, Patient





History of Present Illness


History of Present Illness


 Mr. Amador,  is a 53  year old male has been coughing blood for 2 days, with 

progressive dyspnea and cough.   he has a history of TB and was treated 2 years 

ago, presnted to primary care office and they called an ambulance due to his 

resp distress and hemoptysis


 Patient denies  has some chest pain and appears short  of breath but denies,   

he would like a beer and a smoke.


Moderatly poor historian, but a family member helps him





Past Medical History


Cardiovascular:  HTN


Pulmonary:  COPD, Other


CENTRAL NERVOUS SYSTEM:  Other


GI:  GERD


Heme/Onc:  No pertinent hx


Hepatobiliary:  Other


Psych:  Anxiety


Rheumatologic:  No pertinent hx


Infectious disease:  Other


Renal/:  No pertinent hx


Endocrine:  No pertinent hx





Past Surgical History


Past Surgical History:  No pertinent history





Family History


Family History:  Hypertension





Social History


Smoke:  1 pack per day


ALCOHOL:  heavy


Drugs:  Marijuana





Current Problem List


Problem List


Problems


Medical Problems:


(1) Chronic tuberculosis


Status: Acute  





(2) Hemoptysis


Status: Acute  











Current Medications


Current Medications





Current Medications


Piperacillin Sod/ Tazobactam Sod 4.5 gm/Sodium Chloride 100 ml @  200 mls/hr 1X 

ONCE IV  Last administered on 8/15/19at 16:35;  Start 8/15/19 at 16:30;  Stop 

8/15/19 at 16:59;  Status DC


Vancomycin HCl (Vanco Per Pharmacy) 1 each 1X  ONCE [facility] ;  Start 8/15/19 at 

16:00;  Stop 8/15/19 at 16:06;  Status DC


Vancomycin HCl 1.5 gm/Sodium Chloride 500 ml @  250 mls/hr 1X  ONCE IV  Last 

administered on 8/15/19at 17:13;  Start 8/15/19 at 16:30;  Stop 8/15/19 at 

18:29;  Status DC


Iohexol (Omnipaque 300 Mg/ml) 75 ml 1X  ONCE IV  Last administered on 8/15/19at 

17:06;  Start 8/15/19 at 17:00;  Stop 8/15/19 at 17:01;  Status DC


Info (CONTRAST GIVEN -- Rx MONITORING) 1 each PRN DAILY  PRN MC SEE COMMENTS;  

Start 8/15/19 at 17:00;  Stop 8/17/19 at 16:59


Iohexol (Omnipaque 300 Mg/ml) 100 ml STK-MED ONCE .ROUTE ;  Start 8/15/19 at 

16:56;  Stop 8/15/19 at 16:57;  Status DC


Ondansetron HCl (Zofran) 4 mg PRN Q8HRS  PRN IV NAUSEA/VOMITING;  Start 8/15/19 

at 19:30;  Stop 8/16/19 at 19:29


Acetaminophen (Tylenol) 650 mg PRN Q4HRS  PRN PO FEVER;  Start 8/15/19 at 19:30;

 Stop 8/16/19 at 19:29


Nicotine Polacrilex (Nicorette Gum) 1 each PRN Q1HR  PRN BC SMOKING CESSATION;  

Start 8/15/19 at 20:45


Nicotine (Nicoderm Cq 21mg) 1 patch PRN DAILY  PRN TD SMOKING CESSATION;  Start 

8/15/19 at 20:45;  Status UNV





Active Scripts


Active


Cyclobenzaprine Hcl 10 Mg Tablet 1 Tab PO TID


Diclofenac Sodium 50 Mg Tablet.dr 1 Tab PO BID


Amlodipine Besylate 10 Mg Tablet 10 Mg PO DAILY 30 Days





Allergies


Allergies:  


Coded Allergies:  


     No Known Drug Allergies (Unverified , 9/27/17)





ROS


General:  YES: Chills, Fatigue, Malaise


PSYCHOLOGICAL ROS:  YES: Sleep disturbances; 


   No: Anxiety, Behavioral Disorder, Concentration difficultie, Decreased 

libido, Depression, Disorientation, Hallucinations, Hostility, Irritablity, 

Memory difficulties, Mood Swings, Obsessive thoughts, Other


Eyes:  No Blurry vision, No Decreased vision, No Double vision, No Dry eyes, No 

Excessive tearing, No Eye Pain, No Itchy Eyes, No Loss of vision, No 

Photophobia, No Scotomata, No Uses contacts, No Uses glasses, No Other


HEENT:  No: Heacaches, Visual Changes, Hearing change, Nasal congestion, Nasal 

discharge, Oral lesions, Sinus pain, Sore Throat, Epistaxis, Sneezing, Snoring, 

Tinnitus, Vertigo, Vocal changes, Other


ENDOCRINE:  No: Breast Changes, Galactorrhea, Hair Pattern Changes, Hot Flashes,

Malaise/lethargy, Mood Swings, Palpitations, Polydipsia/polyuria, Skin Changes, 

Temperature Intolerance, Unexpected Weight Changes, Other


Respiratory:  YES: Cough, Hemoptysis, Sputum Changes, Tachypnea; 


   No: Orthopnea, Pleuritic Pain, Shortness of breath, SOB with excertion, 

Stridor, Wheezing, Other


Cardiovascular:  yes Chest Pain; 


   No Palpitations, No Orthopnea, No Paroxysmal Noc. Dyspnea, No Edema, No Lt 

Headedness, No Other


Gastrointestinal:  Yes Nausea; 


   No Vomiting, No Abdominal Pain, No Diarrhea, No Constipation, No Melena, No 

Hematochezia, No Other


Genitourinary:  No Dysuria, No Frequency, No Incontinence, No Hematuria, No 

Retention, No Discharge, No Urgency, No Pain, No Flank Pain, No Other, No , No ,

No , No , No , No , No 


Musculoskeletal:  Yes Joint Pain; 


   No Gait Disturbance, No Joint Stiffness, No Joint Swelling, No Muscle Pain, 

No Muscular Weakness, No Pain In:, No Swelling In:, No Other


Neurological:  No Behavorial Changes, No Bowel/Bladder ControlChng, No 

Confusion, No Dizziness, No Gait Disturbance, No Headaches, No Impaired 

Coord/balance, No Memory Loss, No Numbness/Tingling, No Seizures, No Speech 

Problems, No Tremors, No Visual Changes, No Weakness, No Other


Skin:  No Dry Skin, No Eczema, No Hair Changes, No Lumps, No Mole Changes, No 

Mottling, No Nail Changes, No Pruritus, No Rash, No Skin Lesion Changes, No 

Other, No Acne





Physical Exam


General:  Alert, Oriented X3, Cooperative, mild distress


HEENT:  EOMI, Mucous membr. moist/pink


Lungs:  Other (limited vol,  coughing blood,   end rales, no wheeze)


Heart:  S1S2, RRR, no gallops


Abdomen:  Normal bowel sounds, Soft


Extremities:  No clubbing, No cyanosis, No edema, Normal pulses, Other (very 

thin)


Skin:  No breakdown, No significant lesion


Neuro:  Normal tone, Cranial nerves 3-12 NL


Psych/Mental Status:  Mood NL





Vitals


Vitals





Vital Signs








  Date Time  Temp Pulse Resp B/P (MAP) Pulse Ox O2 Delivery O2 Flow Rate FiO2


 


8/15/19 17:10  56  139/76 (97)  Room Air  


 


8/15/19 16:21     99   


 


8/15/19 14:20 98.1  16     





 98.1       











Labs


Labs





Laboratory Tests








Test


 8/15/19


16:15 8/15/19


16:20


 


White Blood Count


 4.5 x10^3/uL


(4.0-11.0) 





 


Red Blood Count


 4.02 x10^6/uL


(4.30-5.70) 





 


Hemoglobin


 13.6 g/dL


(13.0-17.5) 





 


Hematocrit


 39.3 %


(39.0-53.0) 





 


Mean Corpuscular Volume 98 fL ()  


 


Mean Corpuscular Hemoglobin 34 pg (25-35)  


 


Mean Corpuscular Hemoglobin


Concent 35 g/dL


(31-37) 





 


Red Cell Distribution Width


 13.7 %


(11.5-14.5) 





 


Platelet Count


 101 x10^3/uL


(140-400) 





 


Neutrophils (%) (Auto) 65 % (31-73)  


 


Lymphocytes (%) (Auto) 20 % (24-48)  


 


Monocytes (%) (Auto) 13 % (0-9)  


 


Eosinophils (%) (Auto) 1 % (0-3)  


 


Basophils (%) (Auto) 1 % (0-3)  


 


Neutrophils # (Auto)


 2.9 x10^3/uL


(1.8-7.7) 





 


Lymphocytes # (Auto)


 0.9 x10^3/uL


(1.0-4.8) 





 


Monocytes # (Auto)


 0.6 x10^3/uL


(0.0-1.1) 





 


Eosinophils # (Auto)


 0.0 x10^3/uL


(0.0-0.7) 





 


Basophils # (Auto)


 0.0 x10^3/uL


(0.0-0.2) 





 


Sodium Level


 137 mmol/L


(136-145) 





 


Potassium Level


 3.9 mmol/L


(3.5-5.1) 





 


Chloride Level


 99 mmol/L


() 





 


Carbon Dioxide Level


 25 mmol/L


(21-32) 





 


Anion Gap 13 (6-14)  


 


Blood Urea Nitrogen 6 mg/dL (8-26)  


 


Creatinine


 0.6 mg/dL


(0.7-1.3) 





 


Estimated GFR


(Cockcroft-Gault) 169.3 


 





 


BUN/Creatinine Ratio 10 (6-20)  


 


Glucose Level


 72 mg/dL


(70-99) 





 


Lactic Acid Level


 1.6 mmol/L


(0.4-2.0) 





 


Calcium Level


 9.3 mg/dL


(8.5-10.1) 





 


Magnesium Level


 1.7 mg/dL


(1.8-2.4) 





 


Total Bilirubin


 0.7 mg/dL


(0.2-1.0) 





 


Aspartate Amino Transf


(AST/SGOT) 132 U/L


(15-37) 





 


Alanine Aminotransferase


(ALT/SGPT) 95 U/L (16-63) 


 





 


Alkaline Phosphatase


 87 U/L


() 





 


Creatine Kinase


 315 U/L


() 





 


Creatine Kinase MB (Mass)


 2.1 ng/mL


(0.0-3.6) 





 


Creatine Kinase MB Relative


Index 0.7 % (0-4) 


 





 


Troponin I Quantitative


 < 0.017 ng/mL


(0.000-0.055) 





 


NT-Pro-B-Type Natriuretic


Peptide 51 pg/mL


(0-124) 





 


Total Protein


 8.7 g/dL


(6.4-8.2) 





 


Albumin


 3.7 g/dL


(3.4-5.0) 





 


Albumin/Globulin Ratio 0.7 (1.0-1.7)  


 


Lipase


 98 U/L


() 





 


Urine Collection Type  Unknown 


 


Urine Color  Yellow 


 


Urine Clarity  Clear 


 


Urine pH  5.5 


 


Urine Specific Gravity  <=1.005 


 


Urine Protein


 


 Negative mg/dL


(NEG-TRACE)


 


Urine Glucose (UA)


 


 Negative mg/dL


(NEG)


 


Urine Ketones (Stick)


 


 Negative mg/dL


(NEG)


 


Urine Blood  Negative (NEG) 


 


Urine Nitrite  Negative (NEG) 


 


Urine Bilirubin  Negative (NEG) 


 


Urine Urobilinogen Dipstick


 


 0.2 mg/dL (0.2


mg/dL)


 


Urine Leukocyte Esterase  Negative (NEG) 


 


Urine RBC  0 /HPF (0-2) 


 


Urine WBC  0 /HPF (0-4) 


 


Urine Bacteria  0 /HPF (0-FEW) 


 


Urine Opiates Screen  Neg (NEG) 


 


Urine Methadone Screen  Neg (NEG) 


 


Urine Barbiturates  Neg (NEG) 


 


Urine Phencyclidine Screen  Neg (NEG) 


 


Urine


Amphetamine/Methamphetamine 


 Neg (NEG) 





 


Urine Benzodiazepines Screen  Neg (NEG) 


 


Urine Cocaine Screen  Neg (NEG) 


 


Urine Cannabinoids Screen  Neg (NEG) 


 


Urine Ethyl Alcohol  Pos (NEG) 








Laboratory Tests








Test


 8/15/19


16:15 8/15/19


16:20


 


White Blood Count


 4.5 x10^3/uL


(4.0-11.0) 





 


Red Blood Count


 4.02 x10^6/uL


(4.30-5.70) 





 


Hemoglobin


 13.6 g/dL


(13.0-17.5) 





 


Hematocrit


 39.3 %


(39.0-53.0) 





 


Mean Corpuscular Volume 98 fL ()  


 


Mean Corpuscular Hemoglobin 34 pg (25-35)  


 


Mean Corpuscular Hemoglobin


Concent 35 g/dL


(31-37) 





 


Red Cell Distribution Width


 13.7 %


(11.5-14.5) 





 


Platelet Count


 101 x10^3/uL


(140-400) 





 


Neutrophils (%) (Auto) 65 % (31-73)  


 


Lymphocytes (%) (Auto) 20 % (24-48)  


 


Monocytes (%) (Auto) 13 % (0-9)  


 


Eosinophils (%) (Auto) 1 % (0-3)  


 


Basophils (%) (Auto) 1 % (0-3)  


 


Neutrophils # (Auto)


 2.9 x10^3/uL


(1.8-7.7) 





 


Lymphocytes # (Auto)


 0.9 x10^3/uL


(1.0-4.8) 





 


Monocytes # (Auto)


 0.6 x10^3/uL


(0.0-1.1) 





 


Eosinophils # (Auto)


 0.0 x10^3/uL


(0.0-0.7) 





 


Basophils # (Auto)


 0.0 x10^3/uL


(0.0-0.2) 





 


Sodium Level


 137 mmol/L


(136-145) 





 


Potassium Level


 3.9 mmol/L


(3.5-5.1) 





 


Chloride Level


 99 mmol/L


() 





 


Carbon Dioxide Level


 25 mmol/L


(21-32) 





 


Anion Gap 13 (6-14)  


 


Blood Urea Nitrogen 6 mg/dL (8-26)  


 


Creatinine


 0.6 mg/dL


(0.7-1.3) 





 


Estimated GFR


(Cockcroft-Gault) 169.3 


 





 


BUN/Creatinine Ratio 10 (6-20)  


 


Glucose Level


 72 mg/dL


(70-99) 





 


Lactic Acid Level


 1.6 mmol/L


(0.4-2.0) 





 


Calcium Level


 9.3 mg/dL


(8.5-10.1) 





 


Magnesium Level


 1.7 mg/dL


(1.8-2.4) 





 


Total Bilirubin


 0.7 mg/dL


(0.2-1.0) 





 


Aspartate Amino Transf


(AST/SGOT) 132 U/L


(15-37) 





 


Alanine Aminotransferase


(ALT/SGPT) 95 U/L (16-63) 


 





 


Alkaline Phosphatase


 87 U/L


() 





 


Creatine Kinase


 315 U/L


() 





 


Creatine Kinase MB (Mass)


 2.1 ng/mL


(0.0-3.6) 





 


Creatine Kinase MB Relative


Index 0.7 % (0-4) 


 





 


Troponin I Quantitative


 < 0.017 ng/mL


(0.000-0.055) 





 


NT-Pro-B-Type Natriuretic


Peptide 51 pg/mL


(0-124) 





 


Total Protein


 8.7 g/dL


(6.4-8.2) 





 


Albumin


 3.7 g/dL


(3.4-5.0) 





 


Albumin/Globulin Ratio 0.7 (1.0-1.7)  


 


Lipase


 98 U/L


() 





 


Urine Collection Type  Unknown 


 


Urine Color  Yellow 


 


Urine Clarity  Clear 


 


Urine pH  5.5 


 


Urine Specific Gravity  <=1.005 


 


Urine Protein


 


 Negative mg/dL


(NEG-TRACE)


 


Urine Glucose (UA)


 


 Negative mg/dL


(NEG)


 


Urine Ketones (Stick)


 


 Negative mg/dL


(NEG)


 


Urine Blood  Negative (NEG) 


 


Urine Nitrite  Negative (NEG) 


 


Urine Bilirubin  Negative (NEG) 


 


Urine Urobilinogen Dipstick


 


 0.2 mg/dL (0.2


mg/dL)


 


Urine Leukocyte Esterase  Negative (NEG) 


 


Urine RBC  0 /HPF (0-2) 


 


Urine WBC  0 /HPF (0-4) 


 


Urine Bacteria  0 /HPF (0-FEW) 


 


Urine Opiates Screen  Neg (NEG) 


 


Urine Methadone Screen  Neg (NEG) 


 


Urine Barbiturates  Neg (NEG) 


 


Urine Phencyclidine Screen  Neg (NEG) 


 


Urine


Amphetamine/Methamphetamine 


 Neg (NEG) 





 


Urine Benzodiazepines Screen  Neg (NEG) 


 


Urine Cocaine Screen  Neg (NEG) 


 


Urine Cannabinoids Screen  Neg (NEG) 


 


Urine Ethyl Alcohol  Pos (NEG) 











VTE Prophylaxis Ordered


VTE Prophylaxis Devices:  Contraindicated


VTE Pharmacological Prophylaxi:  Yes





Assessment/Plan


Assessment/Plan


pneumonia


sepsis


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 

regimen ordered





admit


tobacco use disorder


Alcohol abuse


weight loss, malnutrition











VERONICA PIRES MD                 Aug 15, 2019 20:39

## 2019-08-16 VITALS — DIASTOLIC BLOOD PRESSURE: 87 MMHG | SYSTOLIC BLOOD PRESSURE: 150 MMHG

## 2019-08-16 VITALS — SYSTOLIC BLOOD PRESSURE: 147 MMHG | DIASTOLIC BLOOD PRESSURE: 95 MMHG

## 2019-08-16 VITALS — DIASTOLIC BLOOD PRESSURE: 92 MMHG | SYSTOLIC BLOOD PRESSURE: 146 MMHG

## 2019-08-16 LAB
ANION GAP SERPL CALC-SCNC: 9 MMOL/L (ref 6–14)
BASOPHILS # BLD AUTO: 0 X10^3/UL (ref 0–0.2)
BASOPHILS NFR BLD: 1 % (ref 0–3)
BUN SERPL-MCNC: 11 MG/DL (ref 8–26)
CALCIUM SERPL-MCNC: 9.2 MG/DL (ref 8.5–10.1)
CHLORIDE SERPL-SCNC: 99 MMOL/L (ref 98–107)
CO2 SERPL-SCNC: 29 MMOL/L (ref 21–32)
CREAT SERPL-MCNC: 0.8 MG/DL (ref 0.7–1.3)
EOSINOPHIL NFR BLD: 0.1 X10^3/UL (ref 0–0.7)
EOSINOPHIL NFR BLD: 1 % (ref 0–3)
ERYTHROCYTE [DISTWIDTH] IN BLOOD BY AUTOMATED COUNT: 13.9 % (ref 11.5–14.5)
GFR SERPLBLD BASED ON 1.73 SQ M-ARVRAT: 121.4 ML/MIN
GLUCOSE SERPL-MCNC: 97 MG/DL (ref 70–99)
HCT VFR BLD CALC: 37 % (ref 39–53)
HGB BLD-MCNC: 12.8 G/DL (ref 13–17.5)
LYMPHOCYTES # BLD: 0.6 X10^3/UL (ref 1–4.8)
LYMPHOCYTES NFR BLD AUTO: 12 % (ref 24–48)
MCH RBC QN AUTO: 34 PG (ref 25–35)
MCHC RBC AUTO-ENTMCNC: 35 G/DL (ref 31–37)
MCV RBC AUTO: 98 FL (ref 79–100)
MONO #: 0.8 X10^3/UL (ref 0–1.1)
MONOCYTES NFR BLD: 16 % (ref 0–9)
NEUT #: 3.5 X10^3/UL (ref 1.8–7.7)
NEUTROPHILS NFR BLD AUTO: 71 % (ref 31–73)
PLATELET # BLD AUTO: 92 X10^3/UL (ref 140–400)
POTASSIUM SERPL-SCNC: 3.9 MMOL/L (ref 3.5–5.1)
RBC # BLD AUTO: 3.78 X10^6/UL (ref 4.3–5.7)
SODIUM SERPL-SCNC: 137 MMOL/L (ref 136–145)
WBC # BLD AUTO: 5 X10^3/UL (ref 4–11)

## 2019-08-16 RX ADMIN — DICLOFENAC SODIUM SCH MG: 25 TABLET, DELAYED RELEASE ORAL at 09:22

## 2019-08-16 RX ADMIN — CYCLOBENZAPRINE HYDROCHLORIDE SCH MG: 10 TABLET, FILM COATED ORAL at 14:00

## 2019-08-16 RX ADMIN — CYCLOBENZAPRINE HYDROCHLORIDE SCH MG: 10 TABLET, FILM COATED ORAL at 09:21

## 2019-08-16 RX ADMIN — PYRAZINAMIDE SCH MG: 500 TABLET ORAL at 09:22

## 2019-08-16 RX ADMIN — ISONIAZID SCH MG: 300 TABLET ORAL at 09:22

## 2019-08-16 RX ADMIN — ETHAMBUTOL HYDROCHLORIDE SCH MG: 400 TABLET ORAL at 09:22

## 2019-08-16 RX ADMIN — RIFAMPIN SCH MLS/HR: 600 INJECTION, POWDER, LYOPHILIZED, FOR SOLUTION INTRAVENOUS at 09:23

## 2019-08-16 NOTE — PDOC
[facility] PROGRESS NOTE


Chief Complaint


Chief Complaint


Possible TB exposure


Acute SOB


Hemoptysis


COPD


HTN





History of Present Illness


History of Present Illness


08/16/19


Pt seen and examined


Chart reviewed


DW Dr. Schaefer (ID)


ИВАН RN





Vitals/I&O


Vitals/I&O:





                                   Vital Signs








  Date Time  Temp Pulse Resp B/P (MAP) Pulse Ox O2 Delivery O2 Flow Rate FiO2


 


8/16/19 11:00 97.5 72 16 147/95 (112) 98 Room Air  





 97.5       














                                    I & O   


 


 8/15/19 8/15/19 8/16/19





 15:00 23:00 07:00


 


Intake Total  600 ml 340 ml


 


Output Total   200 ml


 


Balance  600 ml 140 ml











Physical Exam


General:  Alert, Oriented X3, Cooperative, mild distress


Heart:  Regular rate, No murmurs


Lungs:  Crackles


Abdomen:  Normal bowel sounds, Soft


Extremities:  No clubbing, No cyanosis, No edema, Normal pulses, Other (very 

thin)


Skin:  No breakdown, No significant lesion





Labs


Labs:





Laboratory Tests








Test


 8/15/19


16:15 8/15/19


16:20 8/16/19


04:00


 


White Blood Count


 4.5 x10^3/uL


(4.0-11.0) 


 5.0 x10^3/uL


(4.0-11.0)


 


Red Blood Count


 4.02 x10^6/uL


(4.30-5.70) 


 3.78 x10^6/uL


(4.30-5.70)


 


Hemoglobin


 13.6 g/dL


(13.0-17.5) 


 12.8 g/dL


(13.0-17.5)


 


Hematocrit


 39.3 %


(39.0-53.0) 


 37.0 %


(39.0-53.0)


 


Mean Corpuscular Volume 98 fL ()   98 fL () 


 


Mean Corpuscular Hemoglobin 34 pg (25-35)   34 pg (25-35) 


 


Mean Corpuscular Hemoglobin


Concent 35 g/dL


(31-37) 


 35 g/dL


(31-37)


 


Red Cell Distribution Width


 13.7 %


(11.5-14.5) 


 13.9 %


(11.5-14.5)


 


Platelet Count


 101 x10^3/uL


(140-400) 


 92 x10^3/uL


(140-400)


 


Neutrophils (%) (Auto) 65 % (31-73)   71 % (31-73) 


 


Lymphocytes (%) (Auto) 20 % (24-48)   12 % (24-48) 


 


Monocytes (%) (Auto) 13 % (0-9)   16 % (0-9) 


 


Eosinophils (%) (Auto) 1 % (0-3)   1 % (0-3) 


 


Basophils (%) (Auto) 1 % (0-3)   1 % (0-3) 


 


Neutrophils # (Auto)


 2.9 x10^3/uL


(1.8-7.7) 


 3.5 x10^3/uL


(1.8-7.7)


 


Lymphocytes # (Auto)


 0.9 x10^3/uL


(1.0-4.8) 


 0.6 x10^3/uL


(1.0-4.8)


 


Monocytes # (Auto)


 0.6 x10^3/uL


(0.0-1.1) 


 0.8 x10^3/uL


(0.0-1.1)


 


Eosinophils # (Auto)


 0.0 x10^3/uL


(0.0-0.7) 


 0.1 x10^3/uL


(0.0-0.7)


 


Basophils # (Auto)


 0.0 x10^3/uL


(0.0-0.2) 


 0.0 x10^3/uL


(0.0-0.2)


 


Sodium Level


 137 mmol/L


(136-145) 


 137 mmol/L


(136-145)


 


Potassium Level


 3.9 mmol/L


(3.5-5.1) 


 3.9 mmol/L


(3.5-5.1)


 


Chloride Level


 99 mmol/L


() 


 99 mmol/L


()


 


Carbon Dioxide Level


 25 mmol/L


(21-32) 


 29 mmol/L


(21-32)


 


Anion Gap 13 (6-14)   9 (6-14) 


 


Blood Urea Nitrogen


 6 mg/dL (8-26) 


 


 11 mg/dL


(8-26)


 


Creatinine


 0.6 mg/dL


(0.7-1.3) 


 0.8 mg/dL


(0.7-1.3)


 


Estimated GFR


(Cockcroft-Gault) 169.3 


 


 121.4 





 


BUN/Creatinine Ratio 10 (6-20)   


 


Glucose Level


 72 mg/dL


(70-99) 


 97 mg/dL


(70-99)


 


Lactic Acid Level


 1.6 mmol/L


(0.4-2.0) 


 2.0 mmol/L


(0.4-2.0)


 


Calcium Level


 9.3 mg/dL


(8.5-10.1) 


 9.2 mg/dL


(8.5-10.1)


 


Magnesium Level


 1.7 mg/dL


(1.8-2.4) 


 





 


Total Bilirubin


 0.7 mg/dL


(0.2-1.0) 


 





 


Aspartate Amino Transf


(AST/SGOT) 132 U/L


(15-37) 


 





 


Alanine Aminotransferase


(ALT/SGPT) 95 U/L (16-63) 


 


 





 


Alkaline Phosphatase


 87 U/L


() 


 





 


Creatine Kinase


 315 U/L


() 


 





 


Creatine Kinase MB (Mass)


 2.1 ng/mL


(0.0-3.6) 


 





 


Creatine Kinase MB Relative


Index 0.7 % (0-4) 


 


 





 


Troponin I Quantitative


 < 0.017 ng/mL


(0.000-0.055) 


 





 


NT-Pro-B-Type Natriuretic


Peptide 51 pg/mL


(0-124) 


 





 


Total Protein


 8.7 g/dL


(6.4-8.2) 


 





 


Albumin


 3.7 g/dL


(3.4-5.0) 


 





 


Albumin/Globulin Ratio 0.7 (1.0-1.7)   


 


Lipase


 98 U/L


() 


 





 


Urine Collection Type  Unknown  


 


Urine Color  Yellow  


 


Urine Clarity  Clear  


 


Urine pH  5.5  


 


Urine Specific Gravity  <=1.005  


 


Urine Protein


 


 Negative mg/dL


(NEG-TRACE) 





 


Urine Glucose (UA)


 


 Negative mg/dL


(NEG) 





 


Urine Ketones (Stick)


 


 Negative mg/dL


(NEG) 





 


Urine Blood  Negative (NEG)  


 


Urine Nitrite  Negative (NEG)  


 


Urine Bilirubin  Negative (NEG)  


 


Urine Urobilinogen Dipstick


 


 0.2 mg/dL (0.2


mg/dL) 





 


Urine Leukocyte Esterase  Negative (NEG)  


 


Urine RBC  0 /HPF (0-2)  


 


Urine WBC  0 /HPF (0-4)  


 


Urine Bacteria  0 /HPF (0-FEW)  


 


Urine Opiates Screen  Neg (NEG)  


 


Urine Methadone Screen  Neg (NEG)  


 


Urine Barbiturates  Neg (NEG)  


 


Urine Phencyclidine Screen  Neg (NEG)  


 


Urine


Amphetamine/Methamphetamine 


 Neg (NEG) 


 





 


Urine Benzodiazepines Screen  Neg (NEG)  


 


Urine Cocaine Screen  Neg (NEG)  


 


Urine Cannabinoids Screen  Neg (NEG)  


 


Urine Ethyl Alcohol  Pos (NEG)  











Review of Systems


Review of Systems:


No headache


No chest pain


No fever/chills





Assessment and Plan


Assessmemt and Plan


Problems


Medical Problems:


(1) Chronic tuberculosis


Status: Acute  





(2) Hemoptysis


Status: Acute





Assessment:


Possible TB exposure


Acute SOB


Hemoptysis


COPD


HTN





Plan:


Hopeful D/C later today as DW ID (Dr. Schaefer)


Awaiting sputum culture in outpatient setting


DVT prophylaxis


PT/OT


Full code


Home meds





Comment


Review of Relevant


I have reviewed the following items elizabeth (where applicable) has been applied.


Medications:





Current Medications








 Medications


  (Trade)  Dose


 Ordered  Sig/Yg


 Route


 PRN Reason  Start Time


 Stop Time Status Last Admin


Dose Admin


 


 Piperacillin Sod/


 Tazobactam Sod


 4.5 gm/Sodium


 Chloride  100 ml @ 


 200 mls/hr  1X  ONCE


 IV


   8/15/19 16:30


 8/16/19 11:39 DC 8/15/19 16:35





 


 Vancomycin HCl


  (Vanco Per


 Pharmacy)  1 each  1X  ONCE


 [facility]


   8/15/19 16:00


 8/16/19 11:39 DC 8/15/19 22:19





 


 Vancomycin HCl


 1.5 gm/Sodium


 Chloride  500 ml @ 


 250 mls/hr  1X  ONCE


 IV


   8/15/19 16:30


 8/16/19 11:39 DC 8/15/19 17:13





 


 Iohexol


  (Omnipaque 300


 Mg/ml)  75 ml  1X  ONCE


 IV


   8/15/19 17:00


 8/15/19 17:01 DC 8/15/19 17:06





 


 Acetaminophen


  (Tylenol)  650 mg  PRN Q4HRS  PRN


 PO


 FEVER  8/15/19 19:30


 8/16/19 19:29  8/16/19 04:52





 


 Nicotine


  (Nicoderm Cq


 21mg)  1 patch  PRN DAILY  PRN


 TD


 SMOKING CESSATION  8/15/19 20:45


    8/15/19 23:37





 


 Rifampin 300 mg/


 Sodium Chloride  100 ml @ 


 100 mls/hr  Q12HR


 IV


   8/15/19 21:00


 8/16/19 11:39 DC 8/16/19 09:24





 


 Isoniazid


  (Nydrazid)  300 mg  DAILYAC


 PO


   8/15/19 21:00


 8/16/19 11:39 DC 8/16/19 09:24





 


 Ethambutol HCl


  (Myambutol)  400 mg  DAILY


 PO


   8/15/19 21:00


 8/16/19 11:39 DC 8/16/19 09:24





 


 Pyrazinamide


  (Tabrazid)  500 mg  DAILY


 PO


   8/15/19 21:00


 8/16/19 11:39 DC 8/16/19 09:24





 


 Cyclobenzaprine


 HCl


  (Flexeril)  10 mg  TID


 PO


   8/15/19 21:00


    8/16/19 09:24





 


 Diclofenac Sodium


  (Voltaren)  50 mg  BID


 PO


   8/15/19 21:00


    8/16/19 09:24




















CELSO MCMAHAN III DO           Aug 16, 2019 12:28

## 2019-08-16 NOTE — NUR
Discharge Note: PT DISCHARGED HOME WITH SELF CARE. PT LEFT FACILITY VIA PRIVATE VEHICLE WITH 
WIFE AT [id number]. PT STABLE AND ALERT UPON DISCHARGE. PT PIV REMOVED FROM R FA WITHOUT 
COMPLICATIONS, BANDAGE APPLIED. PT EDUCATED ABOUT DISCHARGE MEDICATIONS, DISCHARGE 
INSTRUCTIONS, AND FOLLOW-UP INSTRUCTIONS. PT VOICED NO CONCERNS AT THIS TIME. PT LEFT WITH 
ALL BELONGINGS. 



CHRISTOPHER HUBBARD



Discharge instructions and discharge home medications reviewed with Patient and a copy 
given. All questions have been answered and understanding verbalized.

## 2019-08-16 NOTE — CONS
DATE OF CONSULTATION:  08/16/2019



REQUESTING PHYSICIAN:  Dr. Laurent.



REASON FOR CONSULTATION:  Possible TB.



HISTORY OF PRESENT ILLNESS:  This is a 53-year-old -American gentleman

who came in with coughing up blood for last 2 days.  The patient denies any

night sweats.  Denies any fever.  He has lost some weight 5 pounds, but it is

not recent, he says.



The patient has chronic changes on the CT.  Hence, he is admitted to rule out

TB.  The patient has had tuberculosis treated about 10 years ago.  His last

workup here in the hospital in 2017 including bronchoscopy was negative for

tuberculosis.  His last CT that he has had in March of this year, compared to

yesterday, is unchanged.  The patient denies any nausea or vomiting.  Denies any

headache or visual symptoms.  Denies any abdominal pain, urinary symptoms or

bowel symptoms.



PAST MEDICAL HISTORY:  Positive for history of tuberculosis treated 10 years or

so ago.  The patient has hypertension, substance abuse and smoking.  The patient

continues to use off and on cocaine and also marijuana also continues to drink

heavily and smoke.



SOCIAL HISTORY:  As I mentioned, positive for smoking, positive for alcohol use

as well as drug use.



CURRENT MEDICATIONS:  Reviewed.  The patient is put on rifampin, vancomycin,

Zosyn, isoniazid, ethambutol, and pyrazinamide, I do not know why.





ROS : as per HPI, rest neg.



PHYSICAL EXAMINATION:

GENERAL:  Alert and oriented gentleman, not in distress.

VITAL SIGNS:  Stable, afebrile.

HEENT:  NAD.

NECK:  Supple, no JVP, no lymphadenopathy.  He does have small shotty lymph

nodes in the neck.

LUNGS:  Clear.

HEART:  S1, S2 regular.

ABDOMEN:  Benign.

EXTREMITIES:  No edema, cyanosis.

SKIN:  Unremarkable.

NEUROLOGICAL:  The patient is neurologically intact.  He is neurologically

alert, awake and appropriate.  No focal neurologic deficit.



LABORATORY DATA:  White count is 5000, hemoglobin 12.8, platelets are 92,000. 

BUN and creatinine is normal.  Urinalysis is unremarkable.



Chest CT showed stable consolidative changes in the right apex with internal

cavity and adjacent ground glass opacity and tree-in-bud nodularity. 

Differential concerns include tuberculosis, it is unchanged in appearance when

compared to study of March of this year, stable 2.5 x 1.6 cm spiculated nodule

in the left upper lung.



IMPRESSION:

1.  A day or two of hemoptysis.

2.  History of tuberculosis treated 10 years ago.

3.  Chronic changes on the chest with cavitation, last workup for TB including

bronchoscopy and culture for  TB were negative in 2017.  Not much has

changed in his CT compared to that. In fact it has improved some.

4.  Substance abuse.

5.  Alcoholism.

6.  Smoking.



RECOMMENDATIONS:  The patient is going to need  3 sputums to rule out

TB.  His reactivation of tuberculosis probability is very low since he was

[place], most likely this is all scar tissue.  Obviously,

he is high risk for anything because of the drug and alcohol use including

malignancy and he may need workup for that.  The patient does not want, patient

wants to get out today.  He is willing to do sputum today and two more to submit

daily, but clearly explained to the patient about the possibilities and may need

further workup.  Also advised to quit alcohol, quit smoking as well as quit

drugs.



Thank you very much, Dr. Laurent, for giving me the opportunity to participate in

this patient's care.

 



______________________________

HORTENCIA CLAYTON MD



DR:  ELANA/adrianna  JOB#:  [id number] / [id number]

DD:  08/16/2019 11:38  DT:  08/16/2019 20:54

GRAY

## 2019-08-16 NOTE — PDOC
Infectious Disease Note


Vital Sign


Vital Signs





Vital Signs








  Date Time  Temp Pulse Resp B/P (MAP) Pulse Ox O2 Delivery O2 Flow Rate FiO2


 


8/16/19 03:30 98.2 69 18 146/92 (110) 98 Room Air  





 98.2       











Labs


Lab





Laboratory Tests








Test


 8/15/19


16:15 8/15/19


16:20 8/16/19


04:00


 


White Blood Count


 4.5 x10^3/uL


(4.0-11.0) 


 5.0 x10^3/uL


(4.0-11.0)


 


Red Blood Count


 4.02 x10^6/uL


(4.30-5.70) 


 3.78 x10^6/uL


(4.30-5.70)


 


Hemoglobin


 13.6 g/dL


(13.0-17.5) 


 12.8 g/dL


(13.0-17.5)


 


Hematocrit


 39.3 %


(39.0-53.0) 


 37.0 %


(39.0-53.0)


 


Mean Corpuscular Volume 98 fL ()   98 fL () 


 


Mean Corpuscular Hemoglobin 34 pg (25-35)   34 pg (25-35) 


 


Mean Corpuscular Hemoglobin


Concent 35 g/dL


(31-37) 


 35 g/dL


(31-37)


 


Red Cell Distribution Width


 13.7 %


(11.5-14.5) 


 13.9 %


(11.5-14.5)


 


Platelet Count


 101 x10^3/uL


(140-400) 


 92 x10^3/uL


(140-400)


 


Neutrophils (%) (Auto) 65 % (31-73)   71 % (31-73) 


 


Lymphocytes (%) (Auto) 20 % (24-48)   12 % (24-48) 


 


Monocytes (%) (Auto) 13 % (0-9)   16 % (0-9) 


 


Eosinophils (%) (Auto) 1 % (0-3)   1 % (0-3) 


 


Basophils (%) (Auto) 1 % (0-3)   1 % (0-3) 


 


Neutrophils # (Auto)


 2.9 x10^3/uL


(1.8-7.7) 


 3.5 x10^3/uL


(1.8-7.7)


 


Lymphocytes # (Auto)


 0.9 x10^3/uL


(1.0-4.8) 


 0.6 x10^3/uL


(1.0-4.8)


 


Monocytes # (Auto)


 0.6 x10^3/uL


(0.0-1.1) 


 0.8 x10^3/uL


(0.0-1.1)


 


Eosinophils # (Auto)


 0.0 x10^3/uL


(0.0-0.7) 


 0.1 x10^3/uL


(0.0-0.7)


 


Basophils # (Auto)


 0.0 x10^3/uL


(0.0-0.2) 


 0.0 x10^3/uL


(0.0-0.2)


 


Sodium Level


 137 mmol/L


(136-145) 


 137 mmol/L


(136-145)


 


Potassium Level


 3.9 mmol/L


(3.5-5.1) 


 3.9 mmol/L


(3.5-5.1)


 


Chloride Level


 99 mmol/L


() 


 99 mmol/L


()


 


Carbon Dioxide Level


 25 mmol/L


(21-32) 


 29 mmol/L


(21-32)


 


Anion Gap 13 (6-14)   9 (6-14) 


 


Blood Urea Nitrogen


 6 mg/dL (8-26) 


 


 11 mg/dL


(8-26)


 


Creatinine


 0.6 mg/dL


(0.7-1.3) 


 0.8 mg/dL


(0.7-1.3)


 


Estimated GFR


(Cockcroft-Gault) 169.3 


 


 121.4 





 


BUN/Creatinine Ratio 10 (6-20)   


 


Glucose Level


 72 mg/dL


(70-99) 


 97 mg/dL


(70-99)


 


Lactic Acid Level


 1.6 mmol/L


(0.4-2.0) 


 2.0 mmol/L


(0.4-2.0)


 


Calcium Level


 9.3 mg/dL


(8.5-10.1) 


 9.2 mg/dL


(8.5-10.1)


 


Magnesium Level


 1.7 mg/dL


(1.8-2.4) 


 





 


Total Bilirubin


 0.7 mg/dL


(0.2-1.0) 


 





 


Aspartate Amino Transf


(AST/SGOT) 132 U/L


(15-37) 


 





 


Alanine Aminotransferase


(ALT/SGPT) 95 U/L (16-63) 


 


 





 


Alkaline Phosphatase


 87 U/L


() 


 





 


Creatine Kinase


 315 U/L


() 


 





 


Creatine Kinase MB (Mass)


 2.1 ng/mL


(0.0-3.6) 


 





 


Creatine Kinase MB Relative


Index 0.7 % (0-4) 


 


 





 


Troponin I Quantitative


 < 0.017 ng/mL


(0.000-0.055) 


 





 


NT-Pro-B-Type Natriuretic


Peptide 51 pg/mL


(0-124) 


 





 


Total Protein


 8.7 g/dL


(6.4-8.2) 


 





 


Albumin


 3.7 g/dL


(3.4-5.0) 


 





 


Albumin/Globulin Ratio 0.7 (1.0-1.7)   


 


Lipase


 98 U/L


() 


 





 


Urine Collection Type  Unknown  


 


Urine Color  Yellow  


 


Urine Clarity  Clear  


 


Urine pH  5.5  


 


Urine Specific Gravity  <=1.005  


 


Urine Protein


 


 Negative mg/dL


(NEG-TRACE) 





 


Urine Glucose (UA)


 


 Negative mg/dL


(NEG) 





 


Urine Ketones (Stick)


 


 Negative mg/dL


(NEG) 





 


Urine Blood  Negative (NEG)  


 


Urine Nitrite  Negative (NEG)  


 


Urine Bilirubin  Negative (NEG)  


 


Urine Urobilinogen Dipstick


 


 0.2 mg/dL (0.2


mg/dL) 





 


Urine Leukocyte Esterase  Negative (NEG)  


 


Urine RBC  0 /HPF (0-2)  


 


Urine WBC  0 /HPF (0-4)  


 


Urine Bacteria  0 /HPF (0-FEW)  


 


Urine Opiates Screen  Neg (NEG)  


 


Urine Methadone Screen  Neg (NEG)  


 


Urine Barbiturates  Neg (NEG)  


 


Urine Phencyclidine Screen  Neg (NEG)  


 


Urine


Amphetamine/Methamphetamine 


 Neg (NEG) 


 





 


Urine Benzodiazepines Screen  Neg (NEG)  


 


Urine Cocaine Screen  Neg (NEG)  


 


Urine Cannabinoids Screen  Neg (NEG)  


 


Urine Ethyl Alcohol  Pos (NEG)  











Objective


Assessment


pt seen, consult dictated





Plan


Plan of Care


//











HORTENCIA CLAYTON MD               Aug 16, 2019 11:30

## 2019-08-16 NOTE — NUR
SW following pt for dc needs. Chart reviewed and discussed with RN. Pt lives at home and ID 
consulted. Will continue to follow pending dc needs.

## 2019-08-16 NOTE — DS
DATE OF DISCHARGE:  08/16/2019



ADMISSION DIAGNOSIS:  Right apical consolidation, suspicious for tuberculosis.



DISCHARGE DIAGNOSES:  Atypical presentation of tuberculosis and also drug abuse

and alcohol abuse.



CONSULTS:  Dr. Schaefer, Infectious Disease.



HOSPITAL COURSE:  The patient is a pleasant 53-year-old male who presented with

possible TB exposure.  He had a right apical consolidation.  We did admit him to

TB isolation for a day or two.  This morning, I saw him, I talked to Dr. Schaefer,

and Dr. Schaefer thinks this is probably not TB.  The patient wants to go home.  We

plan to discharge with close outpatient followup.



DISPOSITION:  Home.



ACTIVITY:  As tolerated.



DIET:  Low sodium.



MEDICATIONS:  Please see the MRAD.



TOTAL TIME:  34 minutes.

 



______________________________

CELSO MCMAHAN DO



DR:  ZULEIMA/adrianna  JOB#:  [id number] / [id number]

DD:  08/16/2019 12:52  DT:  08/16/2019 13:22

## 2021-06-08 ENCOUNTER — HOSPITAL ENCOUNTER (INPATIENT)
Dept: HOSPITAL 61 - ER | Age: 56
LOS: 13 days | Discharge: HOME HEALTH | DRG: 4 | End: 2021-06-21
Attending: STUDENT IN AN ORGANIZED HEALTH CARE EDUCATION/TRAINING PROGRAM | Admitting: STUDENT IN AN ORGANIZED HEALTH CARE EDUCATION/TRAINING PROGRAM
Payer: MEDICARE

## 2021-06-08 VITALS — DIASTOLIC BLOOD PRESSURE: 88 MMHG | SYSTOLIC BLOOD PRESSURE: 111 MMHG

## 2021-06-08 VITALS — DIASTOLIC BLOOD PRESSURE: 71 MMHG | SYSTOLIC BLOOD PRESSURE: 102 MMHG

## 2021-06-08 VITALS — WEIGHT: 115.08 LBS | BODY MASS INDEX: 16.11 KG/M2 | HEIGHT: 71 IN

## 2021-06-08 VITALS — SYSTOLIC BLOOD PRESSURE: 148 MMHG | DIASTOLIC BLOOD PRESSURE: 88 MMHG

## 2021-06-08 VITALS — SYSTOLIC BLOOD PRESSURE: 119 MMHG | DIASTOLIC BLOOD PRESSURE: 78 MMHG

## 2021-06-08 VITALS — DIASTOLIC BLOOD PRESSURE: 76 MMHG | SYSTOLIC BLOOD PRESSURE: 123 MMHG

## 2021-06-08 VITALS — DIASTOLIC BLOOD PRESSURE: 72 MMHG | SYSTOLIC BLOOD PRESSURE: 104 MMHG

## 2021-06-08 VITALS — SYSTOLIC BLOOD PRESSURE: 98 MMHG | DIASTOLIC BLOOD PRESSURE: 67 MMHG

## 2021-06-08 VITALS — SYSTOLIC BLOOD PRESSURE: 103 MMHG | DIASTOLIC BLOOD PRESSURE: 72 MMHG

## 2021-06-08 VITALS — DIASTOLIC BLOOD PRESSURE: 76 MMHG | SYSTOLIC BLOOD PRESSURE: 114 MMHG

## 2021-06-08 VITALS — DIASTOLIC BLOOD PRESSURE: 91 MMHG | SYSTOLIC BLOOD PRESSURE: 119 MMHG

## 2021-06-08 VITALS — SYSTOLIC BLOOD PRESSURE: 131 MMHG | DIASTOLIC BLOOD PRESSURE: 91 MMHG

## 2021-06-08 DIAGNOSIS — F17.210: ICD-10-CM

## 2021-06-08 DIAGNOSIS — E43: ICD-10-CM

## 2021-06-08 DIAGNOSIS — F41.9: ICD-10-CM

## 2021-06-08 DIAGNOSIS — Z88.8: ICD-10-CM

## 2021-06-08 DIAGNOSIS — Z79.899: ICD-10-CM

## 2021-06-08 DIAGNOSIS — B44.9: ICD-10-CM

## 2021-06-08 DIAGNOSIS — D69.59: ICD-10-CM

## 2021-06-08 DIAGNOSIS — Z82.49: ICD-10-CM
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
## 2021-06-08 DIAGNOSIS — I50.20: ICD-10-CM

## 2021-06-08 DIAGNOSIS — F12.90: ICD-10-CM

## 2021-06-08 DIAGNOSIS — R56.9: ICD-10-CM

## 2021-06-08 DIAGNOSIS — F14.90: ICD-10-CM

## 2021-06-08 DIAGNOSIS — Z59.0: ICD-10-CM

## 2021-06-08 DIAGNOSIS — D64.9: ICD-10-CM

## 2021-06-08 DIAGNOSIS — J96.01: ICD-10-CM

## 2021-06-08 DIAGNOSIS — D72.829: ICD-10-CM

## 2021-06-08 DIAGNOSIS — F10.231: ICD-10-CM

## 2021-06-08 DIAGNOSIS — T78.3XXA: Primary | ICD-10-CM

## 2021-06-08 DIAGNOSIS — Z86.11: ICD-10-CM

## 2021-06-08 DIAGNOSIS — T46.4X5A: ICD-10-CM

## 2021-06-08 DIAGNOSIS — T38.0X5A: ICD-10-CM

## 2021-06-08 DIAGNOSIS — J44.9: ICD-10-CM

## 2021-06-08 DIAGNOSIS — K21.9: ICD-10-CM

## 2021-06-08 DIAGNOSIS — Z83.3: ICD-10-CM

## 2021-06-08 DIAGNOSIS — I11.0: ICD-10-CM

## 2021-06-08 LAB
ANION GAP SERPL CALC-SCNC: 11 MMOL/L (ref 6–14)
BASOPHILS # BLD AUTO: 0 X10^3/UL (ref 0–0.2)
BASOPHILS NFR BLD: 1 % (ref 0–3)
BUN SERPL-MCNC: 6 MG/DL (ref 8–26)
CALCIUM SERPL-MCNC: 8.5 MG/DL (ref 8.5–10.1)
CHLORIDE SERPL-SCNC: 99 MMOL/L (ref 98–107)
CO2 SERPL-SCNC: 27 MMOL/L (ref 21–32)
CREAT SERPL-MCNC: 0.6 MG/DL (ref 0.7–1.3)
EOSINOPHIL NFR BLD: 0.1 X10^3/UL (ref 0–0.7)
EOSINOPHIL NFR BLD: 2 % (ref 0–3)
ERYTHROCYTE [DISTWIDTH] IN BLOOD BY AUTOMATED COUNT: 13.8 % (ref 11.5–14.5)
GFR SERPLBLD BASED ON 1.73 SQ M-ARVRAT: 168 ML/MIN
GLUCOSE SERPL-MCNC: 83 MG/DL (ref 70–99)
HCT VFR BLD CALC: 38.5 % (ref 39–53)
HGB BLD-MCNC: 13.4 G/DL (ref 13–17.5)
LYMPHOCYTES # BLD: 1.1 X10^3/UL (ref 1–4.8)
LYMPHOCYTES NFR BLD AUTO: 27 % (ref 24–48)
MCH RBC QN AUTO: 33 PG (ref 25–35)
MCHC RBC AUTO-ENTMCNC: 35 G/DL (ref 31–37)
MCV RBC AUTO: 95 FL (ref 79–100)
MONO #: 0.7 X10^3/UL (ref 0–1.1)
MONOCYTES NFR BLD: 17 % (ref 0–9)
NEUT #: 2.1 X10^3/UL (ref 1.8–7.7)
NEUTROPHILS NFR BLD AUTO: 52 % (ref 31–73)
PLATELET # BLD AUTO: 154 X10^3/UL (ref 140–400)
POTASSIUM SERPL-SCNC: 3.9 MMOL/L (ref 3.5–5.1)
RBC # BLD AUTO: 4.06 X10^6/UL (ref 4.3–5.7)
SODIUM SERPL-SCNC: 137 MMOL/L (ref 136–145)
WBC # BLD AUTO: 4 X10^3/UL (ref 4–11)

## 2021-06-08 PROCEDURE — 36600 WITHDRAWAL OF ARTERIAL BLOOD: CPT

## 2021-06-08 PROCEDURE — 87102 FUNGUS ISOLATION CULTURE: CPT

## 2021-06-08 PROCEDURE — 87070 CULTURE OTHR SPECIMN AEROBIC: CPT

## 2021-06-08 PROCEDURE — 85007 BL SMEAR W/DIFF WBC COUNT: CPT

## 2021-06-08 PROCEDURE — 87116 MYCOBACTERIA CULTURE: CPT

## 2021-06-08 PROCEDURE — 71045 X-RAY EXAM CHEST 1 VIEW: CPT

## 2021-06-08 PROCEDURE — 84100 ASSAY OF PHOSPHORUS: CPT

## 2021-06-08 PROCEDURE — 94760 N-INVAS EAR/PLS OXIMETRY 1: CPT

## 2021-06-08 PROCEDURE — 94003 VENT MGMT INPAT SUBQ DAY: CPT

## 2021-06-08 PROCEDURE — 80048 BASIC METABOLIC PNL TOTAL CA: CPT

## 2021-06-08 PROCEDURE — 82962 GLUCOSE BLOOD TEST: CPT

## 2021-06-08 PROCEDURE — 83735 ASSAY OF MAGNESIUM: CPT

## 2021-06-08 PROCEDURE — 87801 DETECT AGNT MULT DNA AMPLI: CPT

## 2021-06-08 PROCEDURE — 82805 BLOOD GASES W/O2 SATURATION: CPT

## 2021-06-08 PROCEDURE — 71260 CT THORAX DX C+: CPT

## 2021-06-08 PROCEDURE — 86003 ALLG SPEC IGE CRUDE XTRC EA: CPT

## 2021-06-08 PROCEDURE — 31622 DX BRONCHOSCOPE/WASH: CPT

## 2021-06-08 PROCEDURE — 85025 COMPLETE CBC W/AUTO DIFF WBC: CPT

## 2021-06-08 PROCEDURE — 87205 SMEAR GRAM STAIN: CPT

## 2021-06-08 PROCEDURE — 80307 DRUG TEST PRSMV CHEM ANLYZR: CPT

## 2021-06-08 PROCEDURE — 36415 COLL VENOUS BLD VENIPUNCTURE: CPT

## 2021-06-08 PROCEDURE — 94640 AIRWAY INHALATION TREATMENT: CPT

## 2021-06-08 PROCEDURE — G0378 HOSPITAL OBSERVATION PER HR: HCPCS

## 2021-06-08 PROCEDURE — 80053 COMPREHEN METABOLIC PANEL: CPT

## 2021-06-08 PROCEDURE — G0379 DIRECT REFER HOSPITAL OBSERV: HCPCS

## 2021-06-08 PROCEDURE — 94002 VENT MGMT INPAT INIT DAY: CPT

## 2021-06-08 RX ADMIN — FAMOTIDINE SCH MG: 10 INJECTION, SOLUTION INTRAVENOUS at 17:47

## 2021-06-08 RX ADMIN — ENOXAPARIN SODIUM SCH MG: 40 INJECTION SUBCUTANEOUS at 20:06

## 2021-06-08 RX ADMIN — METHYLPREDNISOLONE SODIUM SUCCINATE SCH MG: 125 INJECTION, POWDER, FOR SOLUTION INTRAMUSCULAR; INTRAVENOUS at 23:54

## 2021-06-08 RX ADMIN — FAMOTIDINE SCH MG: 10 INJECTION, SOLUTION INTRAVENOUS at 20:05

## 2021-06-08 SDOH — ECONOMIC STABILITY - HOUSING INSECURITY: HOMELESSNESS: Z59.0

## 2021-06-08 NOTE — PDOC1
History and Physical


Date of Admission


Date of Admission


DATE: 6/8/21 


TIME: 15:42





Identification/Chief Complaint


Chief Complaint


Lip swelling





Source


Source:  Caregiver, Chart review, Patient





History of Present Illness


History of Present Illness


Patient is a 55-year-old male past medical history hypertension, who presents to

the ED with complaints of lip swelling that began this morning.  Patient was 

recently discharged from our service and during that time he had echocardiogram 

that showed systolic function is mildly to moderately impaired, with Ejection 

Fraction is 35-40%; there is global hypokinesis of the left ventricle; septal 

motion suggestive of conduction defect.  He was initiated on lisinopril, Toprol-

XL, and amlodipine. Patient reportedly took his prescribed lisinopril today and 

woke up from a nap with noted swelling to his lips and left side of his face.  

He initially denied any history of similar symptoms to the ED attending, but 

when family was present she did note he has a history of similar reaction to his

face and lips about 2-3 years ago.  This reaction responded with medications and

they never did figure out what the cause of this reaction was at that time.  

Denies any family history of angioedema.  Treated with Solu-Medrol and some 

epinephrine in the ER.  Will admit patient for monitoring overnight





Past Medical History


Cardiovascular:  HTN


Pulmonary:  COPD, Other


CENTRAL NERVOUS SYSTEM:  Other


GI:  GERD


Heme/Onc:  No pertinent hx


Hepatobiliary:  Other


Psych:  Anxiety


Rheumatologic:  No pertinent hx


Infectious disease:  Other


Renal/:  No pertinent hx


Endocrine:  No pertinent hx





Past Surgical History


Past Surgical History:  No pertinent history





Family History


Family History:  Diabetes, Hypertension





Social History


Smoke:  <1 pack per day


ALCOHOL:  heavy


Drugs:  Marijuana





Current Medications


Current Medications





Current Medications


Epinephrine HCl (Adrenalin) 0.3 mg 1X  ONCE IM  Last administered on 6/8/21at 

14:30;  Start 6/8/21 at 14:15;  Stop 6/8/21 at 14:16;  Status DC


Methylprednisolone Sodium Succinate (SOLU-Medrol 125MG VIAL) 125 mg 1X  ONCE IV 

Last administered on 6/8/21at 14:31;  Start 6/8/21 at 14:15;  Stop 6/8/21 at 

14:16;  Status DC


Ondansetron HCl (Zofran) 4 mg PRN Q8HRS  PRN IV NAUSEA/VOMITING;  Start 6/8/21 

at 15:45;  Stop 6/9/21 at 15:44


Fentanyl Citrate (Fentanyl 2ml Vial) 50 mcg PRN Q1HR  PRN IV PAIN;  Start 6/8/21

at 15:45;  Stop 6/9/21 at 15:44


Acetaminophen (Tylenol) 650 mg PRN Q4HRS  PRN PO FEVER > 100.3'F;  Start 6/8/21 

at 15:45;  Stop 6/9/21 at 15:44





Active Scripts


Active


Amlodipine Besylate 10 Mg Tablet 10 Mg PO DAILY 30 Days


Reported


Ventolin Hfa Inhaler (Albuterol Sulfate) 18 Gm Hfa.aer.ad 2 Puff INH PRN Q4HRS 

PRN





Allergies


Allergies:  


Coded Allergies:  


     lisinopril (Verified  Allergy, Severe, ANGIOEDEMA, 6/8/21)





ROS


Review of System


GENERAL:  No history of weight change, weakness or fevers.


SKIN: Left-sided facial and lip swelling.  No bruising, hair changes or rashes.


EYES:  No blurred, double or loss of vision.


NOSE AND THROAT:  No history of nosebleeds, hoarseness or sore throat.


HEART:  Denies chest pain, denies palpitations.


LUNGS:  Denies cough, hemoptysis, wheezing or shortness of breath.


GASTROINTESTINAL: Denies nausea, vomiting, abdominal pain.


GENITOURINARY: Denies dysuria, frequency, urgency, hematuria.


NEUROLOGIC:  Denies history of numbness, tingling, tremor or weakness.


PSYCHIATRIC: Denies anxiety, denies depression.


ENDOCRINE:  No history of heat or cold intolerance, polyuria or polydipsia.


EXTREMITIES:  Denies muscle weakness, joint pain, pain on walking or stiffness.





Physical Exam


Physical Exam


General:  Alert, Oriented X3, Cooperative, No acute distress


HEENT:  PERRLA, EOMI


Lungs:  Clear to auscultation, Normal air movement


Heart:  RRR, no murmurs


Cardiovascular:  S1, S2


Abdomen:  Normal bowel sounds, Soft, No tenderness


Extremities:  No clubbing, No cyanosis


Skin: Asymmetric left-sided facial swelling, left upper and lower lip swelling. 

No rashes, No significant lesion


Neuro:  Normal speech, Normal tone, Sensation intact


Psych/Mental Status:  Mental status NL, Mood NL





Vitals


Vitals





Vital Signs








  Date Time  Temp Pulse Resp B/P (MAP) Pulse Ox O2 Delivery O2 Flow Rate FiO2


 


6/8/21 13:52 98.3 82 16 116/82 (93) 100 Room Air  





 98.3       











Labs


Labs





Laboratory Tests








Test


 6/8/21


14:39


 


White Blood Count


 4.0 x10^3/uL


(4.0-11.0)


 


Red Blood Count


 4.06 x10^6/uL


(4.30-5.70)


 


Hemoglobin


 13.4 g/dL


(13.0-17.5)


 


Hematocrit


 38.5 %


(39.0-53.0)


 


Mean Corpuscular Volume 95 fL () 


 


Mean Corpuscular Hemoglobin 33 pg (25-35) 


 


Mean Corpuscular Hemoglobin


Concent 35 g/dL


(31-37)


 


Red Cell Distribution Width


 13.8 %


(11.5-14.5)


 


Platelet Count


 154 x10^3/uL


(140-400)


 


Neutrophils (%) (Auto) 52 % (31-73) 


 


Lymphocytes (%) (Auto) 27 % (24-48) 


 


Monocytes (%) (Auto) 17 % (0-9) 


 


Eosinophils (%) (Auto) 2 % (0-3) 


 


Basophils (%) (Auto) 1 % (0-3) 


 


Neutrophils # (Auto)


 2.1 x10^3/uL


(1.8-7.7)


 


Lymphocytes # (Auto)


 1.1 x10^3/uL


(1.0-4.8)


 


Monocytes # (Auto)


 0.7 x10^3/uL


(0.0-1.1)


 


Eosinophils # (Auto)


 0.1 x10^3/uL


(0.0-0.7)


 


Basophils # (Auto)


 0.0 x10^3/uL


(0.0-0.2)


 


Sodium Level


 137 mmol/L


(136-145)


 


Potassium Level


 3.9 mmol/L


(3.5-5.1)


 


Chloride Level


 99 mmol/L


()


 


Carbon Dioxide Level


 27 mmol/L


(21-32)


 


Anion Gap 11 (6-14) 


 


Blood Urea Nitrogen 6 mg/dL (8-26) 


 


Creatinine


 0.6 mg/dL


(0.7-1.3)


 


Estimated GFR


(Cockcroft-Gault) 168.0 





 


Glucose Level


 83 mg/dL


(70-99)


 


Calcium Level


 8.5 mg/dL


(8.5-10.1)








Laboratory Tests








Test


 6/8/21


14:39


 


White Blood Count


 4.0 x10^3/uL


(4.0-11.0)


 


Red Blood Count


 4.06 x10^6/uL


(4.30-5.70)


 


Hemoglobin


 13.4 g/dL


(13.0-17.5)


 


Hematocrit


 38.5 %


(39.0-53.0)


 


Mean Corpuscular Volume 95 fL () 


 


Mean Corpuscular Hemoglobin 33 pg (25-35) 


 


Mean Corpuscular Hemoglobin


Concent 35 g/dL


(31-37)


 


Red Cell Distribution Width


 13.8 %


(11.5-14.5)


 


Platelet Count


 154 x10^3/uL


(140-400)


 


Neutrophils (%) (Auto) 52 % (31-73) 


 


Lymphocytes (%) (Auto) 27 % (24-48) 


 


Monocytes (%) (Auto) 17 % (0-9) 


 


Eosinophils (%) (Auto) 2 % (0-3) 


 


Basophils (%) (Auto) 1 % (0-3) 


 


Neutrophils # (Auto)


 2.1 x10^3/uL


(1.8-7.7)


 


Lymphocytes # (Auto)


 1.1 x10^3/uL


(1.0-4.8)


 


Monocytes # (Auto)


 0.7 x10^3/uL


(0.0-1.1)


 


Eosinophils # (Auto)


 0.1 x10^3/uL


(0.0-0.7)


 


Basophils # (Auto)


 0.0 x10^3/uL


(0.0-0.2)


 


Sodium Level


 137 mmol/L


(136-145)


 


Potassium Level


 3.9 mmol/L


(3.5-5.1)


 


Chloride Level


 99 mmol/L


()


 


Carbon Dioxide Level


 27 mmol/L


(21-32)


 


Anion Gap 11 (6-14) 


 


Blood Urea Nitrogen 6 mg/dL (8-26) 


 


Creatinine


 0.6 mg/dL


(0.7-1.3)


 


Estimated GFR


(Cockcroft-Gault) 168.0 





 


Glucose Level


 83 mg/dL


(70-99)


 


Calcium Level


 8.5 mg/dL


(8.5-10.1)











VTE Prophylaxis Ordered


VTE Prophylaxis Devices:  No


VTE Pharmacological Prophylaxi:  Yes





Assessment/Plan


Assessment/Plan


Angioedema


Systolic CHF


Hypertension





Plan:


Patient seen epinephrine and Solu-Medrol in ED with noted improvement


We will continue treatment with Benadryl 25 mg every 4 hr , Solu-Medrol 60 mg 

every 6 hours, epinephrine IM as needed


If symptoms are worsening or not improving with epinephrine may provide tranexa

clotilde acid 1 g IV


Patient will need to discontinue lisinopril upon discharge and be prescribed 

losartan due to systolic CHF with ejection fraction 35-40%;


Admit to ICU for monitoring and hopefully discharge tomorrow when noted 

improvement


FEN - Cardiac diet as tolerated


PPX  - Lovenox


FULL CODE


Dispo - admit to ICU for further monitoring


Advance Care Planning: Total time spent face-to-face with patient 17 minutes in 

discussion with goals of care, comfort care, end-of-life care, pain management, 

code status; patient names his wife (Evans Amador) as surrogate decision-maker.





Justifications for Admission


Other Justification














ROSALINO WREN MD             Jun 8, 2021 15:46

## 2021-06-08 NOTE — ED.ADGEN
Past Medical History


Past Medical History:  COPD, TB


Past Surgical History:  No Surgical History


Smoking Status:  Current Every Day Smoker


Alcohol Use:  Heavy


Drug Use:  Cocaine





General Adult


EDM:


Chief Complaint:  ALLERGIC REACTION





HPI:


HPI:





Patient is a 55  year old male coming in via EMS for lip swelling.  Patient 

states he took his medications he was prescribed 2 weeks ago just before laying 

down taking a nap.  Patient states that his lips were normal when he went down 

for a nap but woke up with significant swelling to his left lower lip and some 

swelling to his right upper lip.  Patient denies any history of anaphylaxis or 

allergic reactions before.  New medications include lisinopril.  Patient denies 

any personal or family history of angioedema





Review of Systems:


Review of Systems:


All other systems within normal limits except for as noted in the HPI





Current Medications:





Current Medications








 Medications


  (Trade)  Dose


 Ordered  Sig/Yg  Start Time


 Stop Time Status Last Admin


Dose Admin


 


 Epinephrine HCl


  (Adrenalin)  0.3 mg  1X  ONCE  6/8/21 14:15


 6/8/21 14:16 DC 6/8/21 14:30


0.3 MG


 


 Methylprednisolone


 Sodium Succinate


  (SOLU-Medrol


 125MG VIAL)  125 mg  1X  ONCE  6/8/21 14:15


 6/8/21 14:16 DC 6/8/21 14:31


125 MG











Allergies:


Allergies:








Physical Exam:


PE:


Constitutional: Well developed, well nourished, no acute distress, non-toxic 

appearance. []


HENT: Normocephalic, atraumatic, bilateral external ears normal,  nose normal.  

Edema of left lower lip greater than left upper lip, no swelling of tongue, 

normal gingiva.  Normal oropharynx []


Eyes: PERRLA, conjunctiva normal, no discharge. [] 


Neck: No rigidity, supple, no stridor. [] 


Cardiovascular: Regular rate and rhythm, brisk cap refill []


Lungs & Thorax: Non labored symmetric respirations, no tachypnea or respiratory 

distress []


Abdomen: Soft, nondistended.


Skin: Warm, dry, no erythema, no rash. [] 


Back: Unremarkable


Extremities: No deformities, range of motion grossly intact, no lower extremity 

edema [] 


Neurologic: Alert and oriented X 3, no focal deficits noted. []


Psychologic: Affect normal, judgement normal, mood normal. []





Current Patient Data:


Labs:





                                Laboratory Tests








Test


 6/8/21


14:39


 


White Blood Count


 4.0 x10^3/uL


(4.0-11.0)


 


Red Blood Count


 4.06 x10^6/uL


(4.30-5.70)  L


 


Hemoglobin


 13.4 g/dL


(13.0-17.5)


 


Hematocrit


 38.5 %


(39.0-53.0)  L


 


Mean Corpuscular Volume


 95 fL ()





 


Mean Corpuscular Hemoglobin 33 pg (25-35)  


 


Mean Corpuscular Hemoglobin


Concent 35 g/dL


(31-37)


 


Red Cell Distribution Width


 13.8 %


(11.5-14.5)


 


Platelet Count


 154 x10^3/uL


(140-400)


 


Neutrophils (%) (Auto) 52 % (31-73)  


 


Lymphocytes (%) (Auto) 27 % (24-48)  


 


Monocytes (%) (Auto) 17 % (0-9)  H


 


Eosinophils (%) (Auto) 2 % (0-3)  


 


Basophils (%) (Auto) 1 % (0-3)  


 


Neutrophils # (Auto)


 2.1 x10^3/uL


(1.8-7.7)


 


Lymphocytes # (Auto)


 1.1 x10^3/uL


(1.0-4.8)


 


Monocytes # (Auto)


 0.7 x10^3/uL


(0.0-1.1)


 


Eosinophils # (Auto)


 0.1 x10^3/uL


(0.0-0.7)


 


Basophils # (Auto)


 0.0 x10^3/uL


(0.0-0.2)


 


Sodium Level


 137 mmol/L


(136-145)


 


Potassium Level


 3.9 mmol/L


(3.5-5.1)


 


Chloride Level


 99 mmol/L


()


 


Carbon Dioxide Level


 27 mmol/L


(21-32)


 


Anion Gap 11 (6-14)  


 


Blood Urea Nitrogen


 6 mg/dL (8-26)


L


 


Creatinine


 0.6 mg/dL


(0.7-1.3)  L


 


Estimated GFR


(Cockcroft-Gault) 168.0  





 


Glucose Level


 83 mg/dL


(70-99)


 


Calcium Level


 8.5 mg/dL


(8.5-10.1)





                                Laboratory Tests


6/8/21 14:39








                                Laboratory Tests


6/8/21 14:39








Vital Signs:





                                   Vital Signs








  Date Time  Temp Pulse Resp B/P (MAP) Pulse Ox O2 Delivery O2 Flow Rate FiO2


 


6/8/21 13:52 98.3 82 16 116/82 (93) 100 Room Air  





 98.3       











EKG:


EKG:


[]





Heart Score:


C/O Chest Pain:  No


Risk Factors:


Risk Factors:  DM, Current or recent (<one month) smoker, HTN, HLP, family 

history of CAD, obesity.


Risk Scores:


Score 0 - 3:  2.5% MACE over next 6 weeks - Discharge Home


Score 4 - 6:  20.3% MACE over next 6 weeks - Admit for Clinical Observation


Score 7 - 10:  72.7% MACE over next 6 weeks - Early Invasive Strategies





Radiology/Procedures:


Radiology/Procedures:


[]





Course & Med Decision Making:


Course & Med Decision Making


Discussed with hospitalist admit for observation.  No response to Benadryl, 

epinephrine, Solu-Medrol.





Dragon Disclaimer:


Dragon Disclaimer:


This electronic medical record was generated, in whole or in part, using a voice

 recognition dictation system.





Departure


Departure


Impression:  


   Primary Impression:  


   ACE inhibitor-aggravated angioedema


Disposition:  09 ADMITTED AS INPATIENT


Condition:  GUARDED


Referrals:  


EVARISTO MATTA MD (PCP)











JAMAL ANDUJAR MD             Jun 8, 2021 14:07

## 2021-06-09 VITALS — DIASTOLIC BLOOD PRESSURE: 92 MMHG | SYSTOLIC BLOOD PRESSURE: 143 MMHG

## 2021-06-09 VITALS — DIASTOLIC BLOOD PRESSURE: 87 MMHG | SYSTOLIC BLOOD PRESSURE: 131 MMHG

## 2021-06-09 VITALS — SYSTOLIC BLOOD PRESSURE: 118 MMHG | DIASTOLIC BLOOD PRESSURE: 81 MMHG

## 2021-06-09 VITALS — DIASTOLIC BLOOD PRESSURE: 90 MMHG | SYSTOLIC BLOOD PRESSURE: 146 MMHG

## 2021-06-09 VITALS — DIASTOLIC BLOOD PRESSURE: 95 MMHG | SYSTOLIC BLOOD PRESSURE: 159 MMHG

## 2021-06-09 VITALS — SYSTOLIC BLOOD PRESSURE: 144 MMHG | DIASTOLIC BLOOD PRESSURE: 92 MMHG

## 2021-06-09 VITALS — DIASTOLIC BLOOD PRESSURE: 103 MMHG | SYSTOLIC BLOOD PRESSURE: 175 MMHG

## 2021-06-09 VITALS — SYSTOLIC BLOOD PRESSURE: 127 MMHG | DIASTOLIC BLOOD PRESSURE: 85 MMHG

## 2021-06-09 VITALS — DIASTOLIC BLOOD PRESSURE: 71 MMHG | SYSTOLIC BLOOD PRESSURE: 99 MMHG

## 2021-06-09 VITALS — DIASTOLIC BLOOD PRESSURE: 83 MMHG | SYSTOLIC BLOOD PRESSURE: 106 MMHG

## 2021-06-09 VITALS — SYSTOLIC BLOOD PRESSURE: 104 MMHG | DIASTOLIC BLOOD PRESSURE: 77 MMHG

## 2021-06-09 VITALS — DIASTOLIC BLOOD PRESSURE: 94 MMHG | SYSTOLIC BLOOD PRESSURE: 160 MMHG

## 2021-06-09 VITALS — DIASTOLIC BLOOD PRESSURE: 114 MMHG | SYSTOLIC BLOOD PRESSURE: 204 MMHG

## 2021-06-09 VITALS — DIASTOLIC BLOOD PRESSURE: 89 MMHG | SYSTOLIC BLOOD PRESSURE: 141 MMHG

## 2021-06-09 VITALS — SYSTOLIC BLOOD PRESSURE: 103 MMHG | DIASTOLIC BLOOD PRESSURE: 98 MMHG

## 2021-06-09 VITALS — SYSTOLIC BLOOD PRESSURE: 113 MMHG | DIASTOLIC BLOOD PRESSURE: 77 MMHG

## 2021-06-09 VITALS — SYSTOLIC BLOOD PRESSURE: 113 MMHG | DIASTOLIC BLOOD PRESSURE: 80 MMHG

## 2021-06-09 VITALS — SYSTOLIC BLOOD PRESSURE: 139 MMHG | DIASTOLIC BLOOD PRESSURE: 95 MMHG

## 2021-06-09 VITALS — DIASTOLIC BLOOD PRESSURE: 78 MMHG | SYSTOLIC BLOOD PRESSURE: 110 MMHG

## 2021-06-09 VITALS — DIASTOLIC BLOOD PRESSURE: 83 MMHG | SYSTOLIC BLOOD PRESSURE: 107 MMHG

## 2021-06-09 VITALS — DIASTOLIC BLOOD PRESSURE: 100 MMHG | SYSTOLIC BLOOD PRESSURE: 146 MMHG

## 2021-06-09 VITALS — DIASTOLIC BLOOD PRESSURE: 77 MMHG | SYSTOLIC BLOOD PRESSURE: 103 MMHG

## 2021-06-09 VITALS — SYSTOLIC BLOOD PRESSURE: 164 MMHG | DIASTOLIC BLOOD PRESSURE: 101 MMHG

## 2021-06-09 VITALS — SYSTOLIC BLOOD PRESSURE: 145 MMHG | DIASTOLIC BLOOD PRESSURE: 58 MMHG

## 2021-06-09 VITALS — SYSTOLIC BLOOD PRESSURE: 125 MMHG | DIASTOLIC BLOOD PRESSURE: 81 MMHG

## 2021-06-09 VITALS — DIASTOLIC BLOOD PRESSURE: 92 MMHG | SYSTOLIC BLOOD PRESSURE: 133 MMHG

## 2021-06-09 VITALS — DIASTOLIC BLOOD PRESSURE: 88 MMHG | SYSTOLIC BLOOD PRESSURE: 138 MMHG

## 2021-06-09 VITALS — DIASTOLIC BLOOD PRESSURE: 56 MMHG | SYSTOLIC BLOOD PRESSURE: 133 MMHG

## 2021-06-09 VITALS — DIASTOLIC BLOOD PRESSURE: 103 MMHG | SYSTOLIC BLOOD PRESSURE: 166 MMHG

## 2021-06-09 VITALS — SYSTOLIC BLOOD PRESSURE: 131 MMHG | DIASTOLIC BLOOD PRESSURE: 87 MMHG

## 2021-06-09 VITALS — DIASTOLIC BLOOD PRESSURE: 92 MMHG | SYSTOLIC BLOOD PRESSURE: 136 MMHG

## 2021-06-09 VITALS — DIASTOLIC BLOOD PRESSURE: 95 MMHG | SYSTOLIC BLOOD PRESSURE: 142 MMHG

## 2021-06-09 VITALS — SYSTOLIC BLOOD PRESSURE: 143 MMHG | DIASTOLIC BLOOD PRESSURE: 108 MMHG

## 2021-06-09 VITALS — SYSTOLIC BLOOD PRESSURE: 169 MMHG | DIASTOLIC BLOOD PRESSURE: 98 MMHG

## 2021-06-09 LAB
ANION GAP SERPL CALC-SCNC: 11 MMOL/L (ref 6–14)
BASE EXCESS ABG: -3 MMOL/L (ref -3–3)
BASOPHILS # BLD AUTO: 0 X10^3/UL (ref 0–0.2)
BASOPHILS NFR BLD: 0 % (ref 0–3)
BUN SERPL-MCNC: 14 MG/DL (ref 8–26)
CALCIUM SERPL-MCNC: 8.5 MG/DL (ref 8.5–10.1)
CHLORIDE SERPL-SCNC: 100 MMOL/L (ref 98–107)
CO2 SERPL-SCNC: 25 MMOL/L (ref 21–32)
CREAT SERPL-MCNC: 0.7 MG/DL (ref 0.7–1.3)
EOSINOPHIL NFR BLD: 0 % (ref 0–3)
EOSINOPHIL NFR BLD: 0 X10^3/UL (ref 0–0.7)
ERYTHROCYTE [DISTWIDTH] IN BLOOD BY AUTOMATED COUNT: 13.6 % (ref 11.5–14.5)
GFR SERPLBLD BASED ON 1.73 SQ M-ARVRAT: 140.7 ML/MIN
GLUCOSE SERPL-MCNC: 282 MG/DL (ref 70–99)
HCO3 BLDA-SCNC: 23 MMOL/L (ref 21–28)
HCT VFR BLD CALC: 35.8 % (ref 39–53)
HGB BLD-MCNC: 12.2 G/DL (ref 13–17.5)
INSPIRATION SETTING TIME VENT: 100
LYMPHOCYTES # BLD: 0.1 X10^3/UL (ref 1–4.8)
LYMPHOCYTES NFR BLD AUTO: 1 % (ref 24–48)
MCH RBC QN AUTO: 33 PG (ref 25–35)
MCHC RBC AUTO-ENTMCNC: 34 G/DL (ref 31–37)
MCV RBC AUTO: 96 FL (ref 79–100)
MONO #: 0.2 X10^3/UL (ref 0–1.1)
MONOCYTES NFR BLD: 3 % (ref 0–9)
NEUT #: 7.8 X10^3/UL (ref 1.8–7.7)
NEUTROPHILS NFR BLD AUTO: 96 % (ref 31–73)
PCO2 BLDA: 45 MMHG (ref 35–46)
PLATELET # BLD AUTO: 124 X10^3/UL (ref 140–400)
PO2 BLDA: 517 MMHG (ref 75–108)
POTASSIUM SERPL-SCNC: 4.3 MMOL/L (ref 3.5–5.1)
RBC # BLD AUTO: 3.74 X10^6/UL (ref 4.3–5.7)
SAO2 % BLDA: > 100 % (ref 92–99)
SODIUM SERPL-SCNC: 136 MMOL/L (ref 136–145)
WBC # BLD AUTO: 8.1 X10^3/UL (ref 4–11)

## 2021-06-09 PROCEDURE — 0B110F4 BYPASS TRACHEA TO CUTANEOUS WITH TRACHEOSTOMY DEVICE, OPEN APPROACH: ICD-10-PCS | Performed by: SURGERY

## 2021-06-09 PROCEDURE — 5A1955Z RESPIRATORY VENTILATION, GREATER THAN 96 CONSECUTIVE HOURS: ICD-10-PCS | Performed by: SURGERY

## 2021-06-09 RX ADMIN — INSULIN GLARGINE SCH UNIT: 100 INJECTION, SOLUTION SUBCUTANEOUS at 21:00

## 2021-06-09 RX ADMIN — IPRATROPIUM BROMIDE AND ALBUTEROL SULFATE SCH ML: .5; 3 SOLUTION RESPIRATORY (INHALATION) at 08:00

## 2021-06-09 RX ADMIN — IPRATROPIUM BROMIDE AND ALBUTEROL SULFATE SCH ML: .5; 3 SOLUTION RESPIRATORY (INHALATION) at 15:24

## 2021-06-09 RX ADMIN — PROPOFOL PRN MLS/HR: 10 INJECTION, EMULSION INTRAVENOUS at 14:39

## 2021-06-09 RX ADMIN — INSULIN LISPRO SCH UNITS: 100 INJECTION, SOLUTION INTRAVENOUS; SUBCUTANEOUS at 17:15

## 2021-06-09 RX ADMIN — METHYLPREDNISOLONE SODIUM SUCCINATE SCH MG: 125 INJECTION, POWDER, FOR SOLUTION INTRAMUSCULAR; INTRAVENOUS at 07:20

## 2021-06-09 RX ADMIN — METHYLPREDNISOLONE SODIUM SUCCINATE SCH MG: 125 INJECTION, POWDER, FOR SOLUTION INTRAMUSCULAR; INTRAVENOUS at 12:20

## 2021-06-09 RX ADMIN — ENOXAPARIN SODIUM SCH MG: 40 INJECTION SUBCUTANEOUS at 21:25

## 2021-06-09 RX ADMIN — PROPOFOL PRN MLS/HR: 10 INJECTION, EMULSION INTRAVENOUS at 10:43

## 2021-06-09 RX ADMIN — PROPOFOL PRN MLS/HR: 10 INJECTION, EMULSION INTRAVENOUS at 19:47

## 2021-06-09 RX ADMIN — PROPOFOL PRN MLS/HR: 10 INJECTION, EMULSION INTRAVENOUS at 07:44

## 2021-06-09 RX ADMIN — FAMOTIDINE SCH MG: 10 INJECTION, SOLUTION INTRAVENOUS at 09:51

## 2021-06-09 RX ADMIN — PROPOFOL PRN MLS/HR: 10 INJECTION, EMULSION INTRAVENOUS at 06:00

## 2021-06-09 RX ADMIN — FAMOTIDINE SCH MG: 10 INJECTION, SOLUTION INTRAVENOUS at 21:25

## 2021-06-09 RX ADMIN — METHYLPREDNISOLONE SODIUM SUCCINATE SCH MG: 125 INJECTION, POWDER, FOR SOLUTION INTRAMUSCULAR; INTRAVENOUS at 16:43

## 2021-06-09 RX ADMIN — IPRATROPIUM BROMIDE AND ALBUTEROL SULFATE SCH ML: .5; 3 SOLUTION RESPIRATORY (INHALATION) at 19:36

## 2021-06-09 RX ADMIN — METHYLPREDNISOLONE SODIUM SUCCINATE SCH MG: 125 INJECTION, POWDER, FOR SOLUTION INTRAMUSCULAR; INTRAVENOUS at 23:42

## 2021-06-09 NOTE — PDOC
Provider Note


Date of Service:


DATE: 6/9/21 


TIME: 07:38


Provider Note


pt in icu with angioedema. tongue and lips very swollen, copious amt of bloody 

secretions. Pt very anxious and complaints of breathing difficulty. Surgeon 

called for standby. O2/mask with sats in upper 80's. etomidate 4mg iv with spon 

resp. attempted intubation with glide scope without success. #4LMA placed and 

assisted ventilation with ambu with O2 sat increasing to 98%. sedation started 

with diprivan and attempted intubation with bronchoscope without success. 

Tracheostomy performed by Dr Lopez. positive etco2, O2 sats remain 98





Justifications for Admission


Other Justification


Angioedema











FRANCISCO J BARRAZA CRNA              Jun 9, 2021 07:57

## 2021-06-09 NOTE — NUR
At 0400 assessment patient still had swelling to both lips but would have intermittent 
swelling through the night, Epi/Im given with some relief in swelling but none to his tongue 
at that time and evaluated by Donna RT. No complaints at that time. VSS. Ice chips at 
bedside.



0430h this am patient had a sudden onset of drooling and complained of pain to his tongue he 
kept biting his tongue. Assisted with suction. Visualized tongue,observed increased 
swelling.  Notified Dr. Marin of changes to patient. Lashae consulted and order for 
intubation. Nursing supervisor notified. 



VERONA Powell and Donna and Ally RT at bedside with oliva Carrion notified for possible bedside 
trach. 



Attempt to notified of change in patient condition and that a trach was placed.  Message 
left for call back.



Times are approximate with late entries due to system down.

## 2021-06-09 NOTE — RAD
INDICATION: Reason: s/p trach / Spl. Instructions:  / History: 



COMPARISON: January 31, 2019



FINDINGS:



Single view of chest obtained.

Dense opacity at the right upper thorax is again seen. Calcified lymph nodes at the right side of med
iastinum again seen. Blunting of the right costophrenic angle with volume loss on the right. Regions 
of scarring within the right lung. Old left rib fracture.





IMPRESSION:



*  Interval placement of tracheostomy tube.



*  Repeat demonstration of dense opacity in the right upper chest which again could be from the patie
nt's known chronic cavitary lesion in the area.



*  Disorganized pulmonary markings bilaterally which can be seen with causes such as emphysema.



*  Hazy opacities within the right lung which could be secondary to atelectasis or infiltrate.



Electronically signed by: Federico Velasquez MD (6/9/2021 8:10 AM) [id number]

## 2021-06-09 NOTE — PDOC
PULMONARY PROGRESS NOTES


DATE: 6/9/21 


TIME: 09:48


Vitals





Vital Signs








  Date Time  Temp Pulse Resp B/P (MAP) Pulse Ox O2 Delivery O2 Flow Rate FiO2


 


6/9/21 08:00      Mechanical Ventilator  


 


6/9/21 07:51   16  100   


 


6/9/21 06:45  114  146/100 (115)    


 


6/9/21 00:01 97.7       





 97.7       


 


6/8/21 19:52       3.0 








Lungs:  Clear, Crackles


Cardiovascular:  S1


Abdomen:  Soft


Extremities:  No Edema


Labs





Laboratory Tests








Test


 6/8/21


14:39 6/9/21


07:28 6/9/21


07:33 6/9/21


08:35


 


White Blood Count


 4.0 x10^3/uL


(4.0-11.0) 


 


 





 


Red Blood Count


 4.06 x10^6/uL


(4.30-5.70) 


 


 





 


Hemoglobin


 13.4 g/dL


(13.0-17.5) 


 


 





 


Hematocrit


 38.5 %


(39.0-53.0) 


 


 





 


Mean Corpuscular Volume 95 fL ()    


 


Mean Corpuscular Hemoglobin 33 pg (25-35)    


 


Mean Corpuscular Hemoglobin


Concent 35 g/dL


(31-37) 


 


 





 


Red Cell Distribution Width


 13.8 %


(11.5-14.5) 


 


 





 


Platelet Count


 154 x10^3/uL


(140-400) 


 


 





 


Neutrophils (%) (Auto) 52 % (31-73)    


 


Lymphocytes (%) (Auto) 27 % (24-48)    


 


Monocytes (%) (Auto) 17 % (0-9)    


 


Eosinophils (%) (Auto) 2 % (0-3)    


 


Basophils (%) (Auto) 1 % (0-3)    


 


Neutrophils # (Auto)


 2.1 x10^3/uL


(1.8-7.7) 


 


 





 


Lymphocytes # (Auto)


 1.1 x10^3/uL


(1.0-4.8) 


 


 





 


Monocytes # (Auto)


 0.7 x10^3/uL


(0.0-1.1) 


 


 





 


Eosinophils # (Auto)


 0.1 x10^3/uL


(0.0-0.7) 


 


 





 


Basophils # (Auto)


 0.0 x10^3/uL


(0.0-0.2) 


 


 





 


Sodium Level


 137 mmol/L


(136-145) 


 


 136 mmol/L


(136-145)


 


Potassium Level


 3.9 mmol/L


(3.5-5.1) 


 


 4.3 mmol/L


(3.5-5.1)


 


Chloride Level


 99 mmol/L


() 


 


 100 mmol/L


()


 


Carbon Dioxide Level


 27 mmol/L


(21-32) 


 


 25 mmol/L


(21-32)


 


Anion Gap 11 (6-14)    11 (6-14) 


 


Blood Urea Nitrogen


 6 mg/dL (8-26) 


 


 


 14 mg/dL


(8-26)


 


Creatinine


 0.6 mg/dL


(0.7-1.3) 


 


 0.7 mg/dL


(0.7-1.3)


 


Estimated GFR


(Cockcroft-Gault) 168.0 


 


 


 140.7 





 


Glucose Level


 83 mg/dL


(70-99) 


 


 282 mg/dL


(70-99)


 


Calcium Level


 8.5 mg/dL


(8.5-10.1) 


 


 8.5 mg/dL


(8.5-10.1)


 


O2 Saturation


 


 > 100 %


(92-99) 


 





 


Arterial Blood pH


 


 7.34


(7.35-7.45) 


 





 


Arterial Blood pCO2 at


Patient Temp 


 45 mmHg


(35-46) 


 





 


Arterial Blood pO2 at Patient


Temp 


 517 mmHg


() 


 





 


Arterial Blood HCO3


 


 23 mmol/L


(21-28) 


 





 


Arterial Blood Base Excess


 


 -3 mmol/L


(-3-3) 


 





 


FiO2  100   


 


Glucose (Fingerstick)


 


 


 292 mg/dL


(70-99) 











Laboratory Tests








Test


 6/8/21


14:39 6/9/21


07:28 6/9/21


07:33 6/9/21


08:35


 


White Blood Count


 4.0 x10^3/uL


(4.0-11.0) 


 


 





 


Red Blood Count


 4.06 x10^6/uL


(4.30-5.70) 


 


 





 


Hemoglobin


 13.4 g/dL


(13.0-17.5) 


 


 





 


Hematocrit


 38.5 %


(39.0-53.0) 


 


 





 


Mean Corpuscular Volume 95 fL ()    


 


Mean Corpuscular Hemoglobin 33 pg (25-35)    


 


Mean Corpuscular Hemoglobin


Concent 35 g/dL


(31-37) 


 


 





 


Red Cell Distribution Width


 13.8 %


(11.5-14.5) 


 


 





 


Platelet Count


 154 x10^3/uL


(140-400) 


 


 





 


Neutrophils (%) (Auto) 52 % (31-73)    


 


Lymphocytes (%) (Auto) 27 % (24-48)    


 


Monocytes (%) (Auto) 17 % (0-9)    


 


Eosinophils (%) (Auto) 2 % (0-3)    


 


Basophils (%) (Auto) 1 % (0-3)    


 


Neutrophils # (Auto)


 2.1 x10^3/uL


(1.8-7.7) 


 


 





 


Lymphocytes # (Auto)


 1.1 x10^3/uL


(1.0-4.8) 


 


 





 


Monocytes # (Auto)


 0.7 x10^3/uL


(0.0-1.1) 


 


 





 


Eosinophils # (Auto)


 0.1 x10^3/uL


(0.0-0.7) 


 


 





 


Basophils # (Auto)


 0.0 x10^3/uL


(0.0-0.2) 


 


 





 


Sodium Level


 137 mmol/L


(136-145) 


 


 136 mmol/L


(136-145)


 


Potassium Level


 3.9 mmol/L


(3.5-5.1) 


 


 4.3 mmol/L


(3.5-5.1)


 


Chloride Level


 99 mmol/L


() 


 


 100 mmol/L


()


 


Carbon Dioxide Level


 27 mmol/L


(21-32) 


 


 25 mmol/L


(21-32)


 


Anion Gap 11 (6-14)    11 (6-14) 


 


Blood Urea Nitrogen


 6 mg/dL (8-26) 


 


 


 14 mg/dL


(8-26)


 


Creatinine


 0.6 mg/dL


(0.7-1.3) 


 


 0.7 mg/dL


(0.7-1.3)


 


Estimated GFR


(Cockcroft-Gault) 168.0 


 


 


 140.7 





 


Glucose Level


 83 mg/dL


(70-99) 


 


 282 mg/dL


(70-99)


 


Calcium Level


 8.5 mg/dL


(8.5-10.1) 


 


 8.5 mg/dL


(8.5-10.1)


 


O2 Saturation


 


 > 100 %


(92-99) 


 





 


Arterial Blood pH


 


 7.34


(7.35-7.45) 


 





 


Arterial Blood pCO2 at


Patient Temp 


 45 mmHg


(35-46) 


 





 


Arterial Blood pO2 at Patient


Temp 


 517 mmHg


() 


 





 


Arterial Blood HCO3


 


 23 mmol/L


(21-28) 


 





 


Arterial Blood Base Excess


 


 -3 mmol/L


(-3-3) 


 





 


FiO2  100   


 


Glucose (Fingerstick)


 


 


 292 mg/dL


(70-99) 











Medications





Active Scripts








 Medications  Dose


 Route/Sig


 Max Daily Dose Days Date Category


 


 Metoprolol


 Succinate ( Xl )


  (Metoprolol


 Succinate) 25 Mg


 Tab.er.24h  25 Mg


 PO DAILY


   6/8/21 Reported


 


 Hydrochlorothiazide


 Tablet


  (Hydrochlorothiazide)


 12.5 Mg Tablet  12.5 Mg


 PO DAILY


   6/8/21 Reported


 


 Ventolin Hfa


 Inhaler


  (Albuterol


 Sulfate) 18 Gm


 Hfa.aer.ad  2 Puff


 INH PRN Q4HRS PRN


   8/15/19 Reported


 


 Amlodipine


 Besylate 10 Mg


 Tablet  10 Mg


 PO DAILY


  30 9/29/17 Rx

















NADIA ANNE MD               Jun 9, 2021 09:48

## 2021-06-09 NOTE — PDOC
[facility] PROGRESS NOTE


Date of Service


DOS:


DATE: 6/9/21 


TIME: 13:54





Chief Complaint


Chief Complaint


Angioedema


Systolic CHF


Hypertension





Plan:


Patient seen epinephrine and Solu-Medrol in ED with noted improvement


We will continue treatment with Benadryl 25 mg every 4 hr , Solu-Medrol 60 mg 

every 6 hours, epinephrine IM as needed


If symptoms are worsening or not improving with epinephrine may provide 

tranexamic acid 1 g IV


Patient will need to discontinue lisinopril upon discharge and be prescribed 

losartan due to systolic CHF with ejection fraction 35-40%;


Admit to ICU for monitoring and hopefully discharge tomorrow when noted 

improvement


FEN - Cardiac diet as tolerated


PPX  - Lovenox


FULL CODE


Dispo - admit to ICU for further monitoring





History of Present Illness


History of Present Illness


Patient is a 55-year-old male past medical history hypertension, who presents to

the ED with complaints of lip swelling that began this morning.  Patient was 

recently discharged from our service and during that time he had echocardiogram 

that showed systolic function is mildly to moderately impaired, with Ejection 

Fraction is 35-40%; there is global hypokinesis of the left ventricle; septal 

motion suggestive of conduction defect.  He was initiated on lisinopril, Toprol-

XL, and amlodipine. Patient reportedly took his prescribed lisinopril today and 

woke up from a nap with noted swelling to his lips and left side of his face.  

He initially denied any history of similar symptoms to the ED attending, but 

when family was present she did note he has a history of similar reaction to his

face and lips about 2-3 years ago.  This reaction responded with medications and

they never did figure out what the cause of this reaction was at that time.  

Denies any family history of angioedema.  Treated with Solu-Medrol and some 

epinephrine in the ER.  Will admit patient for monitoring overnight





6/9/2021: Due to concerns of swelling to tongue and compromised airway, 

anesthesia was consulted to help perform tracheostomy.  Patient currently on 

vent, FiO2 40%, PEEP 5.  Had discussion with wife at bedside, she states that 

history of similar episode occurred roughly 3 years ago and she believes this 

reaction was after taking a blood pressure medication she cannot recall.  We 

will continue supportive care.  Due to some steroid-induced hyperglycemia will 

initiate insulin treatment.  2.  Time 30 minutes spent reviewing charts, 

reviewing labs, reviewing imaging, discussion with wife bedside, discussion with

pulmonology, and discussion with RN.





Vitals/I&O


Vitals/I&O:





                                   Vital Signs








  Date Time  Temp Pulse Resp B/P (MAP) Pulse Ox O2 Delivery O2 Flow Rate FiO2


 


6/9/21 12:30  55 14 133/92 (106) 100 Ventilator  


 


6/9/21 12:00 97.7       





 97.7       


 


6/8/21 19:52       3.0 














                                    I & O   


 


 6/8/21 6/8/21 6/9/21





 15:00 23:00 07:00


 


Intake Total  200 ml 200 ml


 


Output Total  625 ml 


 


Balance  -425 ml 200 ml











Physical Exam


General:  No acute distress


Heart:  Regular rate


Lungs:  Clear, Other (Coarse breath sounds)


Abdomen:  Normal bowel sounds, Soft


Extremities:  No clubbing, No cyanosis


Skin:  No rashes, No breakdown, Other (Tracheostomy in place)





Labs


Labs:





Laboratory Tests








Test


 6/8/21


14:39 6/9/21


07:28 6/9/21


07:33 6/9/21


08:35


 


White Blood Count


 4.0 x10^3/uL


(4.0-11.0) 


 


 8.1 x10^3/uL


(4.0-11.0)


 


Red Blood Count


 4.06 x10^6/uL


(4.30-5.70) 


 


 3.74 x10^6/uL


(4.30-5.70)


 


Hemoglobin


 13.4 g/dL


(13.0-17.5) 


 


 12.2 g/dL


(13.0-17.5)


 


Hematocrit


 38.5 %


(39.0-53.0) 


 


 35.8 %


(39.0-53.0)


 


Mean Corpuscular Volume 95 fL ()    96 fL () 


 


Mean Corpuscular Hemoglobin 33 pg (25-35)    33 pg (25-35) 


 


Mean Corpuscular Hemoglobin


Concent 35 g/dL


(31-37) 


 


 34 g/dL


(31-37)


 


Red Cell Distribution Width


 13.8 %


(11.5-14.5) 


 


 13.6 %


(11.5-14.5)


 


Platelet Count


 154 x10^3/uL


(140-400) 


 


 124 x10^3/uL


(140-400)


 


Neutrophils (%) (Auto) 52 % (31-73)    96 % (31-73) 


 


Lymphocytes (%) (Auto) 27 % (24-48)    1 % (24-48) 


 


Monocytes (%) (Auto) 17 % (0-9)    3 % (0-9) 


 


Eosinophils (%) (Auto) 2 % (0-3)    0 % (0-3) 


 


Basophils (%) (Auto) 1 % (0-3)    0 % (0-3) 


 


Neutrophils # (Auto)


 2.1 x10^3/uL


(1.8-7.7) 


 


 7.8 x10^3/uL


(1.8-7.7)


 


Lymphocytes # (Auto)


 1.1 x10^3/uL


(1.0-4.8) 


 


 0.1 x10^3/uL


(1.0-4.8)


 


Monocytes # (Auto)


 0.7 x10^3/uL


(0.0-1.1) 


 


 0.2 x10^3/uL


(0.0-1.1)


 


Eosinophils # (Auto)


 0.1 x10^3/uL


(0.0-0.7) 


 


 0.0 x10^3/uL


(0.0-0.7)


 


Basophils # (Auto)


 0.0 x10^3/uL


(0.0-0.2) 


 


 0.0 x10^3/uL


(0.0-0.2)


 


Sodium Level


 137 mmol/L


(136-145) 


 


 136 mmol/L


(136-145)


 


Potassium Level


 3.9 mmol/L


(3.5-5.1) 


 


 4.3 mmol/L


(3.5-5.1)


 


Chloride Level


 99 mmol/L


() 


 


 100 mmol/L


()


 


Carbon Dioxide Level


 27 mmol/L


(21-32) 


 


 25 mmol/L


(21-32)


 


Anion Gap 11 (6-14)    11 (6-14) 


 


Blood Urea Nitrogen


 6 mg/dL (8-26) 


 


 


 14 mg/dL


(8-26)


 


Creatinine


 0.6 mg/dL


(0.7-1.3) 


 


 0.7 mg/dL


(0.7-1.3)


 


Estimated GFR


(Cockcroft-Gault) 168.0 


 


 


 140.7 





 


Glucose Level


 83 mg/dL


(70-99) 


 


 282 mg/dL


(70-99)


 


Calcium Level


 8.5 mg/dL


(8.5-10.1) 


 


 8.5 mg/dL


(8.5-10.1)


 


O2 Saturation


 


 > 100 %


(92-99) 


 





 


Arterial Blood pH


 


 7.34


(7.35-7.45) 


 





 


Arterial Blood pCO2 at


Patient Temp 


 45 mmHg


(35-46) 


 





 


Arterial Blood pO2 at Patient


Temp 


 517 mmHg


() 


 





 


Arterial Blood HCO3


 


 23 mmol/L


(21-28) 


 





 


Arterial Blood Base Excess


 


 -3 mmol/L


(-3-3) 


 





 


FiO2  100   


 


Glucose (Fingerstick)


 


 


 292 mg/dL


(70-99) 





 


Test


 6/9/21


12:17 


 


 





 


Glucose (Fingerstick)


 226 mg/dL


(70-99) 


 


 














Assessment and Plan


Assessmemt and Plan


Problems


Medical Problems:


(1) ACE inhibitor-aggravated angioedema


Status: Acute  











Comment


Review of Relevant


I have reviewed the following items elizabeth (where applicable) has been applied.


Medications:





Current Medications








 Medications


  (Trade)  Dose


 Ordered  Sig/Yg


 Route


 PRN Reason  Start Time


 Stop Time Status Last Admin


Dose Admin


 


 Epinephrine HCl


  (Adrenalin)  0.3 mg  1X  ONCE


 IM


   6/8/21 14:15


 6/8/21 14:16 DC 6/8/21 14:30





 


 Methylprednisolone


 Sodium Succinate


  (SOLU-Medrol


 125MG VIAL)  125 mg  1X  ONCE


 IV


   6/8/21 14:15


 6/8/21 14:16 DC 6/8/21 14:31





 


 Methylprednisolone


 Sodium Succinate


  (SOLU-Medrol


 125MG VIAL)  60 mg  Q6HRS


 IV


   6/9/21 00:00


    6/9/21 12:20





 


 Diphenhydramine


 HCl


  (Benadryl)  25 mg  Q4HRS


 IVP


   6/8/21 20:00


    6/9/21 12:20





 


 Epinephrine HCl


  (Adrenalin)  0.3 mg  PRN Q5MIN  PRN


 IM


 ALLERGIC REACTION  6/8/21 16:15


    6/9/21 05:20





 


 Famotidine


  (Pepcid Vial)  20 mg  BID


 IVP


   6/8/21 16:30


    6/9/21 09:51





 


 Lorazepam


  (Ativan Inj)  0.5 mg  PRN Q6HRS  PRN


 IVP


 ANXIETY / AGITATION  6/8/21 16:30


    6/9/21 06:00





 


 Enoxaparin Sodium


  (Lovenox 40mg


 Syringe)  40 mg  Q24H


 SQ


   6/8/21 21:00


    6/8/21 20:06





 


 Fentanyl Citrate  30 ml @ 0


 mls/hr  CONT  PRN


 IV


 SEE PROTOCOL  6/9/21 07:00


    6/9/21 07:21





 


 Propofol  100 ml @ 0


 mls/hr  CONT  PRN


 IV


 PER PROTOCOL  6/9/21 07:00


    6/9/21 10:43





 


 Midazolam HCl


  (Versed)  5 mg  1X  ONCE


 IV


   6/9/21 07:15


 6/9/21 07:28 DC 6/9/21 07:19





 


 Succinylcholine


 Chloride


  (Anectine)  200 mg  1X  ONCE


 IV


   6/9/21 08:00


 6/9/21 08:01 DC 6/9/21 05:30





 


 Etomidate


  (Amidate)  20 mg  1X  ONCE


 IV


   6/9/21 08:00


 6/9/21 08:01 DC 6/9/21 05:30














Justifications for Admission


Other Justification


Angioedema











ROSALINO WREN MD             Jun 9, 2021 14:00

## 2021-06-09 NOTE — PDOC4
OPERATIVE NOTE


Date:


Date:  Jun 9, 2021





Pre-Op Diagnosis:


Angioedema





Post-Op Diagnosis:


same





Procedure Performed:


percutaneous tracheostomy (specifically percutaneous tracheostomy with cuffed 8 

shiley)





Surgeon:


Julián Knott





Anesthesia Type:


GETA plus local





Blood Loss:


50





Specimans Obtained:


none





Findings:


normal anatomy





Complications:


none





Operative Note:


Called emergently to ICU secondary to need for surgical airway.  Attempts at 

intubating pt unsuccessful.  As such, surgical airway indicated.  Anterior neck 

prepped in the usual fashion.  Incision made with 15 blade.  2nd tracheal ring 

palpated.  Using seldinger technique, airway aspirated.  Blue Rhino system used 

to dilated tracheotomy and place 8 Shiley tracheostomy.  Balloon inflated.  End 

tidal CO2 detected and patient successfully oxygenated and ventilated.  

Bronchoscopy confirmed placement.  3 0 prolene secured tracheostomy.





Patient stable and returned to care of ICU.











CINTHYA KNOTT MD              Jun 9, 2021 06:43

## 2021-06-09 NOTE — NUR
SS following for discharge planning. SS reviewed pt chart and discussed with pt RN. Pt is 
from home with spouse and is currently on the vent at 40%. Pt had emergent trach this 
morning. Pt on Propofol and Fentanyl. Not stable. SS will continue to follow for discharge 
planning.

## 2021-06-09 NOTE — CONS
DATE OF CONSULTATION: 06/09/2021

ATTENDING PHYSICIAN:  Dr. Marin.



REASON FOR CONSULTATION:  The patient is seen in pulmonary consultation at the 

request of Dr. Marin for acute hypoxemic respiratory failure secondary to 

angioedema, suspect lisinopril.



HISTORY OF PRESENT ILLNESS:  The patient is a 55-year-old with a history of 

hypertension, presented to the Emergency Department with complaint of lip 

swelling which began morning of admission.  He was recently discharged from 

[facility] with elevation of blood pressure.  He was initiated on

lisinopril, Toprol and amlodipine.



The patient had a previous echocardiogram revealing ejection fraction 35-40%.  

He was taking his medication and woke up with some noted lip swelling and some 

facial swelling.  He was admitted to the Intensive Care Unit, started on the 

usual Solu-Medrol, antihistamine and was being monitored.



His course deteriorated earlier this morning.  I was called with increasing 

respiratory distress.  I instructed the nurse to proceed with intubation. 

Anesthesia was called.



Please see anesthesia's notes for detail.  Eventually, the patient was unable to

be orally intubated.  He underwent percutaneous tracheostomy by Dr. Roca.  He 

is currently sedated in the Intensive Care Unit.  Vital signs are currently 

stable.  He is on assist control ventilation.



His wife is at the bedside.  I updated her on his condition and informed her 

that he should do well and possibly be able to be disconnected from mechanical 

support in 2-3 days.  His arterial blood gas revealed a pH of 7.34, PaCO2 of 45,

pO2 of 517.



PAST MEDICAL HISTORY:  Hypertension, COPD, gastroesophageal reflux.



PAST SURGICAL HISTORY:  No recent surgeries.



ALLERGIES:  LISINOPRIL.



REVIEW OF SYSTEMS:  Unobtainable secondary to the patient's condition.



SOCIAL HISTORY:  I suspect, the patient smokes.



PHYSICAL EXAMINATION:

VITAL SIGNS:  Stable.  T-max yesterday 99.4.

HEENT:  Eyes:  The sclerae were nonicteric.

NECK:  Jugular venous distention was not elevated.  No lymphadenopathy.  

Tracheostomy placement and trach in place.

CHEST:  Full expansion.

LUNGS:  Adequate flow with no wheezes.

HEART:  Regular rate and rhythm with S1, S2, no S3.

ABDOMEN:  Soft, nontender.

EXTREMITIES:  No clubbing, cyanosis, no edema.



DIAGNOSTIC DATA:  Chest x-ray reviewed.  There is tracheostomy tube in place.  

There are dense right upper lobe opacities, which are chronic.  There are 

increased lung markings compatible with emphysema and some hazy opacities in the

right lung due to atelectasis.



LABORATORY DATA:  Labs were reviewed.  ABG as indicated above.  Electrolytes 

were noted.  BUN and creatinine were normal.  Glucose was elevated.



ASSESSMENT:

1.  Acute respiratory failure secondary to angioedema from lisinopril.

2.  ACE inhibitor induced angioedema.

3.  Hypertension.

4.  Chronic obstructive pulmonary disease.

5.  Abnormal chest x-ray revealing opacity, chronic in the right upper lobe, 

will review previous radiographic studies. If the patient has not had a CT in 

the past 12 months, we will obtain CT chest.



PLAN:

1.  Continue current support, assist control ventilation.

2.  Possible CT chest.

3.  Continue support with nebulized treatments.

4.  Solu-Medrol.

5.  DVT prophylaxis.

6.  Diphenhydramine.



I do appreciate the privilege in sharing in the patient's care.



Total cumulative critical care time of 45 minutes.







CARRI

DR: [place]   DD: 06/09/2021 11:40

DT: 06/09/2021 23:55   TID: [id number]

## 2021-06-10 VITALS — SYSTOLIC BLOOD PRESSURE: 117 MMHG | DIASTOLIC BLOOD PRESSURE: 89 MMHG

## 2021-06-10 VITALS — SYSTOLIC BLOOD PRESSURE: 137 MMHG | DIASTOLIC BLOOD PRESSURE: 93 MMHG

## 2021-06-10 VITALS — DIASTOLIC BLOOD PRESSURE: 88 MMHG | SYSTOLIC BLOOD PRESSURE: 130 MMHG

## 2021-06-10 VITALS — SYSTOLIC BLOOD PRESSURE: 147 MMHG | DIASTOLIC BLOOD PRESSURE: 100 MMHG

## 2021-06-10 VITALS — SYSTOLIC BLOOD PRESSURE: 124 MMHG | DIASTOLIC BLOOD PRESSURE: 90 MMHG

## 2021-06-10 VITALS — SYSTOLIC BLOOD PRESSURE: 163 MMHG | DIASTOLIC BLOOD PRESSURE: 105 MMHG

## 2021-06-10 VITALS — SYSTOLIC BLOOD PRESSURE: 137 MMHG | DIASTOLIC BLOOD PRESSURE: 95 MMHG

## 2021-06-10 VITALS — DIASTOLIC BLOOD PRESSURE: 88 MMHG | SYSTOLIC BLOOD PRESSURE: 119 MMHG

## 2021-06-10 VITALS — DIASTOLIC BLOOD PRESSURE: 92 MMHG | SYSTOLIC BLOOD PRESSURE: 133 MMHG

## 2021-06-10 VITALS — DIASTOLIC BLOOD PRESSURE: 96 MMHG | SYSTOLIC BLOOD PRESSURE: 135 MMHG

## 2021-06-10 VITALS — SYSTOLIC BLOOD PRESSURE: 121 MMHG | DIASTOLIC BLOOD PRESSURE: 93 MMHG

## 2021-06-10 VITALS — DIASTOLIC BLOOD PRESSURE: 94 MMHG | SYSTOLIC BLOOD PRESSURE: 147 MMHG

## 2021-06-10 VITALS — DIASTOLIC BLOOD PRESSURE: 100 MMHG | SYSTOLIC BLOOD PRESSURE: 148 MMHG

## 2021-06-10 VITALS — SYSTOLIC BLOOD PRESSURE: 133 MMHG | DIASTOLIC BLOOD PRESSURE: 92 MMHG

## 2021-06-10 VITALS — SYSTOLIC BLOOD PRESSURE: 132 MMHG | DIASTOLIC BLOOD PRESSURE: 93 MMHG

## 2021-06-10 VITALS — DIASTOLIC BLOOD PRESSURE: 96 MMHG | SYSTOLIC BLOOD PRESSURE: 140 MMHG

## 2021-06-10 VITALS — SYSTOLIC BLOOD PRESSURE: 166 MMHG | DIASTOLIC BLOOD PRESSURE: 103 MMHG

## 2021-06-10 VITALS — DIASTOLIC BLOOD PRESSURE: 98 MMHG | SYSTOLIC BLOOD PRESSURE: 154 MMHG

## 2021-06-10 VITALS — DIASTOLIC BLOOD PRESSURE: 105 MMHG | SYSTOLIC BLOOD PRESSURE: 158 MMHG

## 2021-06-10 VITALS — SYSTOLIC BLOOD PRESSURE: 168 MMHG | DIASTOLIC BLOOD PRESSURE: 99 MMHG

## 2021-06-10 VITALS — DIASTOLIC BLOOD PRESSURE: 92 MMHG | SYSTOLIC BLOOD PRESSURE: 119 MMHG

## 2021-06-10 VITALS — DIASTOLIC BLOOD PRESSURE: 87 MMHG | SYSTOLIC BLOOD PRESSURE: 117 MMHG

## 2021-06-10 VITALS — SYSTOLIC BLOOD PRESSURE: 167 MMHG | DIASTOLIC BLOOD PRESSURE: 102 MMHG

## 2021-06-10 VITALS — DIASTOLIC BLOOD PRESSURE: 91 MMHG | SYSTOLIC BLOOD PRESSURE: 117 MMHG

## 2021-06-10 LAB
% BANDS: 6 % (ref 0–9)
% LYMPHS: 2 % (ref 24–48)
% MONOS: 6 % (ref 0–10)
% SEGS: 86 % (ref 35–66)
ANION GAP SERPL CALC-SCNC: 10 MMOL/L (ref 6–14)
BASE EXCESS ABG: 2 MMOL/L (ref -3–3)
BASOPHILS # BLD AUTO: 0 X10^3/UL (ref 0–0.2)
BASOPHILS NFR BLD: 0 % (ref 0–3)
BUN SERPL-MCNC: 12 MG/DL (ref 8–26)
CALCIUM SERPL-MCNC: 9.6 MG/DL (ref 8.5–10.1)
CHLORIDE SERPL-SCNC: 104 MMOL/L (ref 98–107)
CO2 SERPL-SCNC: 29 MMOL/L (ref 21–32)
CREAT SERPL-MCNC: 0.7 MG/DL (ref 0.7–1.3)
EOSINOPHIL NFR BLD: 0 % (ref 0–3)
EOSINOPHIL NFR BLD: 0 X10^3/UL (ref 0–0.7)
ERYTHROCYTE [DISTWIDTH] IN BLOOD BY AUTOMATED COUNT: 14.4 % (ref 11.5–14.5)
GFR SERPLBLD BASED ON 1.73 SQ M-ARVRAT: 140.7 ML/MIN
GLUCOSE SERPL-MCNC: 136 MG/DL (ref 70–99)
HCO3 BLDA-SCNC: 26 MMOL/L (ref 21–28)
HCT VFR BLD CALC: 40.1 % (ref 39–53)
HGB BLD-MCNC: 13.6 G/DL (ref 13–17.5)
INSPIRATION SETTING TIME VENT: (no result)
LYMPHOCYTES # BLD: 0.4 X10^3/UL (ref 1–4.8)
LYMPHOCYTES NFR BLD AUTO: 3 % (ref 24–48)
MCH RBC QN AUTO: 33 PG (ref 25–35)
MCHC RBC AUTO-ENTMCNC: 34 G/DL (ref 31–37)
MCV RBC AUTO: 96 FL (ref 79–100)
MONO #: 0.4 X10^3/UL (ref 0–1.1)
MONOCYTES NFR BLD: 4 % (ref 0–9)
NEUT #: 10.4 X10^3/UL (ref 1.8–7.7)
NEUTROPHILS NFR BLD AUTO: 93 % (ref 31–73)
PCO2 BLDA: 40 MMHG (ref 35–46)
PLATELET # BLD AUTO: 126 X10^3/UL (ref 140–400)
PLATELET # BLD EST: ADEQUATE 10*3/UL
PO2 BLDA: 192 MMHG (ref 75–108)
POTASSIUM SERPL-SCNC: 3.9 MMOL/L (ref 3.5–5.1)
RBC # BLD AUTO: 4.18 X10^6/UL (ref 4.3–5.7)
SAO2 % BLDA: 100 % (ref 92–99)
SODIUM SERPL-SCNC: 143 MMOL/L (ref 136–145)
WBC # BLD AUTO: 11.2 X10^3/UL (ref 4–11)

## 2021-06-10 RX ADMIN — INSULIN LISPRO SCH UNITS: 100 INJECTION, SOLUTION INTRAVENOUS; SUBCUTANEOUS at 00:00

## 2021-06-10 RX ADMIN — METHYLPREDNISOLONE SODIUM SUCCINATE SCH MG: 125 INJECTION, POWDER, FOR SOLUTION INTRAMUSCULAR; INTRAVENOUS at 18:14

## 2021-06-10 RX ADMIN — METHYLPREDNISOLONE SODIUM SUCCINATE SCH MG: 125 INJECTION, POWDER, FOR SOLUTION INTRAMUSCULAR; INTRAVENOUS at 08:44

## 2021-06-10 RX ADMIN — IPRATROPIUM BROMIDE AND ALBUTEROL SULFATE SCH ML: .5; 3 SOLUTION RESPIRATORY (INHALATION) at 15:38

## 2021-06-10 RX ADMIN — PROPOFOL PRN MLS/HR: 10 INJECTION, EMULSION INTRAVENOUS at 17:22

## 2021-06-10 RX ADMIN — IPRATROPIUM BROMIDE AND ALBUTEROL SULFATE SCH ML: .5; 3 SOLUTION RESPIRATORY (INHALATION) at 10:58

## 2021-06-10 RX ADMIN — FAMOTIDINE SCH MG: 10 INJECTION, SOLUTION INTRAVENOUS at 08:44

## 2021-06-10 RX ADMIN — INSULIN LISPRO SCH UNITS: 100 INJECTION, SOLUTION INTRAVENOUS; SUBCUTANEOUS at 12:00

## 2021-06-10 RX ADMIN — FAMOTIDINE SCH MG: 10 INJECTION, SOLUTION INTRAVENOUS at 20:48

## 2021-06-10 RX ADMIN — PROPOFOL PRN MLS/HR: 10 INJECTION, EMULSION INTRAVENOUS at 07:36

## 2021-06-10 RX ADMIN — IPRATROPIUM BROMIDE AND ALBUTEROL SULFATE SCH ML: .5; 3 SOLUTION RESPIRATORY (INHALATION) at 07:12

## 2021-06-10 RX ADMIN — METHYLPREDNISOLONE SODIUM SUCCINATE SCH MG: 125 INJECTION, POWDER, FOR SOLUTION INTRAMUSCULAR; INTRAVENOUS at 05:41

## 2021-06-10 RX ADMIN — INSULIN LISPRO SCH UNITS: 100 INJECTION, SOLUTION INTRAVENOUS; SUBCUTANEOUS at 18:00

## 2021-06-10 RX ADMIN — IPRATROPIUM BROMIDE AND ALBUTEROL SULFATE SCH ML: .5; 3 SOLUTION RESPIRATORY (INHALATION) at 20:15

## 2021-06-10 RX ADMIN — INSULIN LISPRO SCH UNITS: 100 INJECTION, SOLUTION INTRAVENOUS; SUBCUTANEOUS at 06:00

## 2021-06-10 RX ADMIN — ENOXAPARIN SODIUM SCH MG: 40 INJECTION SUBCUTANEOUS at 20:48

## 2021-06-10 RX ADMIN — INSULIN GLARGINE SCH UNIT: 100 INJECTION, SOLUTION SUBCUTANEOUS at 20:49

## 2021-06-10 RX ADMIN — GLYCERIN, ISOLEUCINE, LEUCINE, LYSINE, METHIONINE, PHENYLALANINE, THREONINE, TRYPTOPHAN, VALINE, ALANINE, GLYCINE, ARGININE, HISTIDINE, PROLINE, SERINE, CYSTEINE, SODIUM ACETATE, MAGNESIUM ACETATE, CALCIUM ACETATE, SODIUM CHLORIDE, POTASSIUM CHLORIDE, PHOSPHORIC ACID, AND POTASSIUM METABISULFITE SCH MLS/HR
3; .21; .27; .22; .16; .17; .12; .046; .2; .21; .42; .29; .085; .34; .18; .014; .2; .054; .026; .12; .15; .041 INJECTION INTRAVENOUS at 15:40

## 2021-06-10 NOTE — NUR
SS following up with discharge planning. SS reviewed pt chart and discussed with pt RN. Pt 
is currently on the vent at 30%. Pt on Propofol and Fentanyl. Trach in place. Pt on PPN. 
Medical staff will attempt to wake up tomorrow. SS will continue to follow for discharge 
planning.

## 2021-06-10 NOTE — PDOC
SURGICAL PROGRESS NOTE


DATE: 6/10/21 


TIME: 08:42


Subjective


vent


d/w nursing


Vital Signs





Vital Signs








  Date Time  Temp Pulse Resp B/P (MAP) Pulse Ox O2 Delivery O2 Flow Rate FiO2


 


6/10/21 08:09  88 14 168/99 (122) 100 Ventilator  


 


6/10/21 08:05       3.0 


 


6/10/21 07:28 98.0       





 98.0       








I&O











Intake and Output 


 


 6/10/21





 07:00


 


Intake Total 559.77 ml


 


Output Total 2015 ml


 


Balance -1455.23 ml


 


 


 


IV Total 430.77 ml


 


Blood Product IV Normal Saline Flush 20 ml


 


Other 109 ml


 


Output Urine Total 2015 ml








General:  No acute distress


HEENT:  Other (trach intact, no active bleeding )


Labs





Laboratory Tests








Test


 6/8/21


14:39 6/9/21


07:28 6/9/21


07:33 6/9/21


08:35


 


White Blood Count


 4.0 x10^3/uL


(4.0-11.0) 


 


 8.1 x10^3/uL


(4.0-11.0)


 


Red Blood Count


 4.06 x10^6/uL


(4.30-5.70) 


 


 3.74 x10^6/uL


(4.30-5.70)


 


Hemoglobin


 13.4 g/dL


(13.0-17.5) 


 


 12.2 g/dL


(13.0-17.5)


 


Hematocrit


 38.5 %


(39.0-53.0) 


 


 35.8 %


(39.0-53.0)


 


Mean Corpuscular Volume 95 fL ()    96 fL () 


 


Mean Corpuscular Hemoglobin 33 pg (25-35)    33 pg (25-35) 


 


Mean Corpuscular Hemoglobin


Concent 35 g/dL


(31-37) 


 


 34 g/dL


(31-37)


 


Red Cell Distribution Width


 13.8 %


(11.5-14.5) 


 


 13.6 %


(11.5-14.5)


 


Platelet Count


 154 x10^3/uL


(140-400) 


 


 124 x10^3/uL


(140-400)


 


Neutrophils (%) (Auto) 52 % (31-73)    96 % (31-73) 


 


Lymphocytes (%) (Auto) 27 % (24-48)    1 % (24-48) 


 


Monocytes (%) (Auto) 17 % (0-9)    3 % (0-9) 


 


Eosinophils (%) (Auto) 2 % (0-3)    0 % (0-3) 


 


Basophils (%) (Auto) 1 % (0-3)    0 % (0-3) 


 


Neutrophils # (Auto)


 2.1 x10^3/uL


(1.8-7.7) 


 


 7.8 x10^3/uL


(1.8-7.7)


 


Lymphocytes # (Auto)


 1.1 x10^3/uL


(1.0-4.8) 


 


 0.1 x10^3/uL


(1.0-4.8)


 


Monocytes # (Auto)


 0.7 x10^3/uL


(0.0-1.1) 


 


 0.2 x10^3/uL


(0.0-1.1)


 


Eosinophils # (Auto)


 0.1 x10^3/uL


(0.0-0.7) 


 


 0.0 x10^3/uL


(0.0-0.7)


 


Basophils # (Auto)


 0.0 x10^3/uL


(0.0-0.2) 


 


 0.0 x10^3/uL


(0.0-0.2)


 


Sodium Level


 137 mmol/L


(136-145) 


 


 136 mmol/L


(136-145)


 


Potassium Level


 3.9 mmol/L


(3.5-5.1) 


 


 4.3 mmol/L


(3.5-5.1)


 


Chloride Level


 99 mmol/L


() 


 


 100 mmol/L


()


 


Carbon Dioxide Level


 27 mmol/L


(21-32) 


 


 25 mmol/L


(21-32)


 


Anion Gap 11 (6-14)    11 (6-14) 


 


Blood Urea Nitrogen


 6 mg/dL (8-26) 


 


 


 14 mg/dL


(8-26)


 


Creatinine


 0.6 mg/dL


(0.7-1.3) 


 


 0.7 mg/dL


(0.7-1.3)


 


Estimated GFR


(Cockcroft-Gault) 168.0 


 


 


 140.7 





 


Glucose Level


 83 mg/dL


(70-99) 


 


 282 mg/dL


(70-99)


 


Calcium Level


 8.5 mg/dL


(8.5-10.1) 


 


 8.5 mg/dL


(8.5-10.1)


 


O2 Saturation


 


 > 100 %


(92-99) 


 





 


Arterial Blood pH


 


 7.34


(7.35-7.45) 


 





 


Arterial Blood pCO2 at


Patient Temp 


 45 mmHg


(35-46) 


 





 


Arterial Blood pO2 at Patient


Temp 


 517 mmHg


() 


 





 


Arterial Blood HCO3


 


 23 mmol/L


(21-28) 


 





 


Arterial Blood Base Excess


 


 -3 mmol/L


(-3-3) 


 





 


FiO2  100   


 


Glucose (Fingerstick)


 


 


 292 mg/dL


(70-99) 





 


Test


 6/9/21


12:17 6/9/21


17:14 6/10/21


01:32 6/10/21


07:05


 


Glucose (Fingerstick)


 226 mg/dL


(70-99) 148 mg/dL


(70-99) 142 mg/dL


(70-99) 





 


White Blood Count


 


 


 


 11.2 x10^3/uL


(4.0-11.0)


 


Red Blood Count


 


 


 


 4.18 x10^6/uL


(4.30-5.70)


 


Hemoglobin


 


 


 


 13.6 g/dL


(13.0-17.5)


 


Hematocrit


 


 


 


 40.1 %


(39.0-53.0)


 


Mean Corpuscular Volume    96 fL () 


 


Mean Corpuscular Hemoglobin    33 pg (25-35) 


 


Mean Corpuscular Hemoglobin


Concent 


 


 


 34 g/dL


(31-37)


 


Red Cell Distribution Width


 


 


 


 14.4 %


(11.5-14.5)


 


Platelet Count


 


 


 


 126 x10^3/uL


(140-400)


 


Neutrophils (%) (Auto)    93 % (31-73) 


 


Lymphocytes (%) (Auto)    3 % (24-48) 


 


Monocytes (%) (Auto)    4 % (0-9) 


 


Eosinophils (%) (Auto)    0 % (0-3) 


 


Basophils (%) (Auto)    0 % (0-3) 


 


Neutrophils # (Auto)


 


 


 


 10.4 x10^3/uL


(1.8-7.7)


 


Lymphocytes # (Auto)


 


 


 


 0.4 x10^3/uL


(1.0-4.8)


 


Monocytes # (Auto)


 


 


 


 0.4 x10^3/uL


(0.0-1.1)


 


Eosinophils # (Auto)


 


 


 


 0.0 x10^3/uL


(0.0-0.7)


 


Basophils # (Auto)


 


 


 


 0.0 x10^3/uL


(0.0-0.2)


 


Sodium Level


 


 


 


 143 mmol/L


(136-145)


 


Potassium Level


 


 


 


 3.9 mmol/L


(3.5-5.1)


 


Chloride Level


 


 


 


 104 mmol/L


()


 


Carbon Dioxide Level


 


 


 


 29 mmol/L


(21-32)


 


Anion Gap    10 (6-14) 


 


Blood Urea Nitrogen


 


 


 


 12 mg/dL


(8-26)


 


Creatinine


 


 


 


 0.7 mg/dL


(0.7-1.3)


 


Estimated GFR


(Cockcroft-Gault) 


 


 


 140.7 





 


Glucose Level


 


 


 


 136 mg/dL


(70-99)


 


Calcium Level


 


 


 


 9.6 mg/dL


(8.5-10.1)


 


Test


 6/10/21


08:00 


 


 





 


O2 Saturation 100 % (92-99)    


 


Arterial Blood pH


 7.43


(7.35-7.45) 


 


 





 


Arterial Blood pCO2 at


Patient Temp 40 mmHg


(35-46) 


 


 





 


Arterial Blood pO2 at Patient


Temp 192 mmHg


() 


 


 





 


Arterial Blood HCO3


 26 mmol/L


(21-28) 


 


 





 


Arterial Blood Base Excess


 2 mmol/L


(-3-3) 


 


 





 


FiO2 40% vent    








Laboratory Tests








Test


 6/9/21


12:17 6/9/21


17:14 6/10/21


01:32 6/10/21


07:05


 


Glucose (Fingerstick)


 226 mg/dL


(70-99) 148 mg/dL


(70-99) 142 mg/dL


(70-99) 





 


White Blood Count


 


 


 


 11.2 x10^3/uL


(4.0-11.0)


 


Red Blood Count


 


 


 


 4.18 x10^6/uL


(4.30-5.70)


 


Hemoglobin


 


 


 


 13.6 g/dL


(13.0-17.5)


 


Hematocrit


 


 


 


 40.1 %


(39.0-53.0)


 


Mean Corpuscular Volume    96 fL () 


 


Mean Corpuscular Hemoglobin    33 pg (25-35) 


 


Mean Corpuscular Hemoglobin


Concent 


 


 


 34 g/dL


(31-37)


 


Red Cell Distribution Width


 


 


 


 14.4 %


(11.5-14.5)


 


Platelet Count


 


 


 


 126 x10^3/uL


(140-400)


 


Neutrophils (%) (Auto)    93 % (31-73) 


 


Lymphocytes (%) (Auto)    3 % (24-48) 


 


Monocytes (%) (Auto)    4 % (0-9) 


 


Eosinophils (%) (Auto)    0 % (0-3) 


 


Basophils (%) (Auto)    0 % (0-3) 


 


Neutrophils # (Auto)


 


 


 


 10.4 x10^3/uL


(1.8-7.7)


 


Lymphocytes # (Auto)


 


 


 


 0.4 x10^3/uL


(1.0-4.8)


 


Monocytes # (Auto)


 


 


 


 0.4 x10^3/uL


(0.0-1.1)


 


Eosinophils # (Auto)


 


 


 


 0.0 x10^3/uL


(0.0-0.7)


 


Basophils # (Auto)


 


 


 


 0.0 x10^3/uL


(0.0-0.2)


 


Sodium Level


 


 


 


 143 mmol/L


(136-145)


 


Potassium Level


 


 


 


 3.9 mmol/L


(3.5-5.1)


 


Chloride Level


 


 


 


 104 mmol/L


()


 


Carbon Dioxide Level


 


 


 


 29 mmol/L


(21-32)


 


Anion Gap    10 (6-14) 


 


Blood Urea Nitrogen


 


 


 


 12 mg/dL


(8-26)


 


Creatinine


 


 


 


 0.7 mg/dL


(0.7-1.3)


 


Estimated GFR


(Cockcroft-Gault) 


 


 


 140.7 





 


Glucose Level


 


 


 


 136 mg/dL


(70-99)


 


Calcium Level


 


 


 


 9.6 mg/dL


(8.5-10.1)


 


Test


 6/10/21


08:00 


 


 





 


O2 Saturation 100 % (92-99)    


 


Arterial Blood pH


 7.43


(7.35-7.45) 


 


 





 


Arterial Blood pCO2 at


Patient Temp 40 mmHg


(35-46) 


 


 





 


Arterial Blood pO2 at Patient


Temp 192 mmHg


() 


 


 





 


Arterial Blood HCO3


 26 mmol/L


(21-28) 


 


 





 


Arterial Blood Base Excess


 2 mmol/L


(-3-3) 


 


 





 


FiO2 40% vent    








Problem List


Problems


Medical Problems:


(1) ACE inhibitor-aggravated angioedema


Status: Acute  








Assessment/Plan


trach stable


will sign off, please call with questions





Justicifation of Admission Dx:


Justifications for Admission:


Justification of Admission Dx:  Yes











CHRISTIE PUGA APRN            Kirill 10, 2021 08:43

## 2021-06-10 NOTE — PDOC
[facility] PROGRESS NOTE


Date of Service


DOS:


DATE: 6/10/21 


TIME: 16:41





Chief Complaint


Chief Complaint


Angioedema


Systolic CHF


Hypertension





Plan:


Patient seen epinephrine and Solu-Medrol in ED with noted improvement


We will continue treatment with Benadryl 25 mg every 4 hr , Solu-Medrol 60 mg 

every 6 hours, epinephrine IM as needed


If symptoms are worsening or not improving with epinephrine may provide 

tranexamic acid 1 g IV


Patient will need to discontinue lisinopril upon discharge and be prescribed 

losartan due to systolic CHF with ejection fraction 35-40%;


Admit to ICU for monitoring and hopefully discharge tomorrow when noted 

improvement


FEN - Cardiac diet as tolerated


PPX  - Lovenox


FULL CODE


Dispo - admit to ICU for further monitoring





History of Present Illness


History of Present Illness


Patient is a 55-year-old male past medical history hypertension, who presents to

the ED with complaints of lip swelling that began this morning.  Patient was 

recently discharged from our service and during that time he had echocardiogram 

that showed systolic function is mildly to moderately impaired, with Ejection 

Fraction is 35-40%; there is global hypokinesis of the left ventricle; septal 

motion suggestive of conduction defect.  He was initiated on lisinopril, Toprol-

XL, and amlodipine. Patient reportedly took his prescribed lisinopril today and 

woke up from a nap with noted swelling to his lips and left side of his face.  

He initially denied any history of similar symptoms to the ED attending, but 

when family was present she did note he has a history of similar reaction to his

face and lips about 2-3 years ago.  This reaction responded with medications and

they never did figure out what the cause of this reaction was at that time.  

Denies any family history of angioedema.  Treated with Solu-Medrol and some 

epinephrine in the ER.  Will admit patient for monitoring overnight





6/9/2021: Due to concerns of swelling to tongue and compromised airway, 

anesthesia was consulted to help perform tracheostomy.  Patient currently on 

vent, FiO2 40%, PEEP 5.  Had discussion with wife at bedside, she states that 

history of similar episode occurred roughly 3 years ago and she believes this 

reaction was after taking a blood pressure medication she cannot recall.  We 

will continue supportive care.  Due to some steroid-induced hyperglycemia will 

initiate insulin treatment.  2.  Time 30 minutes spent reviewing charts, 

reviewing labs, reviewing imaging, discussion with wife bedside, discussion with

pulmonology, and discussion with RN.





6/10/2021: Afebrile, no acute overnight.  Remains on vent with FiO2 40%.  Plan 

for vacation sedation tomorrow morning.   Critical care time 30 minutes spent 

reviewing charts, reviewing labs, reviewing imaging, discussion with family, and

discussion with RN.





Vitals/I&O


Vitals/I&O:





                                   Vital Signs








  Date Time  Temp Pulse Resp B/P (MAP) Pulse Ox O2 Delivery O2 Flow Rate FiO2


 


6/10/21 15:37     100 Ventilator  


 


6/10/21 15:16  67 14 147/100 (116)    


 


6/10/21 14:55       3.0 


 


6/10/21 12:08 97.9       





 97.9       














                                    I & O   


 


 6/9/21 6/9/21 6/10/21





 14:53 22:53 06:53


 


Intake Total  403.27 ml 156.5 ml


 


Output Total 1050 ml 725 ml 240 ml


 


Balance -1050 ml -321.73 ml -83.5 ml











Physical Exam


General:  No acute distress


Heart:  Regular rate


Lungs:  Other (Coarse breath sounds)


Abdomen:  Normal bowel sounds, Soft


Extremities:  No clubbing, No cyanosis


Skin:  No rashes, No breakdown, Other (Tracheostomy in place)





Labs


Labs:





Laboratory Tests








Test


 6/9/21


17:14 6/10/21


01:32 6/10/21


07:05 6/10/21


08:00


 


Glucose (Fingerstick)


 148 mg/dL


(70-99) 142 mg/dL


(70-99) 


 





 


White Blood Count


 


 


 11.2 x10^3/uL


(4.0-11.0) 





 


Red Blood Count


 


 


 4.18 x10^6/uL


(4.30-5.70) 





 


Hemoglobin


 


 


 13.6 g/dL


(13.0-17.5) 





 


Hematocrit


 


 


 40.1 %


(39.0-53.0) 





 


Mean Corpuscular Volume   96 fL ()  


 


Mean Corpuscular Hemoglobin   33 pg (25-35)  


 


Mean Corpuscular Hemoglobin


Concent 


 


 34 g/dL


(31-37) 





 


Red Cell Distribution Width


 


 


 14.4 %


(11.5-14.5) 





 


Platelet Count


 


 


 126 x10^3/uL


(140-400) 





 


Neutrophils (%) (Auto)   93 % (31-73)  


 


Lymphocytes (%) (Auto)   3 % (24-48)  


 


Monocytes (%) (Auto)   4 % (0-9)  


 


Eosinophils (%) (Auto)   0 % (0-3)  


 


Basophils (%) (Auto)   0 % (0-3)  


 


Neutrophils # (Auto)


 


 


 10.4 x10^3/uL


(1.8-7.7) 





 


Lymphocytes # (Auto)


 


 


 0.4 x10^3/uL


(1.0-4.8) 





 


Monocytes # (Auto)


 


 


 0.4 x10^3/uL


(0.0-1.1) 





 


Eosinophils # (Auto)


 


 


 0.0 x10^3/uL


(0.0-0.7) 





 


Basophils # (Auto)


 


 


 0.0 x10^3/uL


(0.0-0.2) 





 


Segmented Neutrophils %   86 % (35-66)  


 


Band Neutrophils %   6 % (0-9)  


 


Lymphocytes %   2 % (24-48)  


 


Monocytes %   6 % (0-10)  


 


Platelet Estimate


 


 


 Adequate


(ADEQUATE) 





 


Sodium Level


 


 


 143 mmol/L


(136-145) 





 


Potassium Level


 


 


 3.9 mmol/L


(3.5-5.1) 





 


Chloride Level


 


 


 104 mmol/L


() 





 


Carbon Dioxide Level


 


 


 29 mmol/L


(21-32) 





 


Anion Gap   10 (6-14)  


 


Blood Urea Nitrogen


 


 


 12 mg/dL


(8-26) 





 


Creatinine


 


 


 0.7 mg/dL


(0.7-1.3) 





 


Estimated GFR


(Cockcroft-Gault) 


 


 140.7 


 





 


Glucose Level


 


 


 136 mg/dL


(70-99) 





 


Calcium Level


 


 


 9.6 mg/dL


(8.5-10.1) 





 


O2 Saturation    100 % (92-99) 


 


Arterial Blood pH


 


 


 


 7.43


(7.35-7.45)


 


Arterial Blood pCO2 at


Patient Temp 


 


 


 40 mmHg


(35-46)


 


Arterial Blood pO2 at Patient


Temp 


 


 


 192 mmHg


()


 


Arterial Blood HCO3


 


 


 


 26 mmol/L


(21-28)


 


Arterial Blood Base Excess


 


 


 


 2 mmol/L


(-3-3)


 


FiO2    40% vent 











Assessment and Plan


Assessmemt and Plan


Problems


Medical Problems:


(1) ACE inhibitor-aggravated angioedema


Status: Acute  











Comment


Review of Relevant


I have reviewed the following items elizabeth (where applicable) has been applied.


Medications:





Current Medications








 Medications


  (Trade)  Dose


 Ordered  Sig/Yg


 Route


 PRN Reason  Start Time


 Stop Time Status Last Admin


Dose Admin


 


 Amino Acids/


 Glycerin/


 Electrolytes  1,000 ml @ 


 80 mls/hr  [id number]


 IV


   6/10/21 16:00


    6/10/21 15:40














Justifications for Admission


Other Justification


Angioedema











ROSALINO WREN MD            Kirill 10, 2021 16:36

## 2021-06-10 NOTE — PDOC
PULMONARY PROGRESS NOTES


DATE: 6/10/21 


TIME: 09:50


Subjective


Pt. remains on vent support 40%


sedated


Vitals





Vital Signs








  Date Time  Temp Pulse Resp B/P (MAP) Pulse Ox O2 Delivery O2 Flow Rate FiO2


 


6/10/21 08:54  71 14 135/96 (109) 100 Ventilator  


 


6/10/21 08:05       3.0 


 


6/10/21 07:28 98.0       





 98.0       








Lungs:  Other (Coarse breath sounds)


Cardiovascular:  S1


Abdomen:  Soft


Extremities:  No Edema


Labs





Laboratory Tests








Test


 6/8/21


14:39 6/9/21


07:28 6/9/21


07:33 6/9/21


08:35


 


White Blood Count


 4.0 x10^3/uL


(4.0-11.0) 


 


 8.1 x10^3/uL


(4.0-11.0)


 


Red Blood Count


 4.06 x10^6/uL


(4.30-5.70) 


 


 3.74 x10^6/uL


(4.30-5.70)


 


Hemoglobin


 13.4 g/dL


(13.0-17.5) 


 


 12.2 g/dL


(13.0-17.5)


 


Hematocrit


 38.5 %


(39.0-53.0) 


 


 35.8 %


(39.0-53.0)


 


Mean Corpuscular Volume 95 fL ()    96 fL () 


 


Mean Corpuscular Hemoglobin 33 pg (25-35)    33 pg (25-35) 


 


Mean Corpuscular Hemoglobin


Concent 35 g/dL


(31-37) 


 


 34 g/dL


(31-37)


 


Red Cell Distribution Width


 13.8 %


(11.5-14.5) 


 


 13.6 %


(11.5-14.5)


 


Platelet Count


 154 x10^3/uL


(140-400) 


 


 124 x10^3/uL


(140-400)


 


Neutrophils (%) (Auto) 52 % (31-73)    96 % (31-73) 


 


Lymphocytes (%) (Auto) 27 % (24-48)    1 % (24-48) 


 


Monocytes (%) (Auto) 17 % (0-9)    3 % (0-9) 


 


Eosinophils (%) (Auto) 2 % (0-3)    0 % (0-3) 


 


Basophils (%) (Auto) 1 % (0-3)    0 % (0-3) 


 


Neutrophils # (Auto)


 2.1 x10^3/uL


(1.8-7.7) 


 


 7.8 x10^3/uL


(1.8-7.7)


 


Lymphocytes # (Auto)


 1.1 x10^3/uL


(1.0-4.8) 


 


 0.1 x10^3/uL


(1.0-4.8)


 


Monocytes # (Auto)


 0.7 x10^3/uL


(0.0-1.1) 


 


 0.2 x10^3/uL


(0.0-1.1)


 


Eosinophils # (Auto)


 0.1 x10^3/uL


(0.0-0.7) 


 


 0.0 x10^3/uL


(0.0-0.7)


 


Basophils # (Auto)


 0.0 x10^3/uL


(0.0-0.2) 


 


 0.0 x10^3/uL


(0.0-0.2)


 


Sodium Level


 137 mmol/L


(136-145) 


 


 136 mmol/L


(136-145)


 


Potassium Level


 3.9 mmol/L


(3.5-5.1) 


 


 4.3 mmol/L


(3.5-5.1)


 


Chloride Level


 99 mmol/L


() 


 


 100 mmol/L


()


 


Carbon Dioxide Level


 27 mmol/L


(21-32) 


 


 25 mmol/L


(21-32)


 


Anion Gap 11 (6-14)    11 (6-14) 


 


Blood Urea Nitrogen


 6 mg/dL (8-26) 


 


 


 14 mg/dL


(8-26)


 


Creatinine


 0.6 mg/dL


(0.7-1.3) 


 


 0.7 mg/dL


(0.7-1.3)


 


Estimated GFR


(Cockcroft-Gault) 168.0 


 


 


 140.7 





 


Glucose Level


 83 mg/dL


(70-99) 


 


 282 mg/dL


(70-99)


 


Calcium Level


 8.5 mg/dL


(8.5-10.1) 


 


 8.5 mg/dL


(8.5-10.1)


 


O2 Saturation


 


 > 100 %


(92-99) 


 





 


Arterial Blood pH


 


 7.34


(7.35-7.45) 


 





 


Arterial Blood pCO2 at


Patient Temp 


 45 mmHg


(35-46) 


 





 


Arterial Blood pO2 at Patient


Temp 


 517 mmHg


() 


 





 


Arterial Blood HCO3


 


 23 mmol/L


(21-28) 


 





 


Arterial Blood Base Excess


 


 -3 mmol/L


(-3-3) 


 





 


FiO2  100   


 


Glucose (Fingerstick)


 


 


 292 mg/dL


(70-99) 





 


Test


 6/9/21


12:17 6/9/21


17:14 6/10/21


01:32 6/10/21


07:05


 


Glucose (Fingerstick)


 226 mg/dL


(70-99) 148 mg/dL


(70-99) 142 mg/dL


(70-99) 





 


White Blood Count


 


 


 


 11.2 x10^3/uL


(4.0-11.0)


 


Red Blood Count


 


 


 


 4.18 x10^6/uL


(4.30-5.70)


 


Hemoglobin


 


 


 


 13.6 g/dL


(13.0-17.5)


 


Hematocrit


 


 


 


 40.1 %


(39.0-53.0)


 


Mean Corpuscular Volume    96 fL () 


 


Mean Corpuscular Hemoglobin    33 pg (25-35) 


 


Mean Corpuscular Hemoglobin


Concent 


 


 


 34 g/dL


(31-37)


 


Red Cell Distribution Width


 


 


 


 14.4 %


(11.5-14.5)


 


Platelet Count


 


 


 


 126 x10^3/uL


(140-400)


 


Neutrophils (%) (Auto)    93 % (31-73) 


 


Lymphocytes (%) (Auto)    3 % (24-48) 


 


Monocytes (%) (Auto)    4 % (0-9) 


 


Eosinophils (%) (Auto)    0 % (0-3) 


 


Basophils (%) (Auto)    0 % (0-3) 


 


Neutrophils # (Auto)


 


 


 


 10.4 x10^3/uL


(1.8-7.7)


 


Lymphocytes # (Auto)


 


 


 


 0.4 x10^3/uL


(1.0-4.8)


 


Monocytes # (Auto)


 


 


 


 0.4 x10^3/uL


(0.0-1.1)


 


Eosinophils # (Auto)


 


 


 


 0.0 x10^3/uL


(0.0-0.7)


 


Basophils # (Auto)


 


 


 


 0.0 x10^3/uL


(0.0-0.2)


 


Sodium Level


 


 


 


 143 mmol/L


(136-145)


 


Potassium Level


 


 


 


 3.9 mmol/L


(3.5-5.1)


 


Chloride Level


 


 


 


 104 mmol/L


()


 


Carbon Dioxide Level


 


 


 


 29 mmol/L


(21-32)


 


Anion Gap    10 (6-14) 


 


Blood Urea Nitrogen


 


 


 


 12 mg/dL


(8-26)


 


Creatinine


 


 


 


 0.7 mg/dL


(0.7-1.3)


 


Estimated GFR


(Cockcroft-Gault) 


 


 


 140.7 





 


Glucose Level


 


 


 


 136 mg/dL


(70-99)


 


Calcium Level


 


 


 


 9.6 mg/dL


(8.5-10.1)


 


Test


 6/10/21


08:00 


 


 





 


O2 Saturation 100 % (92-99)    


 


Arterial Blood pH


 7.43


(7.35-7.45) 


 


 





 


Arterial Blood pCO2 at


Patient Temp 40 mmHg


(35-46) 


 


 





 


Arterial Blood pO2 at Patient


Temp 192 mmHg


() 


 


 





 


Arterial Blood HCO3


 26 mmol/L


(21-28) 


 


 





 


Arterial Blood Base Excess


 2 mmol/L


(-3-3) 


 


 





 


FiO2 40% vent    








Laboratory Tests








Test


 6/9/21


12:17 6/9/21


17:14 6/10/21


01:32 6/10/21


07:05


 


Glucose (Fingerstick)


 226 mg/dL


(70-99) 148 mg/dL


(70-99) 142 mg/dL


(70-99) 





 


White Blood Count


 


 


 


 11.2 x10^3/uL


(4.0-11.0)


 


Red Blood Count


 


 


 


 4.18 x10^6/uL


(4.30-5.70)


 


Hemoglobin


 


 


 


 13.6 g/dL


(13.0-17.5)


 


Hematocrit


 


 


 


 40.1 %


(39.0-53.0)


 


Mean Corpuscular Volume    96 fL () 


 


Mean Corpuscular Hemoglobin    33 pg (25-35) 


 


Mean Corpuscular Hemoglobin


Concent 


 


 


 34 g/dL


(31-37)


 


Red Cell Distribution Width


 


 


 


 14.4 %


(11.5-14.5)


 


Platelet Count


 


 


 


 126 x10^3/uL


(140-400)


 


Neutrophils (%) (Auto)    93 % (31-73) 


 


Lymphocytes (%) (Auto)    3 % (24-48) 


 


Monocytes (%) (Auto)    4 % (0-9) 


 


Eosinophils (%) (Auto)    0 % (0-3) 


 


Basophils (%) (Auto)    0 % (0-3) 


 


Neutrophils # (Auto)


 


 


 


 10.4 x10^3/uL


(1.8-7.7)


 


Lymphocytes # (Auto)


 


 


 


 0.4 x10^3/uL


(1.0-4.8)


 


Monocytes # (Auto)


 


 


 


 0.4 x10^3/uL


(0.0-1.1)


 


Eosinophils # (Auto)


 


 


 


 0.0 x10^3/uL


(0.0-0.7)


 


Basophils # (Auto)


 


 


 


 0.0 x10^3/uL


(0.0-0.2)


 


Sodium Level


 


 


 


 143 mmol/L


(136-145)


 


Potassium Level


 


 


 


 3.9 mmol/L


(3.5-5.1)


 


Chloride Level


 


 


 


 104 mmol/L


()


 


Carbon Dioxide Level


 


 


 


 29 mmol/L


(21-32)


 


Anion Gap    10 (6-14) 


 


Blood Urea Nitrogen


 


 


 


 12 mg/dL


(8-26)


 


Creatinine


 


 


 


 0.7 mg/dL


(0.7-1.3)


 


Estimated GFR


(Cockcroft-Gault) 


 


 


 140.7 





 


Glucose Level


 


 


 


 136 mg/dL


(70-99)


 


Calcium Level


 


 


 


 9.6 mg/dL


(8.5-10.1)


 


Test


 6/10/21


08:00 


 


 





 


O2 Saturation 100 % (92-99)    


 


Arterial Blood pH


 7.43


(7.35-7.45) 


 


 





 


Arterial Blood pCO2 at


Patient Temp 40 mmHg


(35-46) 


 


 





 


Arterial Blood pO2 at Patient


Temp 192 mmHg


() 


 


 





 


Arterial Blood HCO3


 26 mmol/L


(21-28) 


 


 





 


Arterial Blood Base Excess


 2 mmol/L


(-3-3) 


 


 





 


FiO2 40% vent    








Medications





Active Scripts








 Medications  Dose


 Route/Sig


 Max Daily Dose Days Date Category


 


 Metoprolol


 Succinate ( Xl )


  (Metoprolol


 Succinate) 25 Mg


 Tab.er.24h  25 Mg


 PO DAILY


   6/8/21 Reported


 


 Hydrochlorothiazide


 Tablet


  (Hydrochlorothiazide)


 12.5 Mg Tablet  12.5 Mg


 PO DAILY


   6/8/21 Reported


 


 Ventolin Hfa


 Inhaler


  (Albuterol


 Sulfate) 18 Gm


 Hfa.aer.ad  2 Puff


 INH PRN Q4HRS PRN


   8/15/19 Reported


 


 Amlodipine


 Besylate 10 Mg


 Tablet  10 Mg


 PO DAILY


  30 9/29/17 Rx











Impression


.


ASSESSMENT:


1.  Acute respiratory failure secondary to angioedema from lisinopril.


2.  ACE inhibitor induced angioedema.


3.  Hypertension.


4.  Chronic obstructive pulmonary disease.


5.  Abnormal chest x-ray revealing opacity, chronic in the right upper lobe, 


will review previous radiographic studies. If the patient has not had a CT in 


the past 12 months, we will obtain CT chest.





Plan


.


PLAN:


Continue current vent support A/C mode


plan for sedation vacation in am  


Follow CXR/ABG


Follow surgery recs--possible need for trach 


NEBS


Continue steroids and   Diphenhydramine


DVT/GI PPX 





D/W RN and RT











NADIA ANNE MD              Kirill 10, 2021 09:50

## 2021-06-11 VITALS — DIASTOLIC BLOOD PRESSURE: 98 MMHG | SYSTOLIC BLOOD PRESSURE: 147 MMHG

## 2021-06-11 VITALS — DIASTOLIC BLOOD PRESSURE: 102 MMHG | SYSTOLIC BLOOD PRESSURE: 155 MMHG

## 2021-06-11 VITALS — SYSTOLIC BLOOD PRESSURE: 142 MMHG | DIASTOLIC BLOOD PRESSURE: 109 MMHG

## 2021-06-11 VITALS — DIASTOLIC BLOOD PRESSURE: 97 MMHG | SYSTOLIC BLOOD PRESSURE: 146 MMHG

## 2021-06-11 VITALS — DIASTOLIC BLOOD PRESSURE: 100 MMHG | SYSTOLIC BLOOD PRESSURE: 161 MMHG

## 2021-06-11 VITALS — DIASTOLIC BLOOD PRESSURE: 115 MMHG | SYSTOLIC BLOOD PRESSURE: 180 MMHG

## 2021-06-11 VITALS — SYSTOLIC BLOOD PRESSURE: 156 MMHG | DIASTOLIC BLOOD PRESSURE: 100 MMHG

## 2021-06-11 VITALS — DIASTOLIC BLOOD PRESSURE: 92 MMHG | SYSTOLIC BLOOD PRESSURE: 131 MMHG

## 2021-06-11 VITALS — SYSTOLIC BLOOD PRESSURE: 121 MMHG | DIASTOLIC BLOOD PRESSURE: 90 MMHG

## 2021-06-11 VITALS — SYSTOLIC BLOOD PRESSURE: 153 MMHG | DIASTOLIC BLOOD PRESSURE: 104 MMHG

## 2021-06-11 VITALS — SYSTOLIC BLOOD PRESSURE: 155 MMHG | DIASTOLIC BLOOD PRESSURE: 101 MMHG

## 2021-06-11 VITALS — SYSTOLIC BLOOD PRESSURE: 147 MMHG | DIASTOLIC BLOOD PRESSURE: 103 MMHG

## 2021-06-11 VITALS — DIASTOLIC BLOOD PRESSURE: 97 MMHG | SYSTOLIC BLOOD PRESSURE: 135 MMHG

## 2021-06-11 VITALS — DIASTOLIC BLOOD PRESSURE: 94 MMHG | SYSTOLIC BLOOD PRESSURE: 129 MMHG

## 2021-06-11 VITALS — DIASTOLIC BLOOD PRESSURE: 95 MMHG | SYSTOLIC BLOOD PRESSURE: 149 MMHG

## 2021-06-11 VITALS — SYSTOLIC BLOOD PRESSURE: 166 MMHG | DIASTOLIC BLOOD PRESSURE: 106 MMHG

## 2021-06-11 VITALS — SYSTOLIC BLOOD PRESSURE: 126 MMHG | DIASTOLIC BLOOD PRESSURE: 87 MMHG

## 2021-06-11 VITALS — DIASTOLIC BLOOD PRESSURE: 102 MMHG | SYSTOLIC BLOOD PRESSURE: 164 MMHG

## 2021-06-11 VITALS — DIASTOLIC BLOOD PRESSURE: 97 MMHG | SYSTOLIC BLOOD PRESSURE: 145 MMHG

## 2021-06-11 VITALS — DIASTOLIC BLOOD PRESSURE: 88 MMHG | SYSTOLIC BLOOD PRESSURE: 120 MMHG

## 2021-06-11 VITALS — DIASTOLIC BLOOD PRESSURE: 103 MMHG | SYSTOLIC BLOOD PRESSURE: 158 MMHG

## 2021-06-11 VITALS — SYSTOLIC BLOOD PRESSURE: 162 MMHG | DIASTOLIC BLOOD PRESSURE: 100 MMHG

## 2021-06-11 VITALS — SYSTOLIC BLOOD PRESSURE: 145 MMHG | DIASTOLIC BLOOD PRESSURE: 102 MMHG

## 2021-06-11 LAB
ANION GAP SERPL CALC-SCNC: 7 MMOL/L (ref 6–14)
BASE EXCESS ABG: 2 MMOL/L (ref -3–3)
BASOPHILS # BLD AUTO: 0 X10^3/UL (ref 0–0.2)
BASOPHILS NFR BLD: 0 % (ref 0–3)
BUN SERPL-MCNC: 16 MG/DL (ref 8–26)
CALCIUM SERPL-MCNC: 9.4 MG/DL (ref 8.5–10.1)
CHLORIDE SERPL-SCNC: 104 MMOL/L (ref 98–107)
CO2 SERPL-SCNC: 31 MMOL/L (ref 21–32)
CREAT SERPL-MCNC: 0.6 MG/DL (ref 0.7–1.3)
EOSINOPHIL NFR BLD: 0 % (ref 0–3)
EOSINOPHIL NFR BLD: 0 X10^3/UL (ref 0–0.7)
ERYTHROCYTE [DISTWIDTH] IN BLOOD BY AUTOMATED COUNT: 14.1 % (ref 11.5–14.5)
GFR SERPLBLD BASED ON 1.73 SQ M-ARVRAT: 168 ML/MIN
GLUCOSE SERPL-MCNC: 115 MG/DL (ref 70–99)
HCO3 BLDA-SCNC: 27 MMOL/L (ref 21–28)
HCT VFR BLD CALC: 39.3 % (ref 39–53)
HGB BLD-MCNC: 13.1 G/DL (ref 13–17.5)
INSPIRATION SETTING TIME VENT: 30
LYMPHOCYTES # BLD: 0.2 X10^3/UL (ref 1–4.8)
LYMPHOCYTES NFR BLD AUTO: 2 % (ref 24–48)
MCH RBC QN AUTO: 32 PG (ref 25–35)
MCHC RBC AUTO-ENTMCNC: 33 G/DL (ref 31–37)
MCV RBC AUTO: 97 FL (ref 79–100)
MONO #: 0.7 X10^3/UL (ref 0–1.1)
MONOCYTES NFR BLD: 6 % (ref 0–9)
NEUT #: 10.6 X10^3/UL (ref 1.8–7.7)
NEUTROPHILS NFR BLD AUTO: 92 % (ref 31–73)
PCO2 BLDA: 43 MMHG (ref 35–46)
PLATELET # BLD AUTO: 103 X10^3/UL (ref 140–400)
PO2 BLDA: 84 MMHG (ref 75–108)
POTASSIUM SERPL-SCNC: 3.6 MMOL/L (ref 3.5–5.1)
RBC # BLD AUTO: 4.06 X10^6/UL (ref 4.3–5.7)
SAO2 % BLDA: 96 % (ref 92–99)
SODIUM SERPL-SCNC: 142 MMOL/L (ref 136–145)
WBC # BLD AUTO: 11.4 X10^3/UL (ref 4–11)

## 2021-06-11 RX ADMIN — METHYLPREDNISOLONE SODIUM SUCCINATE SCH MG: 125 INJECTION, POWDER, FOR SOLUTION INTRAMUSCULAR; INTRAVENOUS at 00:06

## 2021-06-11 RX ADMIN — HALOPERIDOL LACTATE PRN MG: 5 INJECTION, SOLUTION INTRAMUSCULAR at 20:50

## 2021-06-11 RX ADMIN — IPRATROPIUM BROMIDE AND ALBUTEROL SULFATE SCH ML: .5; 3 SOLUTION RESPIRATORY (INHALATION) at 07:21

## 2021-06-11 RX ADMIN — GLYCERIN, ISOLEUCINE, LEUCINE, LYSINE, METHIONINE, PHENYLALANINE, THREONINE, TRYPTOPHAN, VALINE, ALANINE, GLYCINE, ARGININE, HISTIDINE, PROLINE, SERINE, CYSTEINE, SODIUM ACETATE, MAGNESIUM ACETATE, CALCIUM ACETATE, SODIUM CHLORIDE, POTASSIUM CHLORIDE, PHOSPHORIC ACID, AND POTASSIUM METABISULFITE SCH MLS/HR
3; .21; .27; .22; .16; .17; .12; .046; .2; .21; .42; .29; .085; .34; .18; .014; .2; .054; .026; .12; .15; .041 INJECTION INTRAVENOUS at 16:33

## 2021-06-11 RX ADMIN — IPRATROPIUM BROMIDE AND ALBUTEROL SULFATE SCH ML: .5; 3 SOLUTION RESPIRATORY (INHALATION) at 11:05

## 2021-06-11 RX ADMIN — METHYLPREDNISOLONE SODIUM SUCCINATE SCH MG: 125 INJECTION, POWDER, FOR SOLUTION INTRAMUSCULAR; INTRAVENOUS at 05:47

## 2021-06-11 RX ADMIN — GLYCERIN, ISOLEUCINE, LEUCINE, LYSINE, METHIONINE, PHENYLALANINE, THREONINE, TRYPTOPHAN, VALINE, ALANINE, GLYCINE, ARGININE, HISTIDINE, PROLINE, SERINE, CYSTEINE, SODIUM ACETATE, MAGNESIUM ACETATE, CALCIUM ACETATE, SODIUM CHLORIDE, POTASSIUM CHLORIDE, PHOSPHORIC ACID, AND POTASSIUM METABISULFITE SCH MLS/HR
3; .21; .27; .22; .16; .17; .12; .046; .2; .21; .42; .29; .085; .34; .18; .014; .2; .054; .026; .12; .15; .041 INJECTION INTRAVENOUS at 03:41

## 2021-06-11 RX ADMIN — MORPHINE SULFATE PRN MG: 10 INJECTION INTRAMUSCULAR; INTRAVENOUS; SUBCUTANEOUS at 14:09

## 2021-06-11 RX ADMIN — PROPOFOL PRN MLS/HR: 10 INJECTION, EMULSION INTRAVENOUS at 00:00

## 2021-06-11 RX ADMIN — INSULIN LISPRO SCH UNITS: 100 INJECTION, SOLUTION INTRAVENOUS; SUBCUTANEOUS at 00:00

## 2021-06-11 RX ADMIN — IPRATROPIUM BROMIDE AND ALBUTEROL SULFATE SCH ML: .5; 3 SOLUTION RESPIRATORY (INHALATION) at 20:00

## 2021-06-11 RX ADMIN — PROPOFOL PRN MLS/HR: 10 INJECTION, EMULSION INTRAVENOUS at 06:36

## 2021-06-11 RX ADMIN — MORPHINE SULFATE PRN MG: 10 INJECTION INTRAMUSCULAR; INTRAVENOUS; SUBCUTANEOUS at 19:38

## 2021-06-11 RX ADMIN — FAMOTIDINE SCH MG: 10 INJECTION, SOLUTION INTRAVENOUS at 09:02

## 2021-06-11 RX ADMIN — FAMOTIDINE SCH MG: 10 INJECTION, SOLUTION INTRAVENOUS at 20:43

## 2021-06-11 RX ADMIN — ENOXAPARIN SODIUM SCH MG: 40 INJECTION SUBCUTANEOUS at 20:43

## 2021-06-11 RX ADMIN — INSULIN LISPRO SCH UNITS: 100 INJECTION, SOLUTION INTRAVENOUS; SUBCUTANEOUS at 05:42

## 2021-06-11 RX ADMIN — MORPHINE SULFATE PRN MG: 10 INJECTION INTRAMUSCULAR; INTRAVENOUS; SUBCUTANEOUS at 15:43

## 2021-06-11 RX ADMIN — IPRATROPIUM BROMIDE AND ALBUTEROL SULFATE SCH ML: .5; 3 SOLUTION RESPIRATORY (INHALATION) at 15:11

## 2021-06-11 RX ADMIN — DEXMEDETOMIDINE HYDROCHLORIDE PRN MLS/HR: 100 INJECTION, SOLUTION, CONCENTRATE INTRAVENOUS at 22:49

## 2021-06-11 NOTE — PDOC
PULMONARY PROGRESS NOTES


DATE: 6/11/21 


TIME: 08:33


Subjective


Pt. remains on vent support 30%


no overnight events


Vitals





Vital Signs








  Date Time  Temp Pulse Resp B/P (MAP) Pulse Ox O2 Delivery O2 Flow Rate FiO2


 


6/11/21 07:21     100 Ventilator  


 


6/11/21 07:14 98.5 67 14 162/100 (120)    





 98.5       


 


6/11/21 00:40       3.0 








Comments


vent


Lungs:  Other (Coarse breath sounds)


Cardiovascular:  S1


Abdomen:  Soft


Extremities:  No Edema


Labs





Laboratory Tests








Test


 6/9/21


08:35 6/9/21


12:17 6/9/21


17:14 6/10/21


01:32


 


White Blood Count


 8.1 x10^3/uL


(4.0-11.0) 


 


 





 


Red Blood Count


 3.74 x10^6/uL


(4.30-5.70) 


 


 





 


Hemoglobin


 12.2 g/dL


(13.0-17.5) 


 


 





 


Hematocrit


 35.8 %


(39.0-53.0) 


 


 





 


Mean Corpuscular Volume 96 fL ()    


 


Mean Corpuscular Hemoglobin 33 pg (25-35)    


 


Mean Corpuscular Hemoglobin


Concent 34 g/dL


(31-37) 


 


 





 


Red Cell Distribution Width


 13.6 %


(11.5-14.5) 


 


 





 


Platelet Count


 124 x10^3/uL


(140-400) 


 


 





 


Neutrophils (%) (Auto) 96 % (31-73)    


 


Lymphocytes (%) (Auto) 1 % (24-48)    


 


Monocytes (%) (Auto) 3 % (0-9)    


 


Eosinophils (%) (Auto) 0 % (0-3)    


 


Basophils (%) (Auto) 0 % (0-3)    


 


Neutrophils # (Auto)


 7.8 x10^3/uL


(1.8-7.7) 


 


 





 


Lymphocytes # (Auto)


 0.1 x10^3/uL


(1.0-4.8) 


 


 





 


Monocytes # (Auto)


 0.2 x10^3/uL


(0.0-1.1) 


 


 





 


Eosinophils # (Auto)


 0.0 x10^3/uL


(0.0-0.7) 


 


 





 


Basophils # (Auto)


 0.0 x10^3/uL


(0.0-0.2) 


 


 





 


Sodium Level


 136 mmol/L


(136-145) 


 


 





 


Potassium Level


 4.3 mmol/L


(3.5-5.1) 


 


 





 


Chloride Level


 100 mmol/L


() 


 


 





 


Carbon Dioxide Level


 25 mmol/L


(21-32) 


 


 





 


Anion Gap 11 (6-14)    


 


Blood Urea Nitrogen


 14 mg/dL


(8-26) 


 


 





 


Creatinine


 0.7 mg/dL


(0.7-1.3) 


 


 





 


Estimated GFR


(Cockcroft-Gault) 140.7 


 


 


 





 


Glucose Level


 282 mg/dL


(70-99) 


 


 





 


Calcium Level


 8.5 mg/dL


(8.5-10.1) 


 


 





 


Glucose (Fingerstick)


 


 226 mg/dL


(70-99) 148 mg/dL


(70-99) 142 mg/dL


(70-99)


 


Test


 6/10/21


07:05 6/10/21


08:00 6/10/21


18:16 6/11/21


00:06


 


White Blood Count


 11.2 x10^3/uL


(4.0-11.0) 


 


 





 


Red Blood Count


 4.18 x10^6/uL


(4.30-5.70) 


 


 





 


Hemoglobin


 13.6 g/dL


(13.0-17.5) 


 


 





 


Hematocrit


 40.1 %


(39.0-53.0) 


 


 





 


Mean Corpuscular Volume 96 fL ()    


 


Mean Corpuscular Hemoglobin 33 pg (25-35)    


 


Mean Corpuscular Hemoglobin


Concent 34 g/dL


(31-37) 


 


 





 


Red Cell Distribution Width


 14.4 %


(11.5-14.5) 


 


 





 


Platelet Count


 126 x10^3/uL


(140-400) 


 


 





 


Neutrophils (%) (Auto) 93 % (31-73)    


 


Lymphocytes (%) (Auto) 3 % (24-48)    


 


Monocytes (%) (Auto) 4 % (0-9)    


 


Eosinophils (%) (Auto) 0 % (0-3)    


 


Basophils (%) (Auto) 0 % (0-3)    


 


Neutrophils # (Auto)


 10.4 x10^3/uL


(1.8-7.7) 


 


 





 


Lymphocytes # (Auto)


 0.4 x10^3/uL


(1.0-4.8) 


 


 





 


Monocytes # (Auto)


 0.4 x10^3/uL


(0.0-1.1) 


 


 





 


Eosinophils # (Auto)


 0.0 x10^3/uL


(0.0-0.7) 


 


 





 


Basophils # (Auto)


 0.0 x10^3/uL


(0.0-0.2) 


 


 





 


Segmented Neutrophils % 86 % (35-66)    


 


Band Neutrophils % 6 % (0-9)    


 


Lymphocytes % 2 % (24-48)    


 


Monocytes % 6 % (0-10)    


 


Platelet Estimate


 Adequate


(ADEQUATE) 


 


 





 


Sodium Level


 143 mmol/L


(136-145) 


 


 





 


Potassium Level


 3.9 mmol/L


(3.5-5.1) 


 


 





 


Chloride Level


 104 mmol/L


() 


 


 





 


Carbon Dioxide Level


 29 mmol/L


(21-32) 


 


 





 


Anion Gap 10 (6-14)    


 


Blood Urea Nitrogen


 12 mg/dL


(8-26) 


 


 





 


Creatinine


 0.7 mg/dL


(0.7-1.3) 


 


 





 


Estimated GFR


(Cockcroft-Gault) 140.7 


 


 


 





 


Glucose Level


 136 mg/dL


(70-99) 


 


 





 


Calcium Level


 9.6 mg/dL


(8.5-10.1) 


 


 





 


O2 Saturation  100 % (92-99)   


 


Arterial Blood pH


 


 7.43


(7.35-7.45) 


 





 


Arterial Blood pCO2 at


Patient Temp 


 40 mmHg


(35-46) 


 





 


Arterial Blood pO2 at Patient


Temp 


 192 mmHg


() 


 





 


Arterial Blood HCO3


 


 26 mmol/L


(21-28) 


 





 


Arterial Blood Base Excess


 


 2 mmol/L


(-3-3) 


 





 


FiO2  40% vent   


 


Glucose (Fingerstick)


 


 


 118 mg/dL


(70-99) 135 mg/dL


(70-99)


 


Test


 6/11/21


04:30 6/11/21


07:56 


 





 


White Blood Count


 11.4 x10^3/uL


(4.0-11.0) 


 


 





 


Red Blood Count


 4.06 x10^6/uL


(4.30-5.70) 


 


 





 


Hemoglobin


 13.1 g/dL


(13.0-17.5) 


 


 





 


Hematocrit


 39.3 %


(39.0-53.0) 


 


 





 


Mean Corpuscular Volume 97 fL ()    


 


Mean Corpuscular Hemoglobin 32 pg (25-35)    


 


Mean Corpuscular Hemoglobin


Concent 33 g/dL


(31-37) 


 


 





 


Red Cell Distribution Width


 14.1 %


(11.5-14.5) 


 


 





 


Platelet Count


 103 x10^3/uL


(140-400) 


 


 





 


Neutrophils (%) (Auto) 92 % (31-73)    


 


Lymphocytes (%) (Auto) 2 % (24-48)    


 


Monocytes (%) (Auto) 6 % (0-9)    


 


Eosinophils (%) (Auto) 0 % (0-3)    


 


Basophils (%) (Auto) 0 % (0-3)    


 


Neutrophils # (Auto)


 10.6 x10^3/uL


(1.8-7.7) 


 


 





 


Lymphocytes # (Auto)


 0.2 x10^3/uL


(1.0-4.8) 


 


 





 


Monocytes # (Auto)


 0.7 x10^3/uL


(0.0-1.1) 


 


 





 


Eosinophils # (Auto)


 0.0 x10^3/uL


(0.0-0.7) 


 


 





 


Basophils # (Auto)


 0.0 x10^3/uL


(0.0-0.2) 


 


 





 


Sodium Level


 142 mmol/L


(136-145) 


 


 





 


Potassium Level


 3.6 mmol/L


(3.5-5.1) 


 


 





 


Chloride Level


 104 mmol/L


() 


 


 





 


Carbon Dioxide Level


 31 mmol/L


(21-32) 


 


 





 


Anion Gap 7 (6-14)    


 


Blood Urea Nitrogen


 16 mg/dL


(8-26) 


 


 





 


Creatinine


 0.6 mg/dL


(0.7-1.3) 


 


 





 


Estimated GFR


(Cockcroft-Gault) 168.0 


 


 


 





 


Glucose Level


 115 mg/dL


(70-99) 


 


 





 


Calcium Level


 9.4 mg/dL


(8.5-10.1) 


 


 





 


O2 Saturation  96 % (92-99)   


 


Arterial Blood pH


 


 7.41


(7.35-7.45) 


 





 


Arterial Blood pCO2 at


Patient Temp 


 43 mmHg


(35-46) 


 





 


Arterial Blood pO2 at Patient


Temp 


 84 mmHg


() 


 





 


Arterial Blood HCO3


 


 27 mmol/L


(21-28) 


 





 


Arterial Blood Base Excess


 


 2 mmol/L


(-3-3) 


 





 


FiO2  30   








Laboratory Tests








Test


 6/10/21


18:16 6/11/21


00:06 6/11/21


04:30 6/11/21


07:56


 


Glucose (Fingerstick)


 118 mg/dL


(70-99) 135 mg/dL


(70-99) 


 





 


White Blood Count


 


 


 11.4 x10^3/uL


(4.0-11.0) 





 


Red Blood Count


 


 


 4.06 x10^6/uL


(4.30-5.70) 





 


Hemoglobin


 


 


 13.1 g/dL


(13.0-17.5) 





 


Hematocrit


 


 


 39.3 %


(39.0-53.0) 





 


Mean Corpuscular Volume   97 fL ()  


 


Mean Corpuscular Hemoglobin   32 pg (25-35)  


 


Mean Corpuscular Hemoglobin


Concent 


 


 33 g/dL


(31-37) 





 


Red Cell Distribution Width


 


 


 14.1 %


(11.5-14.5) 





 


Platelet Count


 


 


 103 x10^3/uL


(140-400) 





 


Neutrophils (%) (Auto)   92 % (31-73)  


 


Lymphocytes (%) (Auto)   2 % (24-48)  


 


Monocytes (%) (Auto)   6 % (0-9)  


 


Eosinophils (%) (Auto)   0 % (0-3)  


 


Basophils (%) (Auto)   0 % (0-3)  


 


Neutrophils # (Auto)


 


 


 10.6 x10^3/uL


(1.8-7.7) 





 


Lymphocytes # (Auto)


 


 


 0.2 x10^3/uL


(1.0-4.8) 





 


Monocytes # (Auto)


 


 


 0.7 x10^3/uL


(0.0-1.1) 





 


Eosinophils # (Auto)


 


 


 0.0 x10^3/uL


(0.0-0.7) 





 


Basophils # (Auto)


 


 


 0.0 x10^3/uL


(0.0-0.2) 





 


Sodium Level


 


 


 142 mmol/L


(136-145) 





 


Potassium Level


 


 


 3.6 mmol/L


(3.5-5.1) 





 


Chloride Level


 


 


 104 mmol/L


() 





 


Carbon Dioxide Level


 


 


 31 mmol/L


(21-32) 





 


Anion Gap   7 (6-14)  


 


Blood Urea Nitrogen


 


 


 16 mg/dL


(8-26) 





 


Creatinine


 


 


 0.6 mg/dL


(0.7-1.3) 





 


Estimated GFR


(Cockcroft-Gault) 


 


 168.0 


 





 


Glucose Level


 


 


 115 mg/dL


(70-99) 





 


Calcium Level


 


 


 9.4 mg/dL


(8.5-10.1) 





 


O2 Saturation    96 % (92-99) 


 


Arterial Blood pH


 


 


 


 7.41


(7.35-7.45)


 


Arterial Blood pCO2 at


Patient Temp 


 


 


 43 mmHg


(35-46)


 


Arterial Blood pO2 at Patient


Temp 


 


 


 84 mmHg


()


 


Arterial Blood HCO3


 


 


 


 27 mmol/L


(21-28)


 


Arterial Blood Base Excess


 


 


 


 2 mmol/L


(-3-3)


 


FiO2    30 








Medications





Active Scripts








 Medications  Dose


 Route/Sig


 Max Daily Dose Days Date Category


 


 Metoprolol


 Succinate ( Xl )


  (Metoprolol


 Succinate) 25 Mg


 Tab.er.24h  25 Mg


 PO DAILY


   6/8/21 Reported


 


 Hydrochlorothiazide


 Tablet


  (Hydrochlorothiazide)


 12.5 Mg Tablet  12.5 Mg


 PO DAILY


   6/8/21 Reported


 


 Ventolin Hfa


 Inhaler


  (Albuterol


 Sulfate) 18 Gm


 Hfa.aer.ad  2 Puff


 INH PRN Q4HRS PRN


   8/15/19 Reported


 


 Amlodipine


 Besylate 10 Mg


 Tablet  10 Mg


 PO DAILY


  30 9/29/17 Rx











Impression


.


ASSESSMENT:


1.  Acute respiratory failure secondary to angioedema from lisinopril.


2.  ACE inhibitor induced angioedema.


3.  Hypertension.


4.  Chronic obstructive pulmonary disease.


5.  Abnormal chest x-ray revealing opacity, chronic in the right upper lobe, 


will review previous radiographic studies. If the patient has not had a CT in 


the past 12 months, we will obtain CT chest.





Plan


.


PLAN:


Continue current vent support 30% 


Follow CXR/ABG


Follow surgery recs-- will need trach for approx. week


NEBS


Will add steroids and Doxy for bronchitis


ST recs-- NPO, cont. PPN 


DVT/GI PPX 





D/W RN and RT











NADIA ANNE MD              Jun 11, 2021 08:34

## 2021-06-11 NOTE — PDOC
[facility] PROGRESS NOTE


Date of Service


DOS:


DATE: 6/11/21 


TIME: 06:27





Chief Complaint


Chief Complaint


Angioedema


Systolic CHF


Hypertension





Plan:


Patient seen epinephrine and Solu-Medrol in ED with noted improvement


We will continue treatment with Benadryl 25 mg every 4 hr , Solu-Medrol 60 mg 

every 6 hours, epinephrine IM as needed


If symptoms are worsening or not improving with epinephrine may provide 

tranexamic acid 1 g IV


Patient will need to discontinue lisinopril upon discharge and be prescribed 

losartan due to systolic CHF with ejection fraction 35-40%;


Admit to ICU for monitoring and hopefully discharge tomorrow when noted 

improvement


FEN - Cardiac diet as tolerated


PPX  - Lovenox


FULL CODE


Dispo - admit to ICU for further monitoring





History of Present Illness


History of Present Illness


Patient is a 55-year-old male past medical history hypertension, who presents to

the ED with complaints of lip swelling that began this morning.  Patient was 

recently discharged from our service and during that time he had echocardiogram 

that showed systolic function is mildly to moderately impaired, with Ejection 

Fraction is 35-40%; there is global hypokinesis of the left ventricle; septal 

motion suggestive of conduction defect.  He was initiated on lisinopril, Toprol-

XL, and amlodipine. Patient reportedly took his prescribed lisinopril today and 

woke up from a nap with noted swelling to his lips and left side of his face.  

He initially denied any history of similar symptoms to the ED attending, but 

when family was present she did note he has a history of similar reaction to his

face and lips about 2-3 years ago.  This reaction responded with medications and

they never did figure out what the cause of this reaction was at that time.  

Denies any family history of angioedema.  Treated with Solu-Medrol and some 

epinephrine in the ER.  Will admit patient for monitoring overnight





6/9/2021: Due to concerns of swelling to tongue and compromised airway, 

anesthesia was consulted to help perform tracheostomy.  Patient currently on 

vent, FiO2 40%, PEEP 5.  Had discussion with wife at bedside, she states that 

history of similar episode occurred roughly 3 years ago and she believes this 

reaction was after taking a blood pressure medication she cannot recall.  We 

will continue supportive care.  Due to some steroid-induced hyperglycemia will 

initiate insulin treatment.  2.  Time 30 minutes spent reviewing charts, 

reviewing labs, reviewing imaging, discussion with wife bedside, discussion with

pulmonology, and discussion with RN.





6/10/2021: Afebrile, no acute overnight.  Remains on vent with FiO2 40%.  Plan 

for vacation sedation tomorrow morning.   Critical care time 30 minutes spent 

reviewing charts, reviewing labs, reviewing imaging, discussion with family, and

discussion with RN.





6/11/2021: No acute events overnight.  On trach collar with FiO2 35%, PEEP 5.  

Afebrile.  Sedation vacation planned today.  Critical care time 30 minutes spent

reviewing charts, reviewing labs, formulating discharge plan, discussion with 

RN.





Vitals/I&O


Vitals/I&O:





                                   Vital Signs








  Date Time  Temp Pulse Resp B/P (MAP) Pulse Ox O2 Delivery O2 Flow Rate FiO2


 


6/11/21 06:00  66 14 135/97 (110) 100 Ventilator  


 


6/11/21 04:00 98.2       





 98.2       


 


6/11/21 00:40       3.0 














                                    I & O   


 


 6/10/21 6/10/21 6/11/21





 15:00 23:00 07:00


 


Intake Total  342 ml 1189 ml


 


Output Total 195 ml 325 ml 360 ml


 


Balance -195 ml 17 ml 829 ml











Physical Exam


General:  No acute distress


Heart:  Regular rate


Lungs:  Other (Coarse breath sounds)


Abdomen:  Normal bowel sounds, Soft


Extremities:  No clubbing, No cyanosis


Skin:  No rashes, No breakdown, Other (Tracheostomy in place)





Labs


Labs:





Laboratory Tests








Test


 6/10/21


07:05 6/10/21


08:00 6/10/21


18:16 6/11/21


00:06


 


White Blood Count


 11.2 x10^3/uL


(4.0-11.0) 


 


 





 


Red Blood Count


 4.18 x10^6/uL


(4.30-5.70) 


 


 





 


Hemoglobin


 13.6 g/dL


(13.0-17.5) 


 


 





 


Hematocrit


 40.1 %


(39.0-53.0) 


 


 





 


Mean Corpuscular Volume 96 fL ()    


 


Mean Corpuscular Hemoglobin 33 pg (25-35)    


 


Mean Corpuscular Hemoglobin


Concent 34 g/dL


(31-37) 


 


 





 


Red Cell Distribution Width


 14.4 %


(11.5-14.5) 


 


 





 


Platelet Count


 126 x10^3/uL


(140-400) 


 


 





 


Neutrophils (%) (Auto) 93 % (31-73)    


 


Lymphocytes (%) (Auto) 3 % (24-48)    


 


Monocytes (%) (Auto) 4 % (0-9)    


 


Eosinophils (%) (Auto) 0 % (0-3)    


 


Basophils (%) (Auto) 0 % (0-3)    


 


Neutrophils # (Auto)


 10.4 x10^3/uL


(1.8-7.7) 


 


 





 


Lymphocytes # (Auto)


 0.4 x10^3/uL


(1.0-4.8) 


 


 





 


Monocytes # (Auto)


 0.4 x10^3/uL


(0.0-1.1) 


 


 





 


Eosinophils # (Auto)


 0.0 x10^3/uL


(0.0-0.7) 


 


 





 


Basophils # (Auto)


 0.0 x10^3/uL


(0.0-0.2) 


 


 





 


Segmented Neutrophils % 86 % (35-66)    


 


Band Neutrophils % 6 % (0-9)    


 


Lymphocytes % 2 % (24-48)    


 


Monocytes % 6 % (0-10)    


 


Platelet Estimate


 Adequate


(ADEQUATE) 


 


 





 


Sodium Level


 143 mmol/L


(136-145) 


 


 





 


Potassium Level


 3.9 mmol/L


(3.5-5.1) 


 


 





 


Chloride Level


 104 mmol/L


() 


 


 





 


Carbon Dioxide Level


 29 mmol/L


(21-32) 


 


 





 


Anion Gap 10 (6-14)    


 


Blood Urea Nitrogen


 12 mg/dL


(8-26) 


 


 





 


Creatinine


 0.7 mg/dL


(0.7-1.3) 


 


 





 


Estimated GFR


(Cockcroft-Gault) 140.7 


 


 


 





 


Glucose Level


 136 mg/dL


(70-99) 


 


 





 


Calcium Level


 9.6 mg/dL


(8.5-10.1) 


 


 





 


O2 Saturation  100 % (92-99)   


 


Arterial Blood pH


 


 7.43


(7.35-7.45) 


 





 


Arterial Blood pCO2 at


Patient Temp 


 40 mmHg


(35-46) 


 





 


Arterial Blood pO2 at Patient


Temp 


 192 mmHg


() 


 





 


Arterial Blood HCO3


 


 26 mmol/L


(21-28) 


 





 


Arterial Blood Base Excess


 


 2 mmol/L


(-3-3) 


 





 


FiO2  40% vent   


 


Glucose (Fingerstick)


 


 


 118 mg/dL


(70-99) 135 mg/dL


(70-99)


 


Test


 6/11/21


04:30 


 


 





 


White Blood Count


 11.4 x10^3/uL


(4.0-11.0) 


 


 





 


Red Blood Count


 4.06 x10^6/uL


(4.30-5.70) 


 


 





 


Hemoglobin


 13.1 g/dL


(13.0-17.5) 


 


 





 


Hematocrit


 39.3 %


(39.0-53.0) 


 


 





 


Mean Corpuscular Volume 97 fL ()    


 


Mean Corpuscular Hemoglobin 32 pg (25-35)    


 


Mean Corpuscular Hemoglobin


Concent 33 g/dL


(31-37) 


 


 





 


Red Cell Distribution Width


 14.1 %


(11.5-14.5) 


 


 





 


Platelet Count


 103 x10^3/uL


(140-400) 


 


 





 


Neutrophils (%) (Auto) 92 % (31-73)    


 


Lymphocytes (%) (Auto) 2 % (24-48)    


 


Monocytes (%) (Auto) 6 % (0-9)    


 


Eosinophils (%) (Auto) 0 % (0-3)    


 


Basophils (%) (Auto) 0 % (0-3)    


 


Neutrophils # (Auto)


 10.6 x10^3/uL


(1.8-7.7) 


 


 





 


Lymphocytes # (Auto)


 0.2 x10^3/uL


(1.0-4.8) 


 


 





 


Monocytes # (Auto)


 0.7 x10^3/uL


(0.0-1.1) 


 


 





 


Eosinophils # (Auto)


 0.0 x10^3/uL


(0.0-0.7) 


 


 





 


Basophils # (Auto)


 0.0 x10^3/uL


(0.0-0.2) 


 


 





 


Sodium Level


 142 mmol/L


(136-145) 


 


 





 


Potassium Level


 3.6 mmol/L


(3.5-5.1) 


 


 





 


Chloride Level


 104 mmol/L


() 


 


 





 


Carbon Dioxide Level


 31 mmol/L


(21-32) 


 


 





 


Anion Gap 7 (6-14)    


 


Blood Urea Nitrogen


 16 mg/dL


(8-26) 


 


 





 


Creatinine


 0.6 mg/dL


(0.7-1.3) 


 


 





 


Estimated GFR


(Cockcroft-Gault) 168.0 


 


 


 





 


Glucose Level


 115 mg/dL


(70-99) 


 


 





 


Calcium Level


 9.4 mg/dL


(8.5-10.1) 


 


 














Assessment and Plan


Assessmemt and Plan


Problems


Medical Problems:


(1) ACE inhibitor-aggravated angioedema


Status: Acute  











Comment


Review of Relevant


I have reviewed the following items elizabeth (where applicable) has been applied.


Medications:





Current Medications








 Medications


  (Trade)  Dose


 Ordered  Sig/Yg


 Route


 PRN Reason  Start Time


 Stop Time Status Last Admin


Dose Admin


 


 Amino Acids/


 Glycerin/


 Electrolytes  1,000 ml @ 


 80 mls/hr  [id number]


 IV


   6/10/21 16:00


    6/11/21 03:41














Justifications for Admission


Other Justification


Angioedema











ROSALINO WREN MD            Jun 11, 2021 06:29

## 2021-06-11 NOTE — NUR
SS following up with discharge planning. SS reviewed pt chart and discussed with pt RN. Pt 
extubated today and is currently on room air. Trach capped. ST ordered. Pt on PPN. SS will 
continue to follow for discharge planning.

## 2021-06-12 VITALS — SYSTOLIC BLOOD PRESSURE: 133 MMHG | DIASTOLIC BLOOD PRESSURE: 89 MMHG

## 2021-06-12 VITALS — SYSTOLIC BLOOD PRESSURE: 163 MMHG | DIASTOLIC BLOOD PRESSURE: 97 MMHG

## 2021-06-12 VITALS — DIASTOLIC BLOOD PRESSURE: 108 MMHG | SYSTOLIC BLOOD PRESSURE: 184 MMHG

## 2021-06-12 VITALS — SYSTOLIC BLOOD PRESSURE: 125 MMHG | DIASTOLIC BLOOD PRESSURE: 85 MMHG

## 2021-06-12 VITALS — SYSTOLIC BLOOD PRESSURE: 168 MMHG | DIASTOLIC BLOOD PRESSURE: 96 MMHG

## 2021-06-12 VITALS — SYSTOLIC BLOOD PRESSURE: 142 MMHG | DIASTOLIC BLOOD PRESSURE: 96 MMHG

## 2021-06-12 VITALS — DIASTOLIC BLOOD PRESSURE: 98 MMHG | SYSTOLIC BLOOD PRESSURE: 156 MMHG

## 2021-06-12 VITALS — DIASTOLIC BLOOD PRESSURE: 88 MMHG | SYSTOLIC BLOOD PRESSURE: 140 MMHG

## 2021-06-12 VITALS — SYSTOLIC BLOOD PRESSURE: 150 MMHG | DIASTOLIC BLOOD PRESSURE: 108 MMHG

## 2021-06-12 VITALS — DIASTOLIC BLOOD PRESSURE: 96 MMHG | SYSTOLIC BLOOD PRESSURE: 159 MMHG

## 2021-06-12 VITALS — SYSTOLIC BLOOD PRESSURE: 124 MMHG | DIASTOLIC BLOOD PRESSURE: 92 MMHG

## 2021-06-12 VITALS — DIASTOLIC BLOOD PRESSURE: 97 MMHG | SYSTOLIC BLOOD PRESSURE: 147 MMHG

## 2021-06-12 VITALS — SYSTOLIC BLOOD PRESSURE: 146 MMHG | DIASTOLIC BLOOD PRESSURE: 87 MMHG

## 2021-06-12 VITALS — SYSTOLIC BLOOD PRESSURE: 159 MMHG | DIASTOLIC BLOOD PRESSURE: 67 MMHG

## 2021-06-12 VITALS — DIASTOLIC BLOOD PRESSURE: 101 MMHG | SYSTOLIC BLOOD PRESSURE: 174 MMHG

## 2021-06-12 VITALS — DIASTOLIC BLOOD PRESSURE: 104 MMHG | SYSTOLIC BLOOD PRESSURE: 163 MMHG

## 2021-06-12 VITALS — SYSTOLIC BLOOD PRESSURE: 170 MMHG | DIASTOLIC BLOOD PRESSURE: 103 MMHG

## 2021-06-12 VITALS — SYSTOLIC BLOOD PRESSURE: 158 MMHG | DIASTOLIC BLOOD PRESSURE: 98 MMHG

## 2021-06-12 VITALS — SYSTOLIC BLOOD PRESSURE: 151 MMHG | DIASTOLIC BLOOD PRESSURE: 99 MMHG

## 2021-06-12 VITALS — DIASTOLIC BLOOD PRESSURE: 108 MMHG | SYSTOLIC BLOOD PRESSURE: 172 MMHG

## 2021-06-12 VITALS — SYSTOLIC BLOOD PRESSURE: 151 MMHG | DIASTOLIC BLOOD PRESSURE: 95 MMHG

## 2021-06-12 VITALS — DIASTOLIC BLOOD PRESSURE: 91 MMHG | SYSTOLIC BLOOD PRESSURE: 125 MMHG

## 2021-06-12 VITALS — SYSTOLIC BLOOD PRESSURE: 136 MMHG | DIASTOLIC BLOOD PRESSURE: 94 MMHG

## 2021-06-12 VITALS — DIASTOLIC BLOOD PRESSURE: 89 MMHG | SYSTOLIC BLOOD PRESSURE: 129 MMHG

## 2021-06-12 LAB
AMPHETAMINE/METHAMPHETAMINE: (no result)
BARBITURATES UR-MCNC: (no result) UG/ML
BENZODIAZ UR-MCNC: (no result) UG/L
CANNABINOIDS UR-MCNC: (no result) UG/L
COCAINE UR-MCNC: (no result) NG/ML
METHADONE SERPL-MCNC: (no result) NG/ML
OPIATES UR-MCNC: (no result) NG/ML
PCP SERPL-MCNC: (no result) MG/DL

## 2021-06-12 RX ADMIN — DOXYCYCLINE SCH MLS/HR: 100 INJECTION, POWDER, LYOPHILIZED, FOR SOLUTION INTRAVENOUS at 21:38

## 2021-06-12 RX ADMIN — IPRATROPIUM BROMIDE AND ALBUTEROL SULFATE SCH ML: .5; 3 SOLUTION RESPIRATORY (INHALATION) at 21:00

## 2021-06-12 RX ADMIN — FAMOTIDINE SCH MG: 10 INJECTION, SOLUTION INTRAVENOUS at 09:19

## 2021-06-12 RX ADMIN — DEXMEDETOMIDINE HYDROCHLORIDE PRN MLS/HR: 100 INJECTION, SOLUTION, CONCENTRATE INTRAVENOUS at 17:37

## 2021-06-12 RX ADMIN — GLYCERIN, ISOLEUCINE, LEUCINE, LYSINE, METHIONINE, PHENYLALANINE, THREONINE, TRYPTOPHAN, VALINE, ALANINE, GLYCINE, ARGININE, HISTIDINE, PROLINE, SERINE, CYSTEINE, SODIUM ACETATE, MAGNESIUM ACETATE, CALCIUM ACETATE, SODIUM CHLORIDE, POTASSIUM CHLORIDE, PHOSPHORIC ACID, AND POTASSIUM METABISULFITE SCH MLS/HR
3; .21; .27; .22; .16; .17; .12; .046; .2; .21; .42; .29; .085; .34; .18; .014; .2; .054; .026; .12; .15; .041 INJECTION INTRAVENOUS at 17:36

## 2021-06-12 RX ADMIN — DOXYCYCLINE SCH MLS/HR: 100 INJECTION, POWDER, LYOPHILIZED, FOR SOLUTION INTRAVENOUS at 12:12

## 2021-06-12 RX ADMIN — GLYCERIN, ISOLEUCINE, LEUCINE, LYSINE, METHIONINE, PHENYLALANINE, THREONINE, TRYPTOPHAN, VALINE, ALANINE, GLYCINE, ARGININE, HISTIDINE, PROLINE, SERINE, CYSTEINE, SODIUM ACETATE, MAGNESIUM ACETATE, CALCIUM ACETATE, SODIUM CHLORIDE, POTASSIUM CHLORIDE, PHOSPHORIC ACID, AND POTASSIUM METABISULFITE SCH MLS/HR
3; .21; .27; .22; .16; .17; .12; .046; .2; .21; .42; .29; .085; .34; .18; .014; .2; .054; .026; .12; .15; .041 INJECTION INTRAVENOUS at 05:30

## 2021-06-12 RX ADMIN — CEFTRIAXONE SODIUM SCH GM: 2 INJECTION, POWDER, FOR SOLUTION INTRAMUSCULAR; INTRAVENOUS at 14:44

## 2021-06-12 RX ADMIN — IPRATROPIUM BROMIDE AND ALBUTEROL SULFATE SCH ML: .5; 3 SOLUTION RESPIRATORY (INHALATION) at 15:26

## 2021-06-12 RX ADMIN — DEXMEDETOMIDINE HYDROCHLORIDE PRN MLS/HR: 100 INJECTION, SOLUTION, CONCENTRATE INTRAVENOUS at 04:31

## 2021-06-12 RX ADMIN — HALOPERIDOL LACTATE PRN MG: 5 INJECTION, SOLUTION INTRAMUSCULAR at 15:04

## 2021-06-12 RX ADMIN — HYDRALAZINE HYDROCHLORIDE PRN MG: 20 INJECTION INTRAMUSCULAR; INTRAVENOUS at 09:22

## 2021-06-12 RX ADMIN — DEXMEDETOMIDINE HYDROCHLORIDE PRN MLS/HR: 100 INJECTION, SOLUTION, CONCENTRATE INTRAVENOUS at 09:14

## 2021-06-12 RX ADMIN — HYDRALAZINE HYDROCHLORIDE PRN MG: 20 INJECTION INTRAMUSCULAR; INTRAVENOUS at 13:15

## 2021-06-12 RX ADMIN — IPRATROPIUM BROMIDE AND ALBUTEROL SULFATE SCH ML: .5; 3 SOLUTION RESPIRATORY (INHALATION) at 11:19

## 2021-06-12 RX ADMIN — ENOXAPARIN SODIUM SCH MG: 40 INJECTION SUBCUTANEOUS at 20:38

## 2021-06-12 RX ADMIN — IPRATROPIUM BROMIDE AND ALBUTEROL SULFATE SCH ML: .5; 3 SOLUTION RESPIRATORY (INHALATION) at 07:28

## 2021-06-12 RX ADMIN — MORPHINE SULFATE PRN MG: 10 INJECTION INTRAMUSCULAR; INTRAVENOUS; SUBCUTANEOUS at 01:01

## 2021-06-12 RX ADMIN — FOLIC ACID SCH MLS/HR: 5 INJECTION, SOLUTION INTRAMUSCULAR; INTRAVENOUS; SUBCUTANEOUS at 12:29

## 2021-06-12 RX ADMIN — OLANZAPINE SCH MG: 10 INJECTION, POWDER, FOR SOLUTION INTRAMUSCULAR at 09:28

## 2021-06-12 RX ADMIN — HALOPERIDOL LACTATE PRN MG: 5 INJECTION, SOLUTION INTRAMUSCULAR at 06:05

## 2021-06-12 RX ADMIN — MORPHINE SULFATE PRN MG: 10 INJECTION INTRAMUSCULAR; INTRAVENOUS; SUBCUTANEOUS at 04:30

## 2021-06-12 RX ADMIN — FAMOTIDINE SCH MG: 10 INJECTION, SOLUTION INTRAVENOUS at 20:38

## 2021-06-12 RX ADMIN — MORPHINE SULFATE PRN MG: 10 INJECTION INTRAMUSCULAR; INTRAVENOUS; SUBCUTANEOUS at 09:49

## 2021-06-12 NOTE — PDOC
PULMONARY PROGRESS NOTES


DATE: 6/12/21 


TIME: 13:10


Subjective


PT. is now on T-shield 


increased confusion and agitation overnight, now on precedex gtt


afebrile


Vitals





Vital Signs








  Date Time  Temp Pulse Resp B/P (MAP) Pulse Ox O2 Delivery O2 Flow Rate FiO2


 


6/12/21 12:00      Trach Collar  


 


6/12/21 12:00 98.3 68 16 172/108 (129) 97  8.0 





 98.3       








Comments


vent


HEENT:  Other (trach midline )


Lungs:  Other (Coarse breath sounds)


Cardiovascular:  S1


Abdomen:  Soft


Extremities:  No Edema


Labs





Laboratory Tests








Test


 6/10/21


18:16 6/11/21


00:06 6/11/21


04:30 6/11/21


07:56


 


Glucose (Fingerstick)


 118 mg/dL


(70-99) 135 mg/dL


(70-99) 


 





 


White Blood Count


 


 


 11.4 x10^3/uL


(4.0-11.0) 





 


Red Blood Count


 


 


 4.06 x10^6/uL


(4.30-5.70) 





 


Hemoglobin


 


 


 13.1 g/dL


(13.0-17.5) 





 


Hematocrit


 


 


 39.3 %


(39.0-53.0) 





 


Mean Corpuscular Volume   97 fL ()  


 


Mean Corpuscular Hemoglobin   32 pg (25-35)  


 


Mean Corpuscular Hemoglobin


Concent 


 


 33 g/dL


(31-37) 





 


Red Cell Distribution Width


 


 


 14.1 %


(11.5-14.5) 





 


Platelet Count


 


 


 103 x10^3/uL


(140-400) 





 


Neutrophils (%) (Auto)   92 % (31-73)  


 


Lymphocytes (%) (Auto)   2 % (24-48)  


 


Monocytes (%) (Auto)   6 % (0-9)  


 


Eosinophils (%) (Auto)   0 % (0-3)  


 


Basophils (%) (Auto)   0 % (0-3)  


 


Neutrophils # (Auto)


 


 


 10.6 x10^3/uL


(1.8-7.7) 





 


Lymphocytes # (Auto)


 


 


 0.2 x10^3/uL


(1.0-4.8) 





 


Monocytes # (Auto)


 


 


 0.7 x10^3/uL


(0.0-1.1) 





 


Eosinophils # (Auto)


 


 


 0.0 x10^3/uL


(0.0-0.7) 





 


Basophils # (Auto)


 


 


 0.0 x10^3/uL


(0.0-0.2) 





 


Sodium Level


 


 


 142 mmol/L


(136-145) 





 


Potassium Level


 


 


 3.6 mmol/L


(3.5-5.1) 





 


Chloride Level


 


 


 104 mmol/L


() 





 


Carbon Dioxide Level


 


 


 31 mmol/L


(21-32) 





 


Anion Gap   7 (6-14)  


 


Blood Urea Nitrogen


 


 


 16 mg/dL


(8-26) 





 


Creatinine


 


 


 0.6 mg/dL


(0.7-1.3) 





 


Estimated GFR


(Cockcroft-Gault) 


 


 168.0 


 





 


Glucose Level


 


 


 115 mg/dL


(70-99) 





 


Calcium Level


 


 


 9.4 mg/dL


(8.5-10.1) 





 


O2 Saturation    96 % (92-99) 


 


Arterial Blood pH


 


 


 


 7.41


(7.35-7.45)


 


Arterial Blood pCO2 at


Patient Temp 


 


 


 43 mmHg


(35-46)


 


Arterial Blood pO2 at Patient


Temp 


 


 


 84 mmHg


()


 


Arterial Blood HCO3


 


 


 


 27 mmol/L


(21-28)


 


Arterial Blood Base Excess


 


 


 


 2 mmol/L


(-3-3)


 


FiO2    30 








Medications





Active Scripts








 Medications  Dose


 Route/Sig


 Max Daily Dose Days Date Category


 


 Metoprolol


 Succinate ( Xl )


  (Metoprolol


 Succinate) 25 Mg


 Tab.er.24h  25 Mg


 PO DAILY


   6/8/21 Reported


 


 Hydrochlorothiazide


 Tablet


  (Hydrochlorothiazide)


 12.5 Mg Tablet  12.5 Mg


 PO DAILY


   6/8/21 Reported


 


 Ventolin Hfa


 Inhaler


  (Albuterol


 Sulfate) 18 Gm


 Hfa.aer.ad  2 Puff


 INH PRN Q4HRS PRN


   8/15/19 Reported


 


 Amlodipine


 Besylate 10 Mg


 Tablet  10 Mg


 PO DAILY


  30 9/29/17 Rx








Comments


CT chest 


IMPRESSION:


  


1. New left lower lobe dependent consolidation, which may represent infection or

aspiration.





2. Stable right apical cavitation with intracavitary lesion which could 

represent aspergilloma. Additional right apical spiculated 2.4 cm nodule is 

stable from recent exams, but decreased in size from 2013.





Impression


.


ASSESSMENT:


1.  Acute respiratory failure secondary to angioedema from lisinopril.


2.  ACE inhibitor induced angioedema--improving, now S/P trach 


3.  Hypertension.


4.  Chronic obstructive pulmonary disease.


5.  Abnormal chest x-ray revealing opacity, chronic in the right upper lobe, 


will review previous radiographic studies. If the patient has not had a CT in 


the past 12 months, we will obtain CT chest.





Plan


.


PLAN:


Continue supplemental oxygen to keep sats above 92%, T-shield during the day, 

Vent support PRN 


Follow CXR/ABG


CT chest reviewed


Follow surgery recs-- will need trach for approx. week


CIWA for ETOH withdraw, Continue precedex gtt 


NEBS


Follow ID recs for ABX, D/W ID that he may need repeat bronchoscopy 


Continue steroids 


ST recs-- NPO, cont. PPN 


DVT/GI PPX 





D/W RN and RT











NADIA ANNE MD              Jun 12, 2021 13:13

## 2021-06-12 NOTE — RAD
CT THORAX W 



INDICATION:  spiculated lung lesion in 2019 needs reevaluation 



Comparison: Serial CT chest, most recent dated 5/26/2021



TECHNIQUE: Following the uneventful administration of intravenous contrast, 75 cc Omnipaque 300, axia
l CT sections were obtained through the lungs and upper abdomen. Multiplanar reconstructions and MIP 
images were obtained.



PQRS compliance statement:



One or more of the following individualized dose reduction techniques were utilized for this examinat
ion:

1. Automated exposure control

2. Adjustment of the mA and/or kV according to patient size

3. Use of iterative reconstruction technique



FINDINGS:



Lungs and Airways: Stable right apical consolidation with architectural distortion, bronchiectasis, c
alcification, and intracavitary density. Additional right apical spiculated 2.4 cm nodule is stable f
rom recent exams, but decreased in size from 2013. Hyperexpanded left lung. Calcified pulmonary granu
agatha. New left lower lobe dependent consolidation. Tracheostomy.



Pleura: The pleural spaces are normal.



Heart and Mediastinum: The visualized thyroid is normal in size and attenuation. No axillary or supra
clavicular lymphadenopathy. No mediastinal, hilar or retrocrural lymphadenopathy. Calcified mediastin
al lymph nodes consistent with remote granulomatous disease The heart and pericardium are within norm
al limits. Ectatic ascending thoracic aorta measures 4 cm at the level of the right pulmonary artery.
 



Abdomen: Hepatic steatosis.



Bones and Soft Tissues: Degenerative changes of the spine.



IMPRESSION:

  

1. New left lower lobe dependent consolidation, which may represent infection or aspiration.



2. Stable right apical cavitation with intracavitary lesion which could represent aspergilloma. Addit
ional right apical spiculated 2.4 cm nodule is stable from recent exams, but decreased in size from 2
013.



Electronically signed by: Lawrence Soriano MD (6/12/2021 11:22 AM) [id number]

## 2021-06-12 NOTE — PDOC
Infectious Disease Note


Vital Sign


Vital Signs





Vital Signs








  Date Time  Temp Pulse Resp B/P (MAP) Pulse Ox O2 Delivery O2 Flow Rate FiO2


 


6/12/21 11:20     97 Tracheal Collar 8.0 


 


6/12/21 10:19   14     


 


6/12/21 10:00  78  146/87 (106)    


 


6/12/21 07:00 98.3       





 98.3       











Labs


Micro


CHEST CT


1. New left lower lobe dependent consolidation, which may represent infection or

aspiration.


2. Stable right apical cavitation with intracavitary lesion which could 

represent aspergilloma. Additional right apical spiculated 2.4 cm nodule is 

stable from recent exams, but decreased in size from 2013.





Objective


Assessment


Chronic cavitation RUL. h/o broncoscopy in 2017 + CALB and Aspergillus 

fumigatus. AFB neg. lost to follow-up. 


h/o TB treated 2008. AFB in 2017 and 2019 were neg. 


Leukocytosis in part reactive steroids 


Angioedema from lisinopril 


Acute respiratory failure s/p emergent tracheostomy 6/9.


Hypertension.


Chronic obstructive pulmonary disease.


Tobacco and cannabis 


Alcohol abuse 


HIV neg 2019





Plan


Plan of Care


Doxycycline per pulmonary 


Maintain aspiration precautions 


Discussed with nursing and wife at bedside 





Recommendations to follow


Thank you 





Critically ill.  D/w nursing and wife


Add Rocephin and Flagyl for aspiration - recent Augmentin.  Last AFB in 2019 and

Aspergillosis in 2017


Given H/o aspergillus and ? fungus ball.  bronch may be beneficial





Records reviewed.





Attending Co-Sign


Attending Co-Sign


The patient was seen and interviewed as well as examined at the bedside. The 

chart was reviewed. The case was discussed. Agree with the plan of care.











CAROLYN COLBY        Jun 12, 2021 13:14


PADMINI CUEVA MD              Jun 12, 2021 14:20

## 2021-06-12 NOTE — CONS
DATE OF CONSULTATION: 06/12/2021

REFERRING PHYSICIAN:  Fredrick Tsai MD.



REASON FOR CONSULTATION:  History of tuberculosis and persistent cavitary 

lesion.



HISTORY OF PRESENT ILLNESS:  This patient is a 55-year-old -American male

who is currently sedated, unable to provide history of present illness, past 

medical history or review of systems.  He was admitted on 06/08/2021 with 

angioedema after starting lisinopril.  He was treated with epinephrine, Benadryl

and steroids.  Unfortunately, he had to undergo emergent tracheostomy.  A CT 

chest showed a new left lower lobe dependent consolidation, which may represent 

infection or aspiration; and stable right apical cavitation with intracavitary 

lesion which could represent aspergilloma.  Additional right apical spiculated 

2.4 cm nodule is stable from recent exams, but decreased in size from 2013.



The patient has a history of homelessness and spent time in penitentiary.  He was 

treated for Mycobacterium tuberculosis in 2008 (details unavailable).  In 2017, 

he had a bronchoscopy done.  AFB was negative, but fungal cultures were positive

for Candida albicans and aspergillosis.  According to his wife, he did not have 

any followup and she does not recall him having treatment for fungal infection. 

In 2019, repeat AFB was again negative.



PAST MEDICAL HISTORY:  Mycobacterium tuberculosis in 2008 which was treated.  

Chronic cavitary lesion, right upper lobe with history of Aspergillus fumigatus 

on bronchoscopy in 2017, hypertension, COPD, gastroesophageal reflux disease, 

alcohol abuse, substance abuse, history of thrombocytopenia.  HIV screen 

negative in 2019.



PAST SURGICAL HISTORY:  Emergent tracheostomy on 06/09/2021.



FAMILY HISTORY:  Hypertension.



SOCIAL HISTORY:  The patient is  and lives at home.  He has a history of 

homelessness at present time.  He has a history of heavy drinking as well as 

history of cocaine use, marijuana and tobacco.



ALLERGIES:  LISINOPRIL.



MEDICATIONS:  Doxycycline, prednisone.  Other medications are available and have

been reviewed on the MAR.  Recent history of Augmentin.



PHYSICAL EXAMINATION:

VITAL SIGNS:  Temperature is 98.3, blood pressure is 146/87, heart rate is 78, 

respiratory rate is 14, pulse oximetry is 97% via trach shield.

GENERAL:  The patient is sedated with mittens.

HEENT:  Pupils small.  Normal conjunctivae.  Oral cavity pink.

NECK:  Tracheostomy.

LUNGS:  Rhonchi.  No accessory muscle use.

HEART:  S1 and S2, regular.

ABDOMEN:  Nondistended, soft.  No grimace or guarding to palpation.

GENITOURINARY:  Indwelling Ramirez in place.

EXTREMITIES:  No gross edema or cyanosis.

SKIN:  Warm to touch.  No signs of rash.  Peripheral IVs look okay.



LABORATORY DATA:  Recent WBC 11.4; hemoglobin 13.1; platelets 103,000.  Sodium 

142, potassium 3.6, creatinine 0.6, BUN 16, glucose 115.  Chest CT per HPI.



ASSESSMENT:

1.  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

on bronchoscopy in 2017.

2.  History of Mycobacterium tuberculosis, treated in 2008.  AFB negative in 

2017 and 2019.

3.  Leukocytosis in part reactive to steroids.

4.  Angioedema from Lisinopril.

5.  Acute respiratory failure, status post emergent tracheostomy on 06/09.

6.  Hypertension.

7.  Chronic obstructive pulmonary disease.

8.  Tobaccoism and substance abuse.

9.  Alcohol abuse.



PLAN:

1.  Recommend adding ceftriaxone and metronidazole for aspiration.

2.  Continue the doxycycline per Pulmonary.

3.  Given history of aspergillosis and possible fungus ball, a bronchoscopy may 

be beneficial.

4.  Monitor laboratory values and temperature.

5.  Maintain aspiration precautions.

6.  Discussed with nursing and wife at bedside.



Thank you, Dr. Tsai for asking us to participate in this patient's care.  Should 

you have further questions or concerns, please call.  The patient was seen and 

examined and plan of care implemented by Dr. Saeed Canas.







JERROD/[facility] [place]

DR: JERROD/adrianna   DD: 06/12/2021 17:26

DT: 06/12/2021 18:30   TID: [id number]

MTDD

## 2021-06-12 NOTE — NUR
Patient increasingly agitated throughout shift. Wife Evans attempted to talk patient down 
through the phone.  Order received from Dr. Otoole for haldol PRN and precedex gtt. 
Precedex gtt ultimately started. Patient is extremely paranoid, will not allow for cares to 
be completed even after repeated education. Patient has mentioned many times that he just 
wants to home, multiple attempts on getting out of bed, and pulling at lines, tele and 
wagner. At time of this note, patient is currently is resting, unable to wean off precedex.

## 2021-06-12 NOTE — PDOC
GENERAL


General:


Patient examined chart reviewed today's hospital day 5 for this patient with re

spiratory failure secondary to acute angioedema thought to be from recently 

added lisinopril.  Patient is well-known to the team health services he was just

on service with heart failure and multiple new meds were added including 

lisinopril.  Patient was taken on admission for emergency tracheotomy due to the

upper airway obstruction from angioedema.  He is having very slow progress in 

the intensive care unit mostly held back by extreme agitation.  Alcohol level 

and drug screen was not done on admission but last admission he did have report 

of alcohol abuse.  Patient also has a history of tuberculosis was treated in 

2017.  He had a spiculated lung mass in 2019 when he was on service does not 

appear to have been reevaluated.  We will proceed with CT lung now.  I have 

added a standing dose of daily Zyprexa and the CIWA protocol.  We will continue 

to titrate off Precedex and benzodiazepine as tolerated.  Patient does need 

continued intensive care given the situation.  We will consult infectious 

diseases with a question of whether he needs 3 tested for tuberculosis given the

continued cavitary mass in his lung at least on that last scan in 2019.  Time 

spent today is 30 minutes with greater than 50% in counseling and coordination 

of care most of which in discussion with team and review of records.


Problems:  


(1) ACE inhibitor-aggravated angioedema


(2) Angioedema


(3) Chronic tuberculosis


(4) History of alcoholism





VITAL SIGNS


Vital Signs/I&O:





                                   Vital Signs








  Date Time  Temp Pulse Resp B/P (MAP) Pulse Ox O2 Delivery O2 Flow Rate FiO2


 


6/12/21 10:00  78 16 146/87 (106) 94 Tracheal Collar 8.0 


 


6/12/21 07:00 98.3       





 98.3       














                                    I & O   


 


 6/11/21 6/11/21 6/12/21





 15:00 23:00 07:00


 


Intake Total  836 ml 495 ml


 


Output Total 525 ml 725 ml 900 ml


 


Balance -525 ml 111 ml -405 ml





Patient is resting comfortably after dose of Zyprexa.  Earlier this morning he 

was quite agitated despite several sedatives


Tracheostomy is covered with a trach shield no secretions noted


Chest is clear to auscultation


Heart S1-S2 normal regular rate and rhythm no murmurs or gallops are noted


Abdomen soft nontender nondistended no masses organomegaly noted


Extremity exam is unremarkable for acute abnormality





ALLERGIES


Allergies:





Allergies








Coded Allergies Type Severity Reaction Last Updated Verified


 


  lisinopril Allergy Severe ANGIOEDEMA 6/8/21 Yes











MEDS


Medications:





Current Medications








 Medications


  (Trade)  Dose


 Ordered  Sig/Yg  Start Time


 Stop Time Status Last Admin


Dose Admin


 


 Acetaminophen


  (Tylenol)  650 mg  PRN Q6HRS  PRN  6/8/21 16:30


     





 


 Acetaminophen/


 Hydrocodone Bitart


  (Lortab 5/325)  1 tab  PRN Q4HRS  PRN  6/8/21 16:30


     





 


 Al Hydroxide/Mg


 Hydroxide


  (Mylanta Plus Xs)  30 ml  PRN Q3HRS  PRN  6/8/21 16:30


     





 


 Albuterol/


 Ipratropium


  (Duoneb)  3 ml  RTQID  6/9/21 08:00


    6/12/21 07:28





 


 Amino Acids/


 Glycerin/


 Electrolytes  1,000 ml @ 


 80 mls/hr  [id number]  6/10/21 16:00


    6/11/21 16:33





 


 Atropine Sulfate


  (ATROPINE 0.5mg


 SYRINGE)  0.5 mg  PRN Q5MIN  PRN  6/11/21 22:30


     





 


 Calcium Carbonate/


 Glycine


  (Tums)  500 mg  PRN Q3HRS  PRN  6/8/21 16:30


     





 


 Dexmedetomidine


 HCl 400 mcg/


 Sodium Chloride  100 ml @ 


 2.635 mls/


 hr  CONT  PRN  6/11/21 22:30


    6/12/21 09:14





 


 Dextrose


  (Dextrose


 50%-Water Syringe)  12.5 gm  PRN Q15MIN  PRN  6/9/21 14:00


 6/11/21 12:31 DC  





 


 Dextrose


  (Iv Dextrose 5%)  250 ml  PRN Q15MIN  PRN  6/9/21 14:00


   UNV  





 


 Diphenhydramine


 HCl


  (Benadryl)  25 mg  Q4HRS  6/8/21 20:00


 6/11/21 11:37 DC 6/11/21 09:01





 


 Doxycycline


 Hyclate


  (Vibra-Tab)  100 mg  BID  6/11/21 21:00


     





 


 Enoxaparin Sodium


  (Lovenox 40mg


 Syringe)  40 mg  Q24H  6/8/21 21:00


    6/11/21 20:43





 


 Epinephrine HCl


  (Adrenalin)  0.3 mg  PRN Q5MIN  PRN  6/8/21 16:15


    6/9/21 05:20





 


 Etomidate


  (Amidate)  20 mg  1X  ONCE  6/9/21 08:00


 6/9/21 08:01 DC 6/9/21 05:30





 


 Famotidine


  (Pepcid Vial)  20 mg  BID  6/8/21 16:30


    6/12/21 09:19





 


 Fentanyl Citrate  30 ml @ 0


 mls/hr  CONT  PRN  6/9/21 07:00


 6/11/21 12:31 DC 6/11/21 00:10





 


 Fentanyl Citrate


  (Fentanyl 2ml


 Vial)  50 mcg  PRN Q1HR  PRN  6/8/21 15:45


 6/9/21 06:54 DC  





 


 Haloperidol


 Lactate


  (Haldol Inj)  5 mg  PRN Q6HRS  PRN  6/11/21 20:15


    6/12/21 06:05





 


 Hydralazine HCl


  (Apresoline Inj)  10 mg  PRN Q4HRS  PRN  6/11/21 11:45


    6/12/21 09:22





 


 Info


  (CONTRAST GIVEN


 -- Rx MONITORING)  1 each  PRN DAILY  PRN  6/12/21 10:15


 6/14/21 10:14   





 


 Insulin Glargine


  (Lantus Syringe)  5 unit  QHS  6/9/21 21:00


 6/11/21 12:31 DC 6/10/21 20:49





 


 Insulin Human


 Lispro


  (HumaLOG)  0-9 UNITS  Q6HRS  6/9/21 18:00


 6/11/21 12:31 DC  





 


 Iohexol


  (Omnipaque 300


 Mg/ml)  75 ml  1X  ONCE  6/12/21 10:15


 6/12/21 10:16 DC  





 


 Labetalol HCl


  (Normodyne Iv


 Push)  20 mg  PRN Q2HR  PRN  6/11/21 11:45


     





 


 Lorazepam


  (Ativan Inj)  0.5 mg  PRN Q6HRS  PRN  6/8/21 16:30


    6/12/21 09:50





 


 Lorazepam


  (Ativan)  1 mg  PRN Q6HRS  PRN  6/8/21 16:30


     





 


 Magnesium


 Hydroxide


  (Milk Of


 Magnesia)  2,400 mg  PRN Q12HR  PRN  6/8/21 16:30


     





 


 Methylprednisolone


 Sodium Succinate


  (SOLU-Medrol


 125MG VIAL)  60 mg  Q6HRS  6/9/21 00:00


 6/11/21 11:37 DC 6/11/21 05:47





 


 Midazolam HCl


  (Versed)  5 mg  1X  ONCE  6/9/21 07:15


 6/9/21 07:28 DC 6/9/21 07:19





 


 Morphine Sulfate


  (Morphine


 Sulfate)  5 mg  PRN Q2HR  PRN  6/11/21 14:00


    6/12/21 09:49





 


 Olanzapine


  (ZyPREXA IM)  10 mg  DAILY  6/12/21 10:00


    6/12/21 09:28





 


 Ondansetron HCl


  (Zofran)  4 mg  PRN Q6HRS  PRN  6/8/21 16:30


     





 


 Prednisone


  (Prednisone)  30 mg  DAILY  6/12/21 09:00


     





 


 Propofol  100 ml @ 0


 mls/hr  CONT  PRN  6/9/21 07:00


 6/11/21 12:31 DC 6/11/21 06:36





 


 Sodium Chloride  500 ml @ 


 500 mls/hr  1X PRN  PRN  6/11/21 22:30


     





 


 Sodium Chloride


  (Normal Saline


 Flush)  3 ml  QSHIFT  PRN  6/8/21 16:30


     





 


 Succinylcholine


 Chloride


  (Anectine)  200 mg  1X  ONCE  6/9/21 08:00


 6/9/21 08:01 DC 6/9/21 05:30





 


 Zolpidem Tartrate


  (Ambien)  5 mg  PRN QHS  PRN  6/8/21 16:30


     











Current Medications








 Medications


  (Trade)  Dose


 Ordered  Sig/Yg


 Route


 PRN Reason  Start Time


 Stop Time Status Last Admin


Dose Admin


 


 Hydralazine HCl


  (Apresoline Inj)  10 mg  PRN Q4HRS  PRN


 IVP


 HYPERTENSION, 2nd CHOICE  6/11/21 11:45


    6/12/21 09:22





 


 Morphine Sulfate


  (Morphine


 Sulfate)  5 mg  PRN Q2HR  PRN


 IV


 PAIN  6/11/21 14:00


    6/12/21 09:49





 


 Haloperidol


 Lactate


  (Haldol Inj)  5 mg  PRN Q6HRS  PRN


 IVP


 AGITATION- 2ND CHOICE  6/11/21 20:15


    6/12/21 06:05





 


 Dexmedetomidine


 HCl 400 mcg/


 Sodium Chloride  100 ml @ 


 2.635 mls/


 hr  CONT  PRN


 IV


 PER PROTOCOL  6/11/21 22:30


    6/12/21 09:14





 


 Olanzapine


  (ZyPREXA IM)  10 mg  DAILY


 IM


   6/12/21 10:00


    6/12/21 09:28














ASSESSMENT & PLAN


A&P


Plan as noted above








This note was created using Dragon [employer] and may have omissions and/or 

errors due to the nature of real-time voice transcription.





Justifications for Admission


Other Justification


Angioedema











ENDER MARTINEZ MD                Jun 12, 2021 10:28

## 2021-06-13 VITALS — SYSTOLIC BLOOD PRESSURE: 164 MMHG | DIASTOLIC BLOOD PRESSURE: 19 MMHG

## 2021-06-13 VITALS — DIASTOLIC BLOOD PRESSURE: 94 MMHG | SYSTOLIC BLOOD PRESSURE: 153 MMHG

## 2021-06-13 VITALS — DIASTOLIC BLOOD PRESSURE: 99 MMHG | SYSTOLIC BLOOD PRESSURE: 162 MMHG

## 2021-06-13 VITALS — SYSTOLIC BLOOD PRESSURE: 142 MMHG | DIASTOLIC BLOOD PRESSURE: 95 MMHG

## 2021-06-13 VITALS — SYSTOLIC BLOOD PRESSURE: 158 MMHG | DIASTOLIC BLOOD PRESSURE: 99 MMHG

## 2021-06-13 VITALS — SYSTOLIC BLOOD PRESSURE: 154 MMHG | DIASTOLIC BLOOD PRESSURE: 100 MMHG

## 2021-06-13 VITALS — DIASTOLIC BLOOD PRESSURE: 95 MMHG | SYSTOLIC BLOOD PRESSURE: 154 MMHG

## 2021-06-13 VITALS — DIASTOLIC BLOOD PRESSURE: 94 MMHG | SYSTOLIC BLOOD PRESSURE: 164 MMHG

## 2021-06-13 VITALS — DIASTOLIC BLOOD PRESSURE: 89 MMHG | SYSTOLIC BLOOD PRESSURE: 148 MMHG

## 2021-06-13 VITALS — SYSTOLIC BLOOD PRESSURE: 176 MMHG | DIASTOLIC BLOOD PRESSURE: 103 MMHG

## 2021-06-13 VITALS — SYSTOLIC BLOOD PRESSURE: 139 MMHG | DIASTOLIC BLOOD PRESSURE: 85 MMHG

## 2021-06-13 VITALS — DIASTOLIC BLOOD PRESSURE: 102 MMHG | SYSTOLIC BLOOD PRESSURE: 176 MMHG

## 2021-06-13 VITALS — DIASTOLIC BLOOD PRESSURE: 108 MMHG | SYSTOLIC BLOOD PRESSURE: 164 MMHG

## 2021-06-13 VITALS — SYSTOLIC BLOOD PRESSURE: 170 MMHG | DIASTOLIC BLOOD PRESSURE: 96 MMHG

## 2021-06-13 VITALS — SYSTOLIC BLOOD PRESSURE: 180 MMHG | DIASTOLIC BLOOD PRESSURE: 108 MMHG

## 2021-06-13 VITALS — SYSTOLIC BLOOD PRESSURE: 142 MMHG | DIASTOLIC BLOOD PRESSURE: 90 MMHG

## 2021-06-13 VITALS — DIASTOLIC BLOOD PRESSURE: 96 MMHG | SYSTOLIC BLOOD PRESSURE: 150 MMHG

## 2021-06-13 VITALS — DIASTOLIC BLOOD PRESSURE: 90 MMHG | SYSTOLIC BLOOD PRESSURE: 145 MMHG

## 2021-06-13 VITALS — SYSTOLIC BLOOD PRESSURE: 160 MMHG | DIASTOLIC BLOOD PRESSURE: 89 MMHG

## 2021-06-13 VITALS — DIASTOLIC BLOOD PRESSURE: 100 MMHG | SYSTOLIC BLOOD PRESSURE: 178 MMHG

## 2021-06-13 VITALS — SYSTOLIC BLOOD PRESSURE: 160 MMHG | DIASTOLIC BLOOD PRESSURE: 101 MMHG

## 2021-06-13 VITALS — SYSTOLIC BLOOD PRESSURE: 162 MMHG | DIASTOLIC BLOOD PRESSURE: 102 MMHG

## 2021-06-13 VITALS — SYSTOLIC BLOOD PRESSURE: 156 MMHG | DIASTOLIC BLOOD PRESSURE: 89 MMHG

## 2021-06-13 VITALS — SYSTOLIC BLOOD PRESSURE: 144 MMHG | DIASTOLIC BLOOD PRESSURE: 91 MMHG

## 2021-06-13 LAB
ALBUMIN SERPL-MCNC: 2.8 G/DL (ref 3.4–5)
ALBUMIN/GLOB SERPL: 0.6 {RATIO} (ref 1–1.7)
ALP SERPL-CCNC: 60 U/L (ref 46–116)
ALT SERPL-CCNC: 86 U/L (ref 16–63)
ANION GAP SERPL CALC-SCNC: 7 MMOL/L (ref 6–14)
AST SERPL-CCNC: 81 U/L (ref 15–37)
BASOPHILS # BLD AUTO: 0 X10^3/UL (ref 0–0.2)
BASOPHILS NFR BLD: 0 % (ref 0–3)
BILIRUB SERPL-MCNC: 1 MG/DL (ref 0.2–1)
BUN SERPL-MCNC: 12 MG/DL (ref 8–26)
BUN/CREAT SERPL: 24 (ref 6–20)
CALCIUM SERPL-MCNC: 9.8 MG/DL (ref 8.5–10.1)
CHLORIDE SERPL-SCNC: 101 MMOL/L (ref 98–107)
CO2 SERPL-SCNC: 32 MMOL/L (ref 21–32)
CREAT SERPL-MCNC: 0.5 MG/DL (ref 0.7–1.3)
EOSINOPHIL NFR BLD: 0 % (ref 0–3)
EOSINOPHIL NFR BLD: 0 X10^3/UL (ref 0–0.7)
ERYTHROCYTE [DISTWIDTH] IN BLOOD BY AUTOMATED COUNT: 14 % (ref 11.5–14.5)
GFR SERPLBLD BASED ON 1.73 SQ M-ARVRAT: 207.4 ML/MIN
GLUCOSE SERPL-MCNC: 108 MG/DL (ref 70–99)
HCT VFR BLD CALC: 40 % (ref 39–53)
HGB BLD-MCNC: 13.5 G/DL (ref 13–17.5)
LYMPHOCYTES # BLD: 0.2 X10^3/UL (ref 1–4.8)
LYMPHOCYTES NFR BLD AUTO: 3 % (ref 24–48)
MCH RBC QN AUTO: 33 PG (ref 25–35)
MCHC RBC AUTO-ENTMCNC: 34 G/DL (ref 31–37)
MCV RBC AUTO: 96 FL (ref 79–100)
MONO #: 0.7 X10^3/UL (ref 0–1.1)
MONOCYTES NFR BLD: 9 % (ref 0–9)
NEUT #: 6.7 X10^3/UL (ref 1.8–7.7)
NEUTROPHILS NFR BLD AUTO: 88 % (ref 31–73)
PLATELET # BLD AUTO: 82 X10^3/UL (ref 140–400)
POTASSIUM SERPL-SCNC: 3.1 MMOL/L (ref 3.5–5.1)
PROT SERPL-MCNC: 7.5 G/DL (ref 6.4–8.2)
RBC # BLD AUTO: 4.15 X10^6/UL (ref 4.3–5.7)
SODIUM SERPL-SCNC: 140 MMOL/L (ref 136–145)
WBC # BLD AUTO: 7.6 X10^3/UL (ref 4–11)

## 2021-06-13 RX ADMIN — FAMOTIDINE SCH MG: 10 INJECTION, SOLUTION INTRAVENOUS at 08:39

## 2021-06-13 RX ADMIN — METHYLPREDNISOLONE SODIUM SUCCINATE SCH MG: 40 INJECTION, POWDER, FOR SOLUTION INTRAMUSCULAR; INTRAVENOUS at 09:50

## 2021-06-13 RX ADMIN — IPRATROPIUM BROMIDE AND ALBUTEROL SULFATE SCH ML: .5; 3 SOLUTION RESPIRATORY (INHALATION) at 11:05

## 2021-06-13 RX ADMIN — CEFTRIAXONE SODIUM SCH GM: 2 INJECTION, POWDER, FOR SOLUTION INTRAMUSCULAR; INTRAVENOUS at 15:16

## 2021-06-13 RX ADMIN — DOXYCYCLINE SCH MLS/HR: 100 INJECTION, POWDER, LYOPHILIZED, FOR SOLUTION INTRAVENOUS at 20:38

## 2021-06-13 RX ADMIN — POTASSIUM CHLORIDE SCH MLS/HR: 7.46 INJECTION, SOLUTION INTRAVENOUS at 13:01

## 2021-06-13 RX ADMIN — IPRATROPIUM BROMIDE AND ALBUTEROL SULFATE SCH ML: .5; 3 SOLUTION RESPIRATORY (INHALATION) at 15:52

## 2021-06-13 RX ADMIN — IPRATROPIUM BROMIDE AND ALBUTEROL SULFATE SCH ML: .5; 3 SOLUTION RESPIRATORY (INHALATION) at 20:00

## 2021-06-13 RX ADMIN — IPRATROPIUM BROMIDE AND ALBUTEROL SULFATE SCH ML: .5; 3 SOLUTION RESPIRATORY (INHALATION) at 07:17

## 2021-06-13 RX ADMIN — DEXMEDETOMIDINE HYDROCHLORIDE PRN MLS/HR: 100 INJECTION, SOLUTION, CONCENTRATE INTRAVENOUS at 01:53

## 2021-06-13 RX ADMIN — GLYCERIN, ISOLEUCINE, LEUCINE, LYSINE, METHIONINE, PHENYLALANINE, THREONINE, TRYPTOPHAN, VALINE, ALANINE, GLYCINE, ARGININE, HISTIDINE, PROLINE, SERINE, CYSTEINE, SODIUM ACETATE, MAGNESIUM ACETATE, CALCIUM ACETATE, SODIUM CHLORIDE, POTASSIUM CHLORIDE, PHOSPHORIC ACID, AND POTASSIUM METABISULFITE SCH MLS/HR
3; .21; .27; .22; .16; .17; .12; .046; .2; .21; .42; .29; .085; .34; .18; .014; .2; .054; .026; .12; .15; .041 INJECTION INTRAVENOUS at 19:20

## 2021-06-13 RX ADMIN — POTASSIUM CHLORIDE SCH MLS/HR: 7.46 INJECTION, SOLUTION INTRAVENOUS at 14:23

## 2021-06-13 RX ADMIN — HALOPERIDOL LACTATE PRN MG: 5 INJECTION, SOLUTION INTRAMUSCULAR at 18:42

## 2021-06-13 RX ADMIN — HYDRALAZINE HYDROCHLORIDE PRN MG: 20 INJECTION INTRAMUSCULAR; INTRAVENOUS at 01:08

## 2021-06-13 RX ADMIN — POTASSIUM CHLORIDE SCH MLS/HR: 7.46 INJECTION, SOLUTION INTRAVENOUS at 11:56

## 2021-06-13 RX ADMIN — ENOXAPARIN SODIUM SCH MG: 40 INJECTION SUBCUTANEOUS at 20:42

## 2021-06-13 RX ADMIN — GLYCERIN, ISOLEUCINE, LEUCINE, LYSINE, METHIONINE, PHENYLALANINE, THREONINE, TRYPTOPHAN, VALINE, ALANINE, GLYCINE, ARGININE, HISTIDINE, PROLINE, SERINE, CYSTEINE, SODIUM ACETATE, MAGNESIUM ACETATE, CALCIUM ACETATE, SODIUM CHLORIDE, POTASSIUM CHLORIDE, PHOSPHORIC ACID, AND POTASSIUM METABISULFITE SCH MLS/HR
3; .21; .27; .22; .16; .17; .12; .046; .2; .21; .42; .29; .085; .34; .18; .014; .2; .054; .026; .12; .15; .041 INJECTION INTRAVENOUS at 18:41

## 2021-06-13 RX ADMIN — DEXMEDETOMIDINE HYDROCHLORIDE PRN MLS/HR: 100 INJECTION, SOLUTION, CONCENTRATE INTRAVENOUS at 20:42

## 2021-06-13 RX ADMIN — OLANZAPINE SCH MG: 10 INJECTION, POWDER, FOR SOLUTION INTRAMUSCULAR at 08:39

## 2021-06-13 RX ADMIN — GLYCERIN, ISOLEUCINE, LEUCINE, LYSINE, METHIONINE, PHENYLALANINE, THREONINE, TRYPTOPHAN, VALINE, ALANINE, GLYCINE, ARGININE, HISTIDINE, PROLINE, SERINE, CYSTEINE, SODIUM ACETATE, MAGNESIUM ACETATE, CALCIUM ACETATE, SODIUM CHLORIDE, POTASSIUM CHLORIDE, PHOSPHORIC ACID, AND POTASSIUM METABISULFITE SCH MLS/HR
3; .21; .27; .22; .16; .17; .12; .046; .2; .21; .42; .29; .085; .34; .18; .014; .2; .054; .026; .12; .15; .041 INJECTION INTRAVENOUS at 05:44

## 2021-06-13 RX ADMIN — FOLIC ACID SCH MLS/HR: 5 INJECTION, SOLUTION INTRAMUSCULAR; INTRAVENOUS; SUBCUTANEOUS at 08:40

## 2021-06-13 RX ADMIN — DOXYCYCLINE SCH MLS/HR: 100 INJECTION, POWDER, LYOPHILIZED, FOR SOLUTION INTRAVENOUS at 08:40

## 2021-06-13 RX ADMIN — FAMOTIDINE SCH MG: 10 INJECTION, SOLUTION INTRAVENOUS at 20:41

## 2021-06-13 RX ADMIN — POTASSIUM CHLORIDE SCH MLS/HR: 7.46 INJECTION, SOLUTION INTRAVENOUS at 15:16

## 2021-06-13 RX ADMIN — DEXMEDETOMIDINE HYDROCHLORIDE PRN MLS/HR: 100 INJECTION, SOLUTION, CONCENTRATE INTRAVENOUS at 13:03

## 2021-06-13 NOTE — PDOC
GENERAL


General:


Patient examined chart reviewed discussed with nursing.  Appreciate subspecialty

support.  He is resting comfortably this morning and had a quiet day after 

treatment with the antipsychotic yesterday morning.  We will need to start 

ProcalAmine while we are awaiting his return to functioning to be able to take 

p.o.  We will start that after he is finished with his current infusion of 

intravenous vitamins today.  It sounds like the tuberculosis was treated for 

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 

in 2017.  We will defer to infectious diseases on targeted antibiotic treatment.

 Continue current management otherwise.


Problems:  


(1) Aspergillosis, with pneumonia


(2) Angioedema


(3) ACE inhibitor-aggravated angioedema


(4) History of alcoholism





VITAL SIGNS


Vital Signs/I&O:





                                   Vital Signs








  Date Time  Temp Pulse Resp B/P (MAP) Pulse Ox O2 Delivery O2 Flow Rate FiO2


 


21 11:05     99 Tracheal Collar 8.0 


 


21 11:00  63 28 162/99 (120)    


 


21 08:00 97.9       





 97.9       














                                    I & O   


 


 21





 15:00 23:00 07:00


 


Intake Total  1564 ml 1214 ml


 


Output Total 670 ml 1055 ml 500 ml


 


Balance -670 ml 509 ml 714 ml





Patient is resting comfortably opens his eyes and interacts some but very sleepy


HEENT exam is notable for that the patient has a trach with shield on some 

secretions noted


Chest is clear to auscultation


Heart S1-S2 normal regular rate and rhythm no murmurs or gallops are noted


Abdomen soft nontender nondistended no masses organomegaly noted


Extremity exam is notable for cachexia





ALLERGIES


Allergies:





Allergies








Coded Allergies Type Severity Reaction Last Updated Verified


 


  lisinopril Allergy Severe ANGIOEDEMA 21 Yes











MEDS


Medications:





Current Medications








 Medications


  (Trade)  Dose


 Ordered  Sig/Yg  Start Time


 Stop Time Status Last Admin


Dose Admin


 


 Acetaminophen


  (Tylenol)  650 mg  PRN Q6HRS  PRN  21 16:30


     





 


 Acetaminophen/


 Hydrocodone Bitart


  (Lortab 5/325)  1 tab  PRN Q4HRS  PRN  21 16:30


     





 


 Al Hydroxide/Mg


 Hydroxide


  (Mylanta Plus Xs)  30 ml  PRN Q3HRS  PRN  21 16:30


     





 


 Albuterol/


 Ipratropium


  (Duoneb)  3 ml  RTQID  21 08:00


    21 11:05





 


 Amino Acids/


 Glycerin/


 Electrolytes  1,000 ml @ 


 80 mls/hr  [id number]  6/10/21 16:00


    21 05:44





 


 Atropine Sulfate


  (ATROPINE 0.5mg


 SYRINGE)  0.5 mg  PRN Q5MIN  PRN  21 22:30


     





 


 Calcium Carbonate/


 Glycine


  (Tums)  500 mg  PRN Q3HRS  PRN  21 16:30


     





 


 Ceftriaxone Sodium


  (Rocephin)  2 gm  Q24H  21 15:00


    21 14:44





 


 Clonidine HCl


  (Catapres)  0.1 mg  PRN Q1HR  PRN  21 10:30


     





 


 Dexmedetomidine


 HCl 400 mcg/


 Sodium Chloride  100 ml @ 


 2.635 mls/


 hr  CONT  PRN  21 22:30


    21 01:53





 


 Dextrose


  (Dextrose


 50%-Water Syringe)  12.5 gm  PRN Q15MIN  PRN  21 14:00


 21 12:31 DC  





 


 Dextrose


  (Iv Dextrose 5%)  250 ml  PRN Q15MIN  PRN  21 14:00


   UNV  





 


 Diphenhydramine


 HCl


  (Benadryl)  25 mg  Q4HRS  21 20:00


 21 11:37 DC 21 09:01





 


 Doxycycline


 Hyclate


  (Vibra-Tab)  100 mg  BID  21 21:00


 21 11:50 DC  





 


 Doxycycline


 Hyclate 100 mg/


 Dextrose  100 ml @ 


 50 mls/hr  Q12HR  21 13:00


    21 08:40





 


 Enoxaparin Sodium


  (Lovenox 40mg


 Syringe)  40 mg  Q24H  21 21:00


    21 20:38





 


 Epinephrine HCl


  (Adrenalin)  0.3 mg  PRN Q5MIN  PRN  21 16:15


    21 05:20





 


 Etomidate


  (Amidate)  20 mg  1X  ONCE  21 08:00


 21 08:01 DC 21 05:30





 


 Famotidine


  (Pepcid Vial)  20 mg  BID  21 16:30


    21 08:39





 


 Fentanyl Citrate  30 ml @ 0


 mls/hr  CONT  PRN  21 07:00


 21 12:31 DC 21 00:10





 


 Fentanyl Citrate


  (Fentanyl 2ml


 Vial)  50 mcg  PRN Q1HR  PRN  21 15:45


 21 06:54 DC  





 


 Haloperidol


 Lactate


  (Haldol Inj)  5 mg  PRN Q6HRS  PRN  21 20:15


    21 15:04





 


 Hydralazine HCl


  (Apresoline Inj)  10 mg  PRN Q4HRS  PRN  21 11:45


    21 01:08





 


 Info


  (CONTRAST GIVEN


 -- Rx MONITORING)  1 each  PRN DAILY  PRN  21 10:15


 21 10:14   





 


 Info


  (Icu Electrolyte


 Protocol)  1 ea  DAILY08  PRN  21 11:00


     





 


 Insulin Glargine


  (Lantus Syringe)  5 unit  QHS  21 21:00


 21 12:31 DC 6/10/21 20:49





 


 Insulin Human


 Lispro


  (HumaLOG)  0-9 UNITS  Q6HRS  21 18:00


 21 12:31 DC  





 


 Iohexol


  (Omnipaque 300


 Mg/ml)  75 ml  1X  ONCE  21 10:15


 21 10:16 DC  





 


 Labetalol HCl


  (Normodyne Iv


 Push)  20 mg  PRN Q2HR  PRN  21 11:45


     





 


 Lorazepam


  (Ativan Inj)  4 mg  PRN Q15MIN  PRN  21 10:30


    21 05:45





 


 Lorazepam


  (Ativan)  8 mg  PRN Q1HR  PRN  21 10:30


     





 


 Magnesium


 Hydroxide


  (Milk Of


 Magnesia)  2,400 mg  PRN Q12HR  PRN  21 16:30


     





 


 Magnesium Sulfate  100 ml @ 


 50 mls/hr  DAILY  21 09:00


 21 11:36 DC  





 


 Methylprednisolone


 Sodium Succinate


  (SOLU-Medrol


 40MG VIAL)  40 mg  DAILY  21 10:00


    21 09:50





 


 Methylprednisolone


 Sodium Succinate


  (SOLU-Medrol


 125MG VIAL)  60 mg  Q6HRS  21 00:00


 21 11:37 DC 21 05:47





 


 Metronidazole  100 ml @ 


 100 mls/hr  Q12HR  21 21:00


    21 08:40





 


 Midazolam HCl


  (Versed)  5 mg  1X  ONCE  21 07:15


 21 07:28 DC 21 07:19





 


 Morphine Sulfate


  (Morphine


 Sulfate)  5 mg  PRN Q2HR  PRN  21 14:00


    21 09:49





 


 Multivitamins 10


 ml/Thiamine HCl


 100 mg/Folic Acid


 1 mg/Sodium


 Chloride  1,011.2 ml


  @ 100 mls/


 hr  DAILY  21 11:00


 21 19:07  21 08:40





 


 Olanzapine


  (ZyPREXA IM)  10 mg  DAILY  21 10:00


    21 08:39





 


 Ondansetron HCl


  (Zofran)  4 mg  PRN Q6HRS  PRN  21 16:30


     





 


 Potassium


 Chloride/Water  100 ml @ 


 100 mls/hr  Q1H  21 11:15


 21 11:36 DC  





 


 Prednisone


  (Prednisone)  30 mg  DAILY  21 09:00


 21 09:14 DC  





 


 Propofol  100 ml @ 0


 mls/hr  CONT  PRN  21 07:00


 21 12:31 DC 21 06:36





 


 Sodium Chloride  500 ml @ 


 500 mls/hr  1X PRN  PRN  21 22:30


     





 


 Sodium Chloride


  (Normal Saline


 Flush)  3 ml  QSHIFT  PRN  21 16:30


     





 


 Sodium Phosphate


 8.33 mmol/Sodium


 Chloride  252.7767


 ml @ 62.5


 mls/hr  1X  ONCE  21 11:15


 21 11:36 DC  





 


 Succinylcholine


 Chloride


  (Anectine)  200 mg  1X  ONCE  21 08:00


 21 08:01 DC 21 05:30





 


 Zolpidem Tartrate


  (Ambien)  5 mg  PRN QHS  PRN  21 16:30


     











Current Medications








 Medications


  (Trade)  Dose


 Ordered  Sig/Yg


 Route


 PRN Reason  Start Time


 Stop Time Status Last Admin


Dose Admin


 


 Doxycycline


 Hyclate 100 mg/


 Dextrose  100 ml @ 


 50 mls/hr  Q12HR


 IV


   21 13:00


    21 08:40





 


 Ceftriaxone Sodium


  (Rocephin)  2 gm  Q24H


 IVP


   21 15:00


    21 14:44





 


 Metronidazole  100 ml @ 


 100 mls/hr  Q12HR


 IV


   21 21:00


    21 08:40





 


 Methylprednisolone


 Sodium Succinate


  (SOLU-Medrol


 40MG VIAL)  40 mg  DAILY


 IV


   21 10:00


    21 09:50





 


 Potassium


 Chloride/Water  100 ml @ 


 100 mls/hr  Q1H


 IV


   21 12:00


 21 15:59  21 11:56














LAB


Lab:





                                Laboratory Tests








Test


 21


13:05 21


04:30


 


Urine Opiates Screen Pos (NEG)   


 


Urine Methadone Screen Neg (NEG)   


 


Urine Barbiturates Neg (NEG)   


 


Urine Phencyclidine Screen Neg (NEG)   


 


Urine


Amphetamine/Methamphetamine Neg (NEG)  


 





 


Urine Benzodiazepines Screen Neg (NEG)   


 


Urine Cocaine Screen Neg (NEG)   


 


Urine Cannabinoids Screen Neg (NEG)   


 


Urine Ethyl Alcohol Neg (NEG)   


 


White Blood Count


 


 7.6 x10^3/uL


(4.0-11.0)


 


Red Blood Count


 


 4.15 x10^6/uL


(4.30-5.70)  L


 


Hemoglobin


 


 13.5 g/dL


(13.0-17.5)


 


Hematocrit


 


 40.0 %


(39.0-53.0)


 


Mean Corpuscular Volume


 


 96 fL ()





 


Mean Corpuscular Hemoglobin  33 pg (25-35)  


 


Mean Corpuscular Hemoglobin


Concent 


 34 g/dL


(31-37)


 


Red Cell Distribution Width


 


 14.0 %


(11.5-14.5)


 


Platelet Count


 


 82 x10^3/uL


(140-400)  L


 


Neutrophils (%) (Auto)  88 % (31-73)  H


 


Lymphocytes (%) (Auto)  3 % (24-48)  L


 


Monocytes (%) (Auto)  9 % (0-9)  


 


Eosinophils (%) (Auto)  0 % (0-3)  


 


Basophils (%) (Auto)  0 % (0-3)  


 


Neutrophils # (Auto)


 


 6.7 x10^3/uL


(1.8-7.7)


 


Lymphocytes # (Auto)


 


 0.2 x10^3/uL


(1.0-4.8)  L


 


Monocytes # (Auto)


 


 0.7 x10^3/uL


(0.0-1.1)


 


Eosinophils # (Auto)


 


 0.0 x10^3/uL


(0.0-0.7)


 


Basophils # (Auto)


 


 0.0 x10^3/uL


(0.0-0.2)


 


Sodium Level


 


 140 mmol/L


(136-145)


 


Potassium Level


 


 3.1 mmol/L


(3.5-5.1)  L


 


Chloride Level


 


 101 mmol/L


()


 


Carbon Dioxide Level


 


 32 mmol/L


(21-32)


 


Anion Gap  7 (6-14)  


 


Blood Urea Nitrogen


 


 12 mg/dL


(8-26)


 


Creatinine


 


 0.5 mg/dL


(0.7-1.3)  L


 


Estimated GFR


(Cockcroft-Gault) 


 207.4  





 


BUN/Creatinine Ratio  24 (6-20)  H


 


Glucose Level


 


 108 mg/dL


(70-99)  H


 


Calcium Level


 


 9.8 mg/dL


(8.5-10.1)


 


Total Bilirubin


 


 1.0 mg/dL


(0.2-1.0)


 


Aspartate Amino Transferase


(AST) 


 81 U/L (15-37)


H


 


Alanine Aminotransferase (ALT)


 


 86 U/L (16-63)


H


 


Alkaline Phosphatase


 


 60 U/L


()


 


Total Protein


 


 7.5 g/dL


(6.4-8.2)


 


Albumin


 


 2.8 g/dL


(3.4-5.0)  L


 


Albumin/Globulin Ratio


 


 0.6 (1.0-1.7)


L





                                Laboratory Tests


21 04:30








                                Laboratory Tests


21 04:30











IMAGING


Imaging:


PATIENT: CHRISTOPHER HUBBARD   ACCOUNT: [id number]     MRN#: [id number]


: 1965           LOCATION: 1 [facility]      AGE: 55


SEX: M                    EXAM DT: 21         ACCESSION#: [id number]


STATUS: ADM IN            ORD. PHYSICIAN: ENDER MARTINEZ MD


REASON: spiculated lung lesion in 2019 needs reevaluation


PROCEDURE: CT CHEST W/CONTRAST





CT THORAX W 





INDICATION:  spiculated lung lesion in 2019 needs reevaluation 





Comparison: Serial CT chest, most recent dated 2021





TECHNIQUE: Following the uneventful administration of intravenous contrast, 75 

cc Omnipaque 300, axial CT sections were obtained through the lungs and upper 

abdomen. Multiplanar reconstructions and MIP images were obtained.





PQRS compliance statement:





One or more of the following individualized dose reduction techniques were 

utilized for this examination:


1. Automated exposure control


2. Adjustment of the mA and/or kV according to patient size


3. Use of iterative reconstruction technique





FINDINGS:





Lungs and Airways: Stable right apical consolidation with architectural 

distortion, bronchiectasis, calcification, and intracavitary density. Additional

right apical spiculated 2.4 cm nodule is stable from recent exams, but decreased

in size from 2013. Hyperexpanded left lung. Calcified pulmonary granulomas. New 

left lower lobe dependent consolidation. Tracheostomy.





Pleura: The pleural spaces are normal.





Heart and Mediastinum: The visualized thyroid is normal in size and attenuation.

No axillary or supraclavicular lymphadenopathy. No mediastinal, hilar or 

retrocrural lymphadenopathy. Calcified mediastinal lymph nodes consistent with 

remote granulomatous disease The heart and pericardium are within normal limits.

Ectatic ascending thoracic aorta measures 4 cm at the level of the right 

pulmonary artery. 





Abdomen: Hepatic steatosis.





Bones and Soft Tissues: Degenerative changes of the spine.





IMPRESSION:


  


1. New left lower lobe dependent consolidation, which may represent infection or

aspiration.





2. Stable right apical cavitation with intracavitary lesion which could repres

ent aspergilloma. Additional right apical spiculated 2.4 cm nodule is stable 

from recent exams, but decreased in size from 2013.





Electronically signed by: Lawrence Soriano MD (2021 11:22 AM) [id number]





ASSESSMENT & PLAN


A&P


Plan as noted above








This note was created using [employer] and may have omissions and/or 

errors due to the nature of real-time voice transcription.





Justifications for Admission


Other Justification


Angioedema











ENDER MARTINEZ MD                2021 12:31

## 2021-06-13 NOTE — PDOC
Infectious Disease Note


Subjective


Subjective


More alert but nonverbal





ROS


ROS


unable to obtain





Vital Sign


Vital Signs





Vital Signs








  Date Time  Temp Pulse Resp B/P (MAP) Pulse Ox O2 Delivery O2 Flow Rate FiO2


 


6/13/21 06:00  71 22 148/89 (108) 100 Tracheal Collar 8.0 


 


6/13/21 04:00 97.7       





 97.7       











Physical Exam


PHYSICAL EXAM


GENERAL:  The patient is alert with mittens. Appears comfortable


HEENT:  Pupils small.  Normal conjunctivae.  Oral cavity pink.


NECK:  Tracheostomy.


LUNGS:  min scattered Rhonchi.  No accessory muscle use.


HEART:  S1 and S2, regular.


ABDOMEN:  Nondistended, soft.  No grimace or guarding to palpation.


GENITOURINARY:  Indwelling Ramirez in place.


EXTREMITIES:  No gross edema or cyanosis. Mitts


SKIN:  Warm to touch.  No signs of rash.  Peripheral IVs look okay.





Labs


Lab





Laboratory Tests








Test


 6/12/21


13:05 6/13/21


04:30


 


Urine Opiates Screen Pos (NEG)  


 


Urine Methadone Screen Neg (NEG)  


 


Urine Barbiturates Neg (NEG)  


 


Urine Phencyclidine Screen Neg (NEG)  


 


Urine


Amphetamine/Methamphetamine Neg (NEG) 


 





 


Urine Benzodiazepines Screen Neg (NEG)  


 


Urine Cocaine Screen Neg (NEG)  


 


Urine Cannabinoids Screen Neg (NEG)  


 


Urine Ethyl Alcohol Neg (NEG)  


 


White Blood Count


 


 7.6 x10^3/uL


(4.0-11.0)


 


Red Blood Count


 


 4.15 x10^6/uL


(4.30-5.70)


 


Hemoglobin


 


 13.5 g/dL


(13.0-17.5)


 


Hematocrit


 


 40.0 %


(39.0-53.0)


 


Mean Corpuscular Volume  96 fL () 


 


Mean Corpuscular Hemoglobin  33 pg (25-35) 


 


Mean Corpuscular Hemoglobin


Concent 


 34 g/dL


(31-37)


 


Red Cell Distribution Width


 


 14.0 %


(11.5-14.5)


 


Platelet Count


 


 82 x10^3/uL


(140-400)


 


Neutrophils (%) (Auto)  88 % (31-73) 


 


Lymphocytes (%) (Auto)  3 % (24-48) 


 


Monocytes (%) (Auto)  9 % (0-9) 


 


Eosinophils (%) (Auto)  0 % (0-3) 


 


Basophils (%) (Auto)  0 % (0-3) 


 


Neutrophils # (Auto)


 


 6.7 x10^3/uL


(1.8-7.7)


 


Lymphocytes # (Auto)


 


 0.2 x10^3/uL


(1.0-4.8)


 


Monocytes # (Auto)


 


 0.7 x10^3/uL


(0.0-1.1)


 


Eosinophils # (Auto)


 


 0.0 x10^3/uL


(0.0-0.7)


 


Basophils # (Auto)


 


 0.0 x10^3/uL


(0.0-0.2)


 


Sodium Level


 


 140 mmol/L


(136-145)


 


Potassium Level


 


 3.1 mmol/L


(3.5-5.1)


 


Chloride Level


 


 101 mmol/L


()


 


Carbon Dioxide Level


 


 32 mmol/L


(21-32)


 


Anion Gap  7 (6-14) 


 


Blood Urea Nitrogen


 


 12 mg/dL


(8-26)


 


Creatinine


 


 0.5 mg/dL


(0.7-1.3)


 


Estimated GFR


(Cockcroft-Gault) 


 207.4 





 


BUN/Creatinine Ratio  24 (6-20) 


 


Glucose Level


 


 108 mg/dL


(70-99)


 


Calcium Level


 


 9.8 mg/dL


(8.5-10.1)


 


Total Bilirubin


 


 1.0 mg/dL


(0.2-1.0)


 


Aspartate Amino Transf


(AST/SGOT) 


 81 U/L (15-37) 





 


Alanine Aminotransferase


(ALT/SGPT) 


 86 U/L (16-63) 





 


Alkaline Phosphatase


 


 60 U/L


()


 


Total Protein


 


 7.5 g/dL


(6.4-8.2)


 


Albumin


 


 2.8 g/dL


(3.4-5.0)


 


Albumin/Globulin Ratio  0.6 (1.0-1.7) 











Objective


Assessment


1.  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


on bronchoscopy in 2017.


2.  History of Mycobacterium tuberculosis, treated in 2008.  AFB negative in 


2017 and 2019.


3.  Leukocytosis in part reactive to steroids.- better


4.  Angioedema from Lisinopril.s/p trach


5.  Acute respiratory failure, status post emergent tracheostomy on 06/09.


6.  Hypertension.


7.  Chronic obstructive pulmonary disease.


8.  Tobaccoism and substance abuse.


9.  Alcohol abuse





Plan


Plan of Care


Doxycycline per pulmonary


Added Rocephin and Flagyl for aspiration 6/12 - recent Augmentin.  Last AFB in 

2019 and Aspergillosis in 2017


Given H/o aspergillus and ? fungus ball.  bronch may be beneficial


Maintain aspiration precautions 


Discussed with nursing 








Critically ill.  D/w nursing











PADMINI CUEVA MD              Jun 13, 2021 06:08

## 2021-06-13 NOTE — PDOC
PULMONARY PROGRESS NOTES


DATE: 6/13/21 


TIME: 07:55


Subjective


Patient remains on trach shield at 33%


Hypertension


Sedated on Precedex drip


Events


Vitals





Vital Signs








  Date Time  Temp Pulse Resp B/P (MAP) Pulse Ox O2 Delivery O2 Flow Rate FiO2


 


6/13/21 07:17     99 Tracheal Collar 8.0 


 


6/13/21 07:00  68 30 142/95 (111)    


 


6/13/21 04:00 97.7       





 97.7       








Comments


Tracheostomy, sedated


HEENT:  Other (trach midline )


Lungs:  Other (Coarse breath sounds)


Cardiovascular:  S1


Abdomen:  Soft


Extremities:  No Edema


Labs





Laboratory Tests








Test


 6/11/21


07:56 6/12/21


13:05 6/13/21


04:30


 


O2 Saturation 96 % (92-99)   


 


Arterial Blood pH


 7.41


(7.35-7.45) 


 





 


Arterial Blood pCO2 at


Patient Temp 43 mmHg


(35-46) 


 





 


Arterial Blood pO2 at Patient


Temp 84 mmHg


() 


 





 


Arterial Blood HCO3


 27 mmol/L


(21-28) 


 





 


Arterial Blood Base Excess


 2 mmol/L


(-3-3) 


 





 


FiO2 30   


 


Urine Opiates Screen  Pos (NEG)  


 


Urine Methadone Screen  Neg (NEG)  


 


Urine Barbiturates  Neg (NEG)  


 


Urine Phencyclidine Screen  Neg (NEG)  


 


Urine


Amphetamine/Methamphetamine 


 Neg (NEG) 


 





 


Urine Benzodiazepines Screen  Neg (NEG)  


 


Urine Cocaine Screen  Neg (NEG)  


 


Urine Cannabinoids Screen  Neg (NEG)  


 


Urine Ethyl Alcohol  Neg (NEG)  


 


White Blood Count


 


 


 7.6 x10^3/uL


(4.0-11.0)


 


Red Blood Count


 


 


 4.15 x10^6/uL


(4.30-5.70)


 


Hemoglobin


 


 


 13.5 g/dL


(13.0-17.5)


 


Hematocrit


 


 


 40.0 %


(39.0-53.0)


 


Mean Corpuscular Volume   96 fL () 


 


Mean Corpuscular Hemoglobin   33 pg (25-35) 


 


Mean Corpuscular Hemoglobin


Concent 


 


 34 g/dL


(31-37)


 


Red Cell Distribution Width


 


 


 14.0 %


(11.5-14.5)


 


Platelet Count


 


 


 82 x10^3/uL


(140-400)


 


Neutrophils (%) (Auto)   88 % (31-73) 


 


Lymphocytes (%) (Auto)   3 % (24-48) 


 


Monocytes (%) (Auto)   9 % (0-9) 


 


Eosinophils (%) (Auto)   0 % (0-3) 


 


Basophils (%) (Auto)   0 % (0-3) 


 


Neutrophils # (Auto)


 


 


 6.7 x10^3/uL


(1.8-7.7)


 


Lymphocytes # (Auto)


 


 


 0.2 x10^3/uL


(1.0-4.8)


 


Monocytes # (Auto)


 


 


 0.7 x10^3/uL


(0.0-1.1)


 


Eosinophils # (Auto)


 


 


 0.0 x10^3/uL


(0.0-0.7)


 


Basophils # (Auto)


 


 


 0.0 x10^3/uL


(0.0-0.2)


 


Sodium Level


 


 


 140 mmol/L


(136-145)


 


Potassium Level


 


 


 3.1 mmol/L


(3.5-5.1)


 


Chloride Level


 


 


 101 mmol/L


()


 


Carbon Dioxide Level


 


 


 32 mmol/L


(21-32)


 


Anion Gap   7 (6-14) 


 


Blood Urea Nitrogen


 


 


 12 mg/dL


(8-26)


 


Creatinine


 


 


 0.5 mg/dL


(0.7-1.3)


 


Estimated GFR


(Cockcroft-Gault) 


 


 207.4 





 


BUN/Creatinine Ratio   24 (6-20) 


 


Glucose Level


 


 


 108 mg/dL


(70-99)


 


Calcium Level


 


 


 9.8 mg/dL


(8.5-10.1)


 


Total Bilirubin


 


 


 1.0 mg/dL


(0.2-1.0)


 


Aspartate Amino Transf


(AST/SGOT) 


 


 81 U/L (15-37) 





 


Alanine Aminotransferase


(ALT/SGPT) 


 


 86 U/L (16-63) 





 


Alkaline Phosphatase


 


 


 60 U/L


()


 


Total Protein


 


 


 7.5 g/dL


(6.4-8.2)


 


Albumin


 


 


 2.8 g/dL


(3.4-5.0)


 


Albumin/Globulin Ratio   0.6 (1.0-1.7) 








Laboratory Tests








Test


 6/12/21


13:05 6/13/21


04:30


 


Urine Opiates Screen Pos (NEG)  


 


Urine Methadone Screen Neg (NEG)  


 


Urine Barbiturates Neg (NEG)  


 


Urine Phencyclidine Screen Neg (NEG)  


 


Urine


Amphetamine/Methamphetamine Neg (NEG) 


 





 


Urine Benzodiazepines Screen Neg (NEG)  


 


Urine Cocaine Screen Neg (NEG)  


 


Urine Cannabinoids Screen Neg (NEG)  


 


Urine Ethyl Alcohol Neg (NEG)  


 


White Blood Count


 


 7.6 x10^3/uL


(4.0-11.0)


 


Red Blood Count


 


 4.15 x10^6/uL


(4.30-5.70)


 


Hemoglobin


 


 13.5 g/dL


(13.0-17.5)


 


Hematocrit


 


 40.0 %


(39.0-53.0)


 


Mean Corpuscular Volume  96 fL () 


 


Mean Corpuscular Hemoglobin  33 pg (25-35) 


 


Mean Corpuscular Hemoglobin


Concent 


 34 g/dL


(31-37)


 


Red Cell Distribution Width


 


 14.0 %


(11.5-14.5)


 


Platelet Count


 


 82 x10^3/uL


(140-400)


 


Neutrophils (%) (Auto)  88 % (31-73) 


 


Lymphocytes (%) (Auto)  3 % (24-48) 


 


Monocytes (%) (Auto)  9 % (0-9) 


 


Eosinophils (%) (Auto)  0 % (0-3) 


 


Basophils (%) (Auto)  0 % (0-3) 


 


Neutrophils # (Auto)


 


 6.7 x10^3/uL


(1.8-7.7)


 


Lymphocytes # (Auto)


 


 0.2 x10^3/uL


(1.0-4.8)


 


Monocytes # (Auto)


 


 0.7 x10^3/uL


(0.0-1.1)


 


Eosinophils # (Auto)


 


 0.0 x10^3/uL


(0.0-0.7)


 


Basophils # (Auto)


 


 0.0 x10^3/uL


(0.0-0.2)


 


Sodium Level


 


 140 mmol/L


(136-145)


 


Potassium Level


 


 3.1 mmol/L


(3.5-5.1)


 


Chloride Level


 


 101 mmol/L


()


 


Carbon Dioxide Level


 


 32 mmol/L


(21-32)


 


Anion Gap  7 (6-14) 


 


Blood Urea Nitrogen


 


 12 mg/dL


(8-26)


 


Creatinine


 


 0.5 mg/dL


(0.7-1.3)


 


Estimated GFR


(Cockcroft-Gault) 


 207.4 





 


BUN/Creatinine Ratio  24 (6-20) 


 


Glucose Level


 


 108 mg/dL


(70-99)


 


Calcium Level


 


 9.8 mg/dL


(8.5-10.1)


 


Total Bilirubin


 


 1.0 mg/dL


(0.2-1.0)


 


Aspartate Amino Transf


(AST/SGOT) 


 81 U/L (15-37) 





 


Alanine Aminotransferase


(ALT/SGPT) 


 86 U/L (16-63) 





 


Alkaline Phosphatase


 


 60 U/L


()


 


Total Protein


 


 7.5 g/dL


(6.4-8.2)


 


Albumin


 


 2.8 g/dL


(3.4-5.0)


 


Albumin/Globulin Ratio  0.6 (1.0-1.7) 








Medications





Active Scripts








 Medications  Dose


 Route/Sig


 Max Daily Dose Days Date Category


 


 Metoprolol


 Succinate ( Xl )


  (Metoprolol


 Succinate) 25 Mg


 Tab.er.24h  25 Mg


 PO DAILY


   6/8/21 Reported


 


 Hydrochlorothiazide


 Tablet


  (Hydrochlorothiazide)


 12.5 Mg Tablet  12.5 Mg


 PO DAILY


   6/8/21 Reported


 


 Ventolin Hfa


 Inhaler


  (Albuterol


 Sulfate) 18 Gm


 Hfa.aer.ad  2 Puff


 INH PRN Q4HRS PRN


   8/15/19 Reported


 


 Amlodipine


 Besylate 10 Mg


 Tablet  10 Mg


 PO DAILY


  30 9/29/17 Rx








Comments


CT chest 


IMPRESSION:


  


1. New left lower lobe dependent consolidation, which may represent infection or

aspiration.





2. Stable right apical cavitation with intracavitary lesion which could 

represent aspergilloma. Additional right apical spiculated 2.4 cm nodule is 

stable from recent exams, but decreased in size from 2013.





Impression


.


ASSESSMENT:


1.  Acute respiratory failure secondary to angioedema from lisinopril.


2.  ACE inhibitor induced angioedema--improving, now S/P trach 


3.  Hypertension.


4.  Chronic obstructive pulmonary disease.


5.  Abnormal chest x-ray revealing opacity, chronic in the right upper lobe, 


will review previous radiographic studies. If the patient has not had a CT in 


the past 12 months, we will obtain CT chest.





Plan


.


Updated 6/13/2021


Continue supplemental oxygen to keep sats above 92%, T-shield at 33%


Follow chest x-ray as needed


CT chest reviewed


Follow surgery recs-- will need trach for approx. week


CIWA for ETOH withdraw, Continue precedex gtt 


NEBS


Follow ID recs for ABX, Rocephin, Flagyl, last AFB in 2019 and Aspergillosis in 

2017, may need bronchoscopy


Continue steroids


Hypertension Per PCP


Physical therapy/Occupational Therapy/ST recs-- NPO, cont. PPN 


DVT/GI PPX: Lovenox


D/W RN and RT








PLAN:6/12/21


Continue supplemental oxygen to keep sats above 92%, T-shield during the day, 

Vent support PRN 


Follow CXR/ABG


CT chest reviewed


Follow surgery recs-- will need trach for approx. week


CIWA for ETOH withdraw, Continue precedex gtt 


NEBS


Follow ID recs for ABX, D/W ID that he may need repeat bronchoscopy 


Continue steroids 


ST recs-- NPO, cont. PPN 


DVT/GI PPX 





D/W RN and RT











NADIA ANNE MD              Jun 13, 2021 07:55

## 2021-06-14 VITALS — DIASTOLIC BLOOD PRESSURE: 100 MMHG | SYSTOLIC BLOOD PRESSURE: 183 MMHG

## 2021-06-14 VITALS — SYSTOLIC BLOOD PRESSURE: 183 MMHG | DIASTOLIC BLOOD PRESSURE: 115 MMHG

## 2021-06-14 VITALS — SYSTOLIC BLOOD PRESSURE: 122 MMHG | DIASTOLIC BLOOD PRESSURE: 78 MMHG

## 2021-06-14 VITALS — DIASTOLIC BLOOD PRESSURE: 89 MMHG | SYSTOLIC BLOOD PRESSURE: 144 MMHG

## 2021-06-14 VITALS — DIASTOLIC BLOOD PRESSURE: 79 MMHG | SYSTOLIC BLOOD PRESSURE: 116 MMHG

## 2021-06-14 VITALS — SYSTOLIC BLOOD PRESSURE: 159 MMHG | DIASTOLIC BLOOD PRESSURE: 102 MMHG

## 2021-06-14 VITALS — SYSTOLIC BLOOD PRESSURE: 156 MMHG | DIASTOLIC BLOOD PRESSURE: 93 MMHG

## 2021-06-14 VITALS — DIASTOLIC BLOOD PRESSURE: 63 MMHG | SYSTOLIC BLOOD PRESSURE: 139 MMHG

## 2021-06-14 VITALS — DIASTOLIC BLOOD PRESSURE: 96 MMHG | SYSTOLIC BLOOD PRESSURE: 145 MMHG

## 2021-06-14 VITALS — DIASTOLIC BLOOD PRESSURE: 84 MMHG | SYSTOLIC BLOOD PRESSURE: 130 MMHG

## 2021-06-14 VITALS — DIASTOLIC BLOOD PRESSURE: 94 MMHG | SYSTOLIC BLOOD PRESSURE: 166 MMHG

## 2021-06-14 VITALS — DIASTOLIC BLOOD PRESSURE: 60 MMHG | SYSTOLIC BLOOD PRESSURE: 106 MMHG

## 2021-06-14 VITALS — SYSTOLIC BLOOD PRESSURE: 164 MMHG | DIASTOLIC BLOOD PRESSURE: 109 MMHG

## 2021-06-14 VITALS — SYSTOLIC BLOOD PRESSURE: 142 MMHG | DIASTOLIC BLOOD PRESSURE: 56 MMHG

## 2021-06-14 VITALS — SYSTOLIC BLOOD PRESSURE: 146 MMHG | DIASTOLIC BLOOD PRESSURE: 95 MMHG

## 2021-06-14 VITALS — DIASTOLIC BLOOD PRESSURE: 91 MMHG | SYSTOLIC BLOOD PRESSURE: 144 MMHG

## 2021-06-14 VITALS — SYSTOLIC BLOOD PRESSURE: 133 MMHG | DIASTOLIC BLOOD PRESSURE: 88 MMHG

## 2021-06-14 VITALS — SYSTOLIC BLOOD PRESSURE: 141 MMHG | DIASTOLIC BLOOD PRESSURE: 90 MMHG

## 2021-06-14 VITALS — SYSTOLIC BLOOD PRESSURE: 147 MMHG | DIASTOLIC BLOOD PRESSURE: 97 MMHG

## 2021-06-14 VITALS — SYSTOLIC BLOOD PRESSURE: 169 MMHG | DIASTOLIC BLOOD PRESSURE: 104 MMHG

## 2021-06-14 VITALS — SYSTOLIC BLOOD PRESSURE: 184 MMHG | DIASTOLIC BLOOD PRESSURE: 101 MMHG

## 2021-06-14 VITALS — DIASTOLIC BLOOD PRESSURE: 90 MMHG | SYSTOLIC BLOOD PRESSURE: 142 MMHG

## 2021-06-14 VITALS — DIASTOLIC BLOOD PRESSURE: 85 MMHG | SYSTOLIC BLOOD PRESSURE: 145 MMHG

## 2021-06-14 VITALS — DIASTOLIC BLOOD PRESSURE: 97 MMHG | SYSTOLIC BLOOD PRESSURE: 164 MMHG

## 2021-06-14 LAB
ALBUMIN SERPL-MCNC: 2.1 G/DL (ref 3.4–5)
ALBUMIN/GLOB SERPL: 0.5 {RATIO} (ref 1–1.7)
ALP SERPL-CCNC: 55 U/L (ref 46–116)
ALT SERPL-CCNC: 61 U/L (ref 16–63)
ANION GAP SERPL CALC-SCNC: 8 MMOL/L (ref 6–14)
AST SERPL-CCNC: 43 U/L (ref 15–37)
BASOPHILS # BLD AUTO: 0 X10^3/UL (ref 0–0.2)
BASOPHILS NFR BLD: 0 % (ref 0–3)
BILIRUB SERPL-MCNC: 0.5 MG/DL (ref 0.2–1)
BUN SERPL-MCNC: 12 MG/DL (ref 8–26)
BUN/CREAT SERPL: 24 (ref 6–20)
CALCIUM SERPL-MCNC: 9.5 MG/DL (ref 8.5–10.1)
CHLORIDE SERPL-SCNC: 106 MMOL/L (ref 98–107)
CO2 SERPL-SCNC: 28 MMOL/L (ref 21–32)
CREAT SERPL-MCNC: 0.5 MG/DL (ref 0.7–1.3)
EOSINOPHIL NFR BLD: 0 % (ref 0–3)
EOSINOPHIL NFR BLD: 0 X10^3/UL (ref 0–0.7)
ERYTHROCYTE [DISTWIDTH] IN BLOOD BY AUTOMATED COUNT: 14.1 % (ref 11.5–14.5)
GFR SERPLBLD BASED ON 1.73 SQ M-ARVRAT: 207.4 ML/MIN
GLUCOSE SERPL-MCNC: 119 MG/DL (ref 70–99)
HCT VFR BLD CALC: 37.5 % (ref 39–53)
HGB BLD-MCNC: 12.7 G/DL (ref 13–17.5)
LYMPHOCYTES # BLD: 0.4 X10^3/UL (ref 1–4.8)
LYMPHOCYTES NFR BLD AUTO: 5 % (ref 24–48)
MCH RBC QN AUTO: 33 PG (ref 25–35)
MCHC RBC AUTO-ENTMCNC: 34 G/DL (ref 31–37)
MCV RBC AUTO: 96 FL (ref 79–100)
MONO #: 1.4 X10^3/UL (ref 0–1.1)
MONOCYTES NFR BLD: 19 % (ref 0–9)
NEUT #: 5.6 X10^3/UL (ref 1.8–7.7)
NEUTROPHILS NFR BLD AUTO: 75 % (ref 31–73)
PLATELET # BLD AUTO: 89 X10^3/UL (ref 140–400)
POTASSIUM SERPL-SCNC: 3.4 MMOL/L (ref 3.5–5.1)
PROT SERPL-MCNC: 6.6 G/DL (ref 6.4–8.2)
RBC # BLD AUTO: 3.91 X10^6/UL (ref 4.3–5.7)
SODIUM SERPL-SCNC: 142 MMOL/L (ref 136–145)
WBC # BLD AUTO: 7.4 X10^3/UL (ref 4–11)

## 2021-06-14 RX ADMIN — ENOXAPARIN SODIUM SCH MG: 40 INJECTION SUBCUTANEOUS at 21:03

## 2021-06-14 RX ADMIN — OLANZAPINE SCH MG: 10 INJECTION, POWDER, FOR SOLUTION INTRAMUSCULAR at 08:51

## 2021-06-14 RX ADMIN — IPRATROPIUM BROMIDE AND ALBUTEROL SULFATE SCH ML: .5; 3 SOLUTION RESPIRATORY (INHALATION) at 20:18

## 2021-06-14 RX ADMIN — HYDRALAZINE HYDROCHLORIDE PRN MG: 20 INJECTION INTRAMUSCULAR; INTRAVENOUS at 00:17

## 2021-06-14 RX ADMIN — IPRATROPIUM BROMIDE AND ALBUTEROL SULFATE SCH ML: .5; 3 SOLUTION RESPIRATORY (INHALATION) at 12:03

## 2021-06-14 RX ADMIN — METHYLPREDNISOLONE SODIUM SUCCINATE SCH MG: 40 INJECTION, POWDER, FOR SOLUTION INTRAMUSCULAR; INTRAVENOUS at 08:50

## 2021-06-14 RX ADMIN — FOLIC ACID SCH MLS/HR: 5 INJECTION, SOLUTION INTRAMUSCULAR; INTRAVENOUS; SUBCUTANEOUS at 09:15

## 2021-06-14 RX ADMIN — PIPERACILLIN SODIUM AND TAZOBACTAM SODIUM SCH MLS/HR: 3; .375 INJECTION, POWDER, LYOPHILIZED, FOR SOLUTION INTRAVENOUS at 23:36

## 2021-06-14 RX ADMIN — DOXYCYCLINE SCH MLS/HR: 100 INJECTION, POWDER, LYOPHILIZED, FOR SOLUTION INTRAVENOUS at 08:54

## 2021-06-14 RX ADMIN — PIPERACILLIN SODIUM AND TAZOBACTAM SODIUM SCH MLS/HR: 3; .375 INJECTION, POWDER, LYOPHILIZED, FOR SOLUTION INTRAVENOUS at 11:12

## 2021-06-14 RX ADMIN — DOXYCYCLINE SCH MLS/HR: 100 INJECTION, POWDER, LYOPHILIZED, FOR SOLUTION INTRAVENOUS at 21:03

## 2021-06-14 RX ADMIN — PIPERACILLIN SODIUM AND TAZOBACTAM SODIUM SCH MLS/HR: 3; .375 INJECTION, POWDER, LYOPHILIZED, FOR SOLUTION INTRAVENOUS at 17:51

## 2021-06-14 RX ADMIN — DEXMEDETOMIDINE HYDROCHLORIDE PRN MLS/HR: 100 INJECTION, SOLUTION, CONCENTRATE INTRAVENOUS at 05:44

## 2021-06-14 RX ADMIN — DEXMEDETOMIDINE HYDROCHLORIDE PRN MLS/HR: 100 INJECTION, SOLUTION, CONCENTRATE INTRAVENOUS at 21:13

## 2021-06-14 RX ADMIN — IPRATROPIUM BROMIDE AND ALBUTEROL SULFATE SCH ML: .5; 3 SOLUTION RESPIRATORY (INHALATION) at 15:39

## 2021-06-14 RX ADMIN — GLYCERIN, ISOLEUCINE, LEUCINE, LYSINE, METHIONINE, PHENYLALANINE, THREONINE, TRYPTOPHAN, VALINE, ALANINE, GLYCINE, ARGININE, HISTIDINE, PROLINE, SERINE, CYSTEINE, SODIUM ACETATE, MAGNESIUM ACETATE, CALCIUM ACETATE, SODIUM CHLORIDE, POTASSIUM CHLORIDE, PHOSPHORIC ACID, AND POTASSIUM METABISULFITE SCH MLS/HR
3; .21; .27; .22; .16; .17; .12; .046; .2; .21; .42; .29; .085; .34; .18; .014; .2; .054; .026; .12; .15; .041 INJECTION INTRAVENOUS at 21:02

## 2021-06-14 RX ADMIN — FAMOTIDINE SCH MG: 10 INJECTION, SOLUTION INTRAVENOUS at 08:50

## 2021-06-14 RX ADMIN — IPRATROPIUM BROMIDE AND ALBUTEROL SULFATE SCH ML: .5; 3 SOLUTION RESPIRATORY (INHALATION) at 08:47

## 2021-06-14 RX ADMIN — HALOPERIDOL LACTATE PRN MG: 5 INJECTION, SOLUTION INTRAMUSCULAR at 15:34

## 2021-06-14 RX ADMIN — FAMOTIDINE SCH MG: 10 INJECTION, SOLUTION INTRAVENOUS at 21:03

## 2021-06-14 RX ADMIN — DEXMEDETOMIDINE HYDROCHLORIDE PRN MLS/HR: 100 INJECTION, SOLUTION, CONCENTRATE INTRAVENOUS at 15:39

## 2021-06-14 RX ADMIN — GLYCERIN, ISOLEUCINE, LEUCINE, LYSINE, METHIONINE, PHENYLALANINE, THREONINE, TRYPTOPHAN, VALINE, ALANINE, GLYCINE, ARGININE, HISTIDINE, PROLINE, SERINE, CYSTEINE, SODIUM ACETATE, MAGNESIUM ACETATE, CALCIUM ACETATE, SODIUM CHLORIDE, POTASSIUM CHLORIDE, PHOSPHORIC ACID, AND POTASSIUM METABISULFITE SCH MLS/HR
3; .21; .27; .22; .16; .17; .12; .046; .2; .21; .42; .29; .085; .34; .18; .014; .2; .054; .026; .12; .15; .041 INJECTION INTRAVENOUS at 07:20

## 2021-06-14 RX ADMIN — HYDRALAZINE HYDROCHLORIDE PRN MG: 20 INJECTION INTRAMUSCULAR; INTRAVENOUS at 17:47

## 2021-06-14 NOTE — PDOC
[facility] PROGRESS NOTE


Date of Service


DOS:


DATE: 6/14/21 


TIME: 13:20





Chief Complaint


Chief Complaint


Angioedema


Systolic CHF


Hypertension





Plan:


Patient seen epinephrine and Solu-Medrol in ED with noted improvement


We will continue treatment with Benadryl 25 mg every 4 hr , Solu-Medrol 60 mg 

every 6 hours, epinephrine IM as needed


If symptoms are worsening or not improving with epinephrine may provide 

tranexamic acid 1 g IV


Patient will need to discontinue lisinopril upon discharge and be prescribed 

losartan due to systolic CHF with ejection fraction 35-40%;


Admit to ICU for monitoring and hopefully discharge tomorrow when noted 

improvement


FEN - Cardiac diet as tolerated


PPX  - Lovenox


FULL CODE


Dispo - admit to ICU for further monitoring





History of Present Illness


History of Present Illness


Patient is a 55-year-old male past medical history hypertension, who presents to

the ED with complaints of lip swelling that began this morning.  Patient was 

recently discharged from our service and during that time he had echocardiogram 

that showed systolic function is mildly to moderately impaired, with Ejection 

Fraction is 35-40%; there is global hypokinesis of the left ventricle; septal 

motion suggestive of conduction defect.  He was initiated on lisinopril, Toprol-

XL, and amlodipine. Patient reportedly took his prescribed lisinopril today and 

woke up from a nap with noted swelling to his lips and left side of his face.  

He initially denied any history of similar symptoms to the ED attending, but 

when family was present she did note he has a history of similar reaction to his

face and lips about 2-3 years ago.  This reaction responded with medications and

they never did figure out what the cause of this reaction was at that time.  

Denies any family history of angioedema.  Treated with Solu-Medrol and some 

epinephrine in the ER.  Will admit patient for monitoring overnight





6/9/2021: Due to concerns of swelling to tongue and compromised airway, 

anesthesia was consulted to help perform tracheostomy.  Patient currently on 

vent, FiO2 40%, PEEP 5.  Had discussion with wife at bedside, she states that 

history of similar episode occurred roughly 3 years ago and she believes this 

reaction was after taking a blood pressure medication she cannot recall.  We 

will continue supportive care.  Due to some steroid-induced hyperglycemia will 

initiate insulin treatment.  2.  Time 30 minutes spent reviewing charts, 

reviewing labs, reviewing imaging, discussion with wife bedside, discussion with

pulmonology, and discussion with RN.





6/10/2021: Afebrile, no acute overnight.  Remains on vent with FiO2 40%.  Plan 

for vacation sedation tomorrow morning.   Critical care time 30 minutes spent 

reviewing charts, reviewing labs, reviewing imaging, discussion with family, and

discussion with RN.





6/11/2021: No acute events overnight.  On trach collar with FiO2 35%, PEEP 5.  

Afebrile.  Sedation vacation planned today.  Critical care time 30 minutes spent

reviewing charts, reviewing labs, formulating discharge plan, discussion with 

RN.





60 1421


No acute events overnight.  Continues to be on trach ventilation.  Tolerating 

well without any complications.  No concerns from nursing.  Plan for possible 

bronchoscopy this week per pulmonology.  Will start on Zosyn per ID.  Patient's 

chart, labs, images were reviewed and discussed with RN





Vitals/I&O


Vitals/I&O:





                                   Vital Signs








  Date Time  Temp Pulse Resp B/P (MAP) Pulse Ox O2 Delivery O2 Flow Rate FiO2


 


6/14/21 13:15  85 22 144/89 (107) 100 Tracheal Collar 8.0 


 


6/14/21 12:07 97.5       





 97.5       














                                    I & O   


 


 6/13/21 6/13/21 6/14/21





 15:00 23:00 07:00


 


Intake Total  3047 ml 857.2 ml


 


Output Total 725 ml 1475 ml 660 ml


 


Balance -725 ml 1572 ml 197.2 ml











Physical Exam


Physical Exam:


GENERAL:  Pt opens eyes, drowsy, Appears comfortable


HEENT:  Pupils small.  Normal conjunctivae.  Oral cavity pink.


NECK:  Tracheostomy.


LUNGS:  scattered Rhonchi.  No accessory muscle use.


HEART:  S1 and S2, regular.


ABDOMEN:  Nondistended, soft.  No grimace or guarding to palpation.


GENITOURINARY:  Indwelling Ramirez in place.


EXTREMITIES:  No gross edema or cyanosis. Mitts


SKIN:  Warm to touch.  No signs of rash.  Peripheral IVs look okay.


General:  No acute distress


Heart:  Regular rate


Lungs:  Other (Coarse breath sounds)


Abdomen:  Normal bowel sounds, Soft


Extremities:  No clubbing, No cyanosis


Skin:  No rashes, No breakdown, Other (Tracheostomy in place)





Labs


Labs:





Laboratory Tests








Test


 6/14/21


06:25


 


White Blood Count


 7.4 x10^3/uL


(4.0-11.0)


 


Red Blood Count


 3.91 x10^6/uL


(4.30-5.70)


 


Hemoglobin


 12.7 g/dL


(13.0-17.5)


 


Hematocrit


 37.5 %


(39.0-53.0)


 


Mean Corpuscular Volume 96 fL () 


 


Mean Corpuscular Hemoglobin 33 pg (25-35) 


 


Mean Corpuscular Hemoglobin


Concent 34 g/dL


(31-37)


 


Red Cell Distribution Width


 14.1 %


(11.5-14.5)


 


Platelet Count


 89 x10^3/uL


(140-400)


 


Neutrophils (%) (Auto) 75 % (31-73) 


 


Lymphocytes (%) (Auto) 5 % (24-48) 


 


Monocytes (%) (Auto) 19 % (0-9) 


 


Eosinophils (%) (Auto) 0 % (0-3) 


 


Basophils (%) (Auto) 0 % (0-3) 


 


Neutrophils # (Auto)


 5.6 x10^3/uL


(1.8-7.7)


 


Lymphocytes # (Auto)


 0.4 x10^3/uL


(1.0-4.8)


 


Monocytes # (Auto)


 1.4 x10^3/uL


(0.0-1.1)


 


Eosinophils # (Auto)


 0.0 x10^3/uL


(0.0-0.7)


 


Basophils # (Auto)


 0.0 x10^3/uL


(0.0-0.2)


 


Sodium Level


 142 mmol/L


(136-145)


 


Potassium Level


 3.4 mmol/L


(3.5-5.1)


 


Chloride Level


 106 mmol/L


()


 


Carbon Dioxide Level


 28 mmol/L


(21-32)


 


Anion Gap 8 (6-14) 


 


Blood Urea Nitrogen


 12 mg/dL


(8-26)


 


Creatinine


 0.5 mg/dL


(0.7-1.3)


 


Estimated GFR


(Cockcroft-Gault) 207.4 





 


BUN/Creatinine Ratio 24 (6-20) 


 


Glucose Level


 119 mg/dL


(70-99)


 


Calcium Level


 9.5 mg/dL


(8.5-10.1)


 


Total Bilirubin


 0.5 mg/dL


(0.2-1.0)


 


Aspartate Amino Transf


(AST/SGOT) 43 U/L (15-37) 





 


Alanine Aminotransferase


(ALT/SGPT) 61 U/L (16-63) 





 


Alkaline Phosphatase


 55 U/L


()


 


Total Protein


 6.6 g/dL


(6.4-8.2)


 


Albumin


 2.1 g/dL


(3.4-5.0)


 


Albumin/Globulin Ratio 0.5 (1.0-1.7) 











Assessment and Plan


Assessmemt and Plan


Problems


Medical Problems:


(1) ACE inhibitor-aggravated angioedema


Status: Acute  











Comment


Review of Relevant


I have reviewed the following items elizabeth (where applicable) has been applied.


Medications:





Current Medications








 Medications


  (Trade)  Dose


 Ordered  Sig/Yg


 Route


 PRN Reason  Start Time


 Stop Time Status Last Admin


Dose Admin


 


 Piperacillin Sod/


 Tazobactam Sod


 3.375 gm/Sodium


 Chloride  50 ml @ 


 100 mls/hr  Q6HRS


 IV


   6/14/21 12:00


    6/14/21 11:12














Justifications for Admission


Other Justification


Angioedema











SHIRLEY HE MD                  Jun 14, 2021 13:21

## 2021-06-14 NOTE — PDOC
PULMONARY PROGRESS NOTES


DATE: 6/14/21 


TIME: 10:05


Subjective


remains on trach shield at 33%


Sedated on Precedex drip


nursing reports episodes of combativeness


Vitals





Vital Signs








  Date Time  Temp Pulse Resp B/P (MAP) Pulse Ox O2 Delivery O2 Flow Rate FiO2


 


6/14/21 09:23  74 28 159/102 (121) 99 Tracheal Collar 8.0 


 


6/14/21 07:02 98.0       





 98.0       








Comments


Tracheostomy,


HEENT:  Other (trach midline )


Lungs:  Other (Coarse breath sounds)


Cardiovascular:  S1


Abdomen:  Soft


Extremities:  No Edema


Labs





Laboratory Tests








Test


 6/12/21


13:05 6/13/21


04:30 6/14/21


06:25


 


Urine Opiates Screen Pos (NEG)   


 


Urine Methadone Screen Neg (NEG)   


 


Urine Barbiturates Neg (NEG)   


 


Urine Phencyclidine Screen Neg (NEG)   


 


Urine


Amphetamine/Methamphetamine Neg (NEG) 


 


 





 


Urine Benzodiazepines Screen Neg (NEG)   


 


Urine Cocaine Screen Neg (NEG)   


 


Urine Cannabinoids Screen Neg (NEG)   


 


Urine Ethyl Alcohol Neg (NEG)   


 


White Blood Count


 


 7.6 x10^3/uL


(4.0-11.0) 7.4 x10^3/uL


(4.0-11.0)


 


Red Blood Count


 


 4.15 x10^6/uL


(4.30-5.70) 3.91 x10^6/uL


(4.30-5.70)


 


Hemoglobin


 


 13.5 g/dL


(13.0-17.5) 12.7 g/dL


(13.0-17.5)


 


Hematocrit


 


 40.0 %


(39.0-53.0) 37.5 %


(39.0-53.0)


 


Mean Corpuscular Volume  96 fL ()  96 fL () 


 


Mean Corpuscular Hemoglobin  33 pg (25-35)  33 pg (25-35) 


 


Mean Corpuscular Hemoglobin


Concent 


 34 g/dL


(31-37) 34 g/dL


(31-37)


 


Red Cell Distribution Width


 


 14.0 %


(11.5-14.5) 14.1 %


(11.5-14.5)


 


Platelet Count


 


 82 x10^3/uL


(140-400) 89 x10^3/uL


(140-400)


 


Neutrophils (%) (Auto)  88 % (31-73)  75 % (31-73) 


 


Lymphocytes (%) (Auto)  3 % (24-48)  5 % (24-48) 


 


Monocytes (%) (Auto)  9 % (0-9)  19 % (0-9) 


 


Eosinophils (%) (Auto)  0 % (0-3)  0 % (0-3) 


 


Basophils (%) (Auto)  0 % (0-3)  0 % (0-3) 


 


Neutrophils # (Auto)


 


 6.7 x10^3/uL


(1.8-7.7) 5.6 x10^3/uL


(1.8-7.7)


 


Lymphocytes # (Auto)


 


 0.2 x10^3/uL


(1.0-4.8) 0.4 x10^3/uL


(1.0-4.8)


 


Monocytes # (Auto)


 


 0.7 x10^3/uL


(0.0-1.1) 1.4 x10^3/uL


(0.0-1.1)


 


Eosinophils # (Auto)


 


 0.0 x10^3/uL


(0.0-0.7) 0.0 x10^3/uL


(0.0-0.7)


 


Basophils # (Auto)


 


 0.0 x10^3/uL


(0.0-0.2) 0.0 x10^3/uL


(0.0-0.2)


 


Sodium Level


 


 140 mmol/L


(136-145) 142 mmol/L


(136-145)


 


Potassium Level


 


 3.1 mmol/L


(3.5-5.1) 3.4 mmol/L


(3.5-5.1)


 


Chloride Level


 


 101 mmol/L


() 106 mmol/L


()


 


Carbon Dioxide Level


 


 32 mmol/L


(21-32) 28 mmol/L


(21-32)


 


Anion Gap  7 (6-14)  8 (6-14) 


 


Blood Urea Nitrogen


 


 12 mg/dL


(8-26) 12 mg/dL


(8-26)


 


Creatinine


 


 0.5 mg/dL


(0.7-1.3) 0.5 mg/dL


(0.7-1.3)


 


Estimated GFR


(Cockcroft-Gault) 


 207.4 


 207.4 





 


BUN/Creatinine Ratio  24 (6-20)  24 (6-20) 


 


Glucose Level


 


 108 mg/dL


(70-99) 119 mg/dL


(70-99)


 


Calcium Level


 


 9.8 mg/dL


(8.5-10.1) 9.5 mg/dL


(8.5-10.1)


 


Total Bilirubin


 


 1.0 mg/dL


(0.2-1.0) 0.5 mg/dL


(0.2-1.0)


 


Aspartate Amino Transf


(AST/SGOT) 


 81 U/L (15-37) 


 43 U/L (15-37) 





 


Alanine Aminotransferase


(ALT/SGPT) 


 86 U/L (16-63) 


 61 U/L (16-63) 





 


Alkaline Phosphatase


 


 60 U/L


() 55 U/L


()


 


Total Protein


 


 7.5 g/dL


(6.4-8.2) 6.6 g/dL


(6.4-8.2)


 


Albumin


 


 2.8 g/dL


(3.4-5.0) 2.1 g/dL


(3.4-5.0)


 


Albumin/Globulin Ratio  0.6 (1.0-1.7)  0.5 (1.0-1.7) 








Laboratory Tests








Test


 6/14/21


06:25


 


White Blood Count


 7.4 x10^3/uL


(4.0-11.0)


 


Red Blood Count


 3.91 x10^6/uL


(4.30-5.70)


 


Hemoglobin


 12.7 g/dL


(13.0-17.5)


 


Hematocrit


 37.5 %


(39.0-53.0)


 


Mean Corpuscular Volume 96 fL () 


 


Mean Corpuscular Hemoglobin 33 pg (25-35) 


 


Mean Corpuscular Hemoglobin


Concent 34 g/dL


(31-37)


 


Red Cell Distribution Width


 14.1 %


(11.5-14.5)


 


Platelet Count


 89 x10^3/uL


(140-400)


 


Neutrophils (%) (Auto) 75 % (31-73) 


 


Lymphocytes (%) (Auto) 5 % (24-48) 


 


Monocytes (%) (Auto) 19 % (0-9) 


 


Eosinophils (%) (Auto) 0 % (0-3) 


 


Basophils (%) (Auto) 0 % (0-3) 


 


Neutrophils # (Auto)


 5.6 x10^3/uL


(1.8-7.7)


 


Lymphocytes # (Auto)


 0.4 x10^3/uL


(1.0-4.8)


 


Monocytes # (Auto)


 1.4 x10^3/uL


(0.0-1.1)


 


Eosinophils # (Auto)


 0.0 x10^3/uL


(0.0-0.7)


 


Basophils # (Auto)


 0.0 x10^3/uL


(0.0-0.2)


 


Sodium Level


 142 mmol/L


(136-145)


 


Potassium Level


 3.4 mmol/L


(3.5-5.1)


 


Chloride Level


 106 mmol/L


()


 


Carbon Dioxide Level


 28 mmol/L


(21-32)


 


Anion Gap 8 (6-14) 


 


Blood Urea Nitrogen


 12 mg/dL


(8-26)


 


Creatinine


 0.5 mg/dL


(0.7-1.3)


 


Estimated GFR


(Cockcroft-Gault) 207.4 





 


BUN/Creatinine Ratio 24 (6-20) 


 


Glucose Level


 119 mg/dL


(70-99)


 


Calcium Level


 9.5 mg/dL


(8.5-10.1)


 


Total Bilirubin


 0.5 mg/dL


(0.2-1.0)


 


Aspartate Amino Transf


(AST/SGOT) 43 U/L (15-37) 





 


Alanine Aminotransferase


(ALT/SGPT) 61 U/L (16-63) 





 


Alkaline Phosphatase


 55 U/L


()


 


Total Protein


 6.6 g/dL


(6.4-8.2)


 


Albumin


 2.1 g/dL


(3.4-5.0)


 


Albumin/Globulin Ratio 0.5 (1.0-1.7) 








Medications





Active Scripts








 Medications  Dose


 Route/Sig


 Max Daily Dose Days Date Category


 


 Metoprolol


 Succinate ( Xl )


  (Metoprolol


 Succinate) 25 Mg


 Tab.er.24h  25 Mg


 PO DAILY


   6/8/21 Reported


 


 Hydrochlorothiazide


 Tablet


  (Hydrochlorothiazide)


 12.5 Mg Tablet  12.5 Mg


 PO DAILY


   6/8/21 Reported


 


 Ventolin Hfa


 Inhaler


  (Albuterol


 Sulfate) 18 Gm


 Hfa.aer.ad  2 Puff


 INH PRN Q4HRS PRN


   8/15/19 Reported


 


 Amlodipine


 Besylate 10 Mg


 Tablet  10 Mg


 PO DAILY


  30 9/29/17 Rx








Comments


CT chest 


IMPRESSION:


  


1. New left lower lobe dependent consolidation, which may represent infection or

aspiration.





2. Stable right apical cavitation with intracavitary lesion which could 

represent aspergilloma. Additional right apical spiculated 2.4 cm nodule is 

stable from recent exams, but decreased in size from 2013.





Impression


.


ASSESSMENT:


1.  Acute respiratory failure secondary to angioedema from lisinopril.


2.  ACE inhibitor induced angioedema--improving, now S/P trach 


3.  Hypertension.


4.  Chronic obstructive pulmonary disease.


5.  Abnormal chest x-ray revealing opacity, chronic in the right upper lobe, 


will review previous radiographic studies. If the patient has not had a CT in 


the past 12 months, we will obtain CT chest.





Plan


.


Updated 6/14/2021


Continue supplemental oxygen to keep sats above 92%, T-shield at 33%


Follow surgery recs-- will need trach for approx. week


CIWA for ETOH withdraw, Continue precedex gtt, PRN ativan 


NEBS


Follow ID recs for ABX, Rocephin, Flagyl, last AFB in 2019 and Aspergillosis in 

2017, may need bronchoscopy


Continue steroids IV 


Physical therapy/Occupational Therapy/ST recs-- NPO,


Continue PPN for nutritional support  


DVT/GI PPX: Lovenox


D/W RN and RT











Updated 6/13/2021


Continue supplemental oxygen to keep sats above 92%, T-shield at 33%


Follow chest x-ray as needed


CT chest reviewed


Follow surgery recs-- will need trach for approx. week


CIWA for ETOH withdraw, Continue precedex gtt 


NEBS


Follow ID recs for ABX, Rocephin, Flagyl, last AFB in 2019 and Aspergillosis in 

2017, may need bronchoscopy


Continue steroids


Hypertension Per PCP


Physical therapy/Occupational Therapy/ST recs-- NPO, cont. PPN 


DVT/GI PPX: Lovenox


D/W RN and RT








PLAN:6/12/21


Continue supplemental oxygen to keep sats above 92%, T-shield during the day, 

Vent support PRN 


Follow CXR/ABG


CT chest reviewed


Follow surgery recs-- will need trach for approx. week


CIWA for ETOH withdraw, Continue precedex gtt 


NEBS


Follow ID recs for ABX, D/W ID that he may need repeat bronchoscopy 


Continue steroids 


ST recs-- NPO, cont. PPN 


DVT/GI PPX 





D/W RN and RT











NADIA ANNE MD              Jun 14, 2021 10:06

## 2021-06-14 NOTE — PDOC
Infectious Disease Note


Subjective:


Subjective


Pt drowsy


 Continue precedex gtt 


 CIWA for ETOH withdrawal, 


Low temp charted,entered by error per d/w rn, 


 cont to have trach secretions,dark colored





Vital Signs:


Vital Signs





Vital Signs








  Date Time  Temp Pulse Resp B/P (MAP) Pulse Ox O2 Delivery O2 Flow Rate FiO2


 


6/14/21 07:02 98.0 61 16 141/90 (107) 98 Tracheal Collar 8.0 





 98.0       











Physical Exam:


PHYSICAL EXAM


GENERAL:  Pt opens eyes, drowsy, Appears comfortable


HEENT:  Pupils small.  Normal conjunctivae.  Oral cavity pink.


NECK:  Tracheostomy.


LUNGS:  scattered Rhonchi.  No accessory muscle use.


HEART:  S1 and S2, regular.


ABDOMEN:  Nondistended, soft.  No grimace or guarding to palpation.


GENITOURINARY:  Indwelling Ramirez in place.


EXTREMITIES:  No gross edema or cyanosis. Mitts


SKIN:  Warm to touch.  No signs of rash.  Peripheral IVs look okay.





Medications:


Inpatient Meds:


Medications reviewed.





Labs:


Lab





Laboratory Tests








Test


 6/14/21


06:25


 


White Blood Count


 7.4 x10^3/uL


(4.0-11.0)


 


Red Blood Count


 3.91 x10^6/uL


(4.30-5.70)


 


Hemoglobin


 12.7 g/dL


(13.0-17.5)


 


Hematocrit


 37.5 %


(39.0-53.0)


 


Mean Corpuscular Volume 96 fL () 


 


Mean Corpuscular Hemoglobin 33 pg (25-35) 


 


Mean Corpuscular Hemoglobin


Concent 34 g/dL


(31-37)


 


Red Cell Distribution Width


 14.1 %


(11.5-14.5)


 


Platelet Count


 89 x10^3/uL


(140-400)


 


Neutrophils (%) (Auto) 75 % (31-73) 


 


Lymphocytes (%) (Auto) 5 % (24-48) 


 


Monocytes (%) (Auto) 19 % (0-9) 


 


Eosinophils (%) (Auto) 0 % (0-3) 


 


Basophils (%) (Auto) 0 % (0-3) 


 


Neutrophils # (Auto)


 5.6 x10^3/uL


(1.8-7.7)


 


Lymphocytes # (Auto)


 0.4 x10^3/uL


(1.0-4.8)


 


Monocytes # (Auto)


 1.4 x10^3/uL


(0.0-1.1)


 


Eosinophils # (Auto)


 0.0 x10^3/uL


(0.0-0.7)


 


Basophils # (Auto)


 0.0 x10^3/uL


(0.0-0.2)











Objective:


Assessment:


1.  Chronic cavitation, right upper lobe lung lesion with LLL infiltrates


    differential would be broad with bacterial,fungal or mycobacterial including

nonmycobacterial vs noninfectious


2.  History of Mycobacterium tuberculosis, treated in 2008.  AFB negative in 


2017 and 2019. HIV negative 2017


3.  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


4.  Leukocytosis in part reactive to steroids.- better


5.  Angioedema from Lisinopril ;


    Acute respiratory failure, status post emergent tracheostomy on 06/09/2021.


6.  Hypertension.


7.  Chronic obstructive pulmonary disease.


8.  Tobaccoism and substance abuse.


9.  Alcohol abuse


10. Thrombocytopenia


11. PCM


12. Anemia





Plan:


Plan of Care


DC Rocephin and Flagyl 


Start Zosyn


Doxycycline and steroids per pulmonary


Last AFB in negative 2019 and Aspergillosis fumigatus positive in 2017


Pulmonary following, possible  Bronch 


Maintain aspiration precautions 


Discussed with nursing 








Critically ill.  





D/w nursing











STEPHANIE CLAYTON MD           Jun 14, 2021 08:03

## 2021-06-14 NOTE — NUR
Patient became combative while this RN was initiating an IV. Patient was trying to hit and 
kick. This RN called for assistance and 3 more RN assisted trying to calm patient down.  
Patient is on CIWA protocol. Ativan was administered and precedex rate increased.  This RN 
was able to place IV.

## 2021-06-14 NOTE — PDOC
SURGICAL PROGRESS NOTE


DATE: 6/14/21 


TIME: 19:08


Subjective


Pt sleeping on trach shield currently, but noted to have increased secretions 

and anxiety per nursing


Vital Signs





Vital Signs








  Date Time  Temp Pulse Resp B/P (MAP) Pulse Ox O2 Delivery O2 Flow Rate FiO2


 


6/14/21 18:05  62 22 122/78 (93) 98 Tracheal Collar 8.0 


 


6/14/21 16:04 97.6       





 97.6       








I&O











Intake and Output 


 


 6/14/21





 07:00


 


Intake Total 3904.2 ml


 


Output Total 2860 ml


 


Balance 1044.2 ml


 


 


 


IV Total 3854.2 ml


 


Blood Product IV Normal Saline Flush 50 ml


 


Output Urine Total 2860 ml








HEENT:  Other (trach intact)


Labs





Laboratory Tests








Test


 6/13/21


04:30 6/14/21


06:25


 


White Blood Count


 7.6 x10^3/uL


(4.0-11.0) 7.4 x10^3/uL


(4.0-11.0)


 


Red Blood Count


 4.15 x10^6/uL


(4.30-5.70) 3.91 x10^6/uL


(4.30-5.70)


 


Hemoglobin


 13.5 g/dL


(13.0-17.5) 12.7 g/dL


(13.0-17.5)


 


Hematocrit


 40.0 %


(39.0-53.0) 37.5 %


(39.0-53.0)


 


Mean Corpuscular Volume 96 fL ()  96 fL () 


 


Mean Corpuscular Hemoglobin 33 pg (25-35)  33 pg (25-35) 


 


Mean Corpuscular Hemoglobin


Concent 34 g/dL


(31-37) 34 g/dL


(31-37)


 


Red Cell Distribution Width


 14.0 %


(11.5-14.5) 14.1 %


(11.5-14.5)


 


Platelet Count


 82 x10^3/uL


(140-400) 89 x10^3/uL


(140-400)


 


Neutrophils (%) (Auto) 88 % (31-73)  75 % (31-73) 


 


Lymphocytes (%) (Auto) 3 % (24-48)  5 % (24-48) 


 


Monocytes (%) (Auto) 9 % (0-9)  19 % (0-9) 


 


Eosinophils (%) (Auto) 0 % (0-3)  0 % (0-3) 


 


Basophils (%) (Auto) 0 % (0-3)  0 % (0-3) 


 


Neutrophils # (Auto)


 6.7 x10^3/uL


(1.8-7.7) 5.6 x10^3/uL


(1.8-7.7)


 


Lymphocytes # (Auto)


 0.2 x10^3/uL


(1.0-4.8) 0.4 x10^3/uL


(1.0-4.8)


 


Monocytes # (Auto)


 0.7 x10^3/uL


(0.0-1.1) 1.4 x10^3/uL


(0.0-1.1)


 


Eosinophils # (Auto)


 0.0 x10^3/uL


(0.0-0.7) 0.0 x10^3/uL


(0.0-0.7)


 


Basophils # (Auto)


 0.0 x10^3/uL


(0.0-0.2) 0.0 x10^3/uL


(0.0-0.2)


 


Sodium Level


 140 mmol/L


(136-145) 142 mmol/L


(136-145)


 


Potassium Level


 3.1 mmol/L


(3.5-5.1) 3.4 mmol/L


(3.5-5.1)


 


Chloride Level


 101 mmol/L


() 106 mmol/L


()


 


Carbon Dioxide Level


 32 mmol/L


(21-32) 28 mmol/L


(21-32)


 


Anion Gap 7 (6-14)  8 (6-14) 


 


Blood Urea Nitrogen


 12 mg/dL


(8-26) 12 mg/dL


(8-26)


 


Creatinine


 0.5 mg/dL


(0.7-1.3) 0.5 mg/dL


(0.7-1.3)


 


Estimated GFR


(Cockcroft-Gault) 207.4 


 207.4 





 


BUN/Creatinine Ratio 24 (6-20)  24 (6-20) 


 


Glucose Level


 108 mg/dL


(70-99) 119 mg/dL


(70-99)


 


Calcium Level


 9.8 mg/dL


(8.5-10.1) 9.5 mg/dL


(8.5-10.1)


 


Total Bilirubin


 1.0 mg/dL


(0.2-1.0) 0.5 mg/dL


(0.2-1.0)


 


Aspartate Amino Transf


(AST/SGOT) 81 U/L (15-37) 


 43 U/L (15-37) 





 


Alanine Aminotransferase


(ALT/SGPT) 86 U/L (16-63) 


 61 U/L (16-63) 





 


Alkaline Phosphatase


 60 U/L


() 55 U/L


()


 


Total Protein


 7.5 g/dL


(6.4-8.2) 6.6 g/dL


(6.4-8.2)


 


Albumin


 2.8 g/dL


(3.4-5.0) 2.1 g/dL


(3.4-5.0)


 


Albumin/Globulin Ratio 0.6 (1.0-1.7)  0.5 (1.0-1.7) 








Laboratory Tests








Test


 6/14/21


06:25


 


White Blood Count


 7.4 x10^3/uL


(4.0-11.0)


 


Red Blood Count


 3.91 x10^6/uL


(4.30-5.70)


 


Hemoglobin


 12.7 g/dL


(13.0-17.5)


 


Hematocrit


 37.5 %


(39.0-53.0)


 


Mean Corpuscular Volume 96 fL () 


 


Mean Corpuscular Hemoglobin 33 pg (25-35) 


 


Mean Corpuscular Hemoglobin


Concent 34 g/dL


(31-37)


 


Red Cell Distribution Width


 14.1 %


(11.5-14.5)


 


Platelet Count


 89 x10^3/uL


(140-400)


 


Neutrophils (%) (Auto) 75 % (31-73) 


 


Lymphocytes (%) (Auto) 5 % (24-48) 


 


Monocytes (%) (Auto) 19 % (0-9) 


 


Eosinophils (%) (Auto) 0 % (0-3) 


 


Basophils (%) (Auto) 0 % (0-3) 


 


Neutrophils # (Auto)


 5.6 x10^3/uL


(1.8-7.7)


 


Lymphocytes # (Auto)


 0.4 x10^3/uL


(1.0-4.8)


 


Monocytes # (Auto)


 1.4 x10^3/uL


(0.0-1.1)


 


Eosinophils # (Auto)


 0.0 x10^3/uL


(0.0-0.7)


 


Basophils # (Auto)


 0.0 x10^3/uL


(0.0-0.2)


 


Sodium Level


 142 mmol/L


(136-145)


 


Potassium Level


 3.4 mmol/L


(3.5-5.1)


 


Chloride Level


 106 mmol/L


()


 


Carbon Dioxide Level


 28 mmol/L


(21-32)


 


Anion Gap 8 (6-14) 


 


Blood Urea Nitrogen


 12 mg/dL


(8-26)


 


Creatinine


 0.5 mg/dL


(0.7-1.3)


 


Estimated GFR


(Cockcroft-Gault) 207.4 





 


BUN/Creatinine Ratio 24 (6-20) 


 


Glucose Level


 119 mg/dL


(70-99)


 


Calcium Level


 9.5 mg/dL


(8.5-10.1)


 


Total Bilirubin


 0.5 mg/dL


(0.2-1.0)


 


Aspartate Amino Transf


(AST/SGOT) 43 U/L (15-37) 





 


Alanine Aminotransferase


(ALT/SGPT) 61 U/L (16-63) 





 


Alkaline Phosphatase


 55 U/L


()


 


Total Protein


 6.6 g/dL


(6.4-8.2)


 


Albumin


 2.1 g/dL


(3.4-5.0)


 


Albumin/Globulin Ratio 0.5 (1.0-1.7) 








Problem List


Problems


Medical Problems:


(1) ACE inhibitor-aggravated angioedema


Status: Acute  








Assessment/Plan


s/p trach


would maintain trach at this time.





Justicifation of Admission Dx:


Justifications for Admission:


Justification of Admission Dx:  Yes











CINTHYA KNOTT MD             Jun 14, 2021 19:10

## 2021-06-14 NOTE — NUR
SS following up with discharge planning. SS reviewed pt chart and discussed with pt RN. Pt 
is currently on trach collar. Pt on IV Zosyn and IV Doxycycline. Pt NPO and on PPN. Pt on 
Precedex and having some agitation. [facility] protocol. Dr. Roca following. SS will continue to 
follow for discharge planning.

## 2021-06-15 VITALS — DIASTOLIC BLOOD PRESSURE: 112 MMHG | SYSTOLIC BLOOD PRESSURE: 188 MMHG

## 2021-06-15 VITALS — SYSTOLIC BLOOD PRESSURE: 156 MMHG | DIASTOLIC BLOOD PRESSURE: 94 MMHG

## 2021-06-15 VITALS — DIASTOLIC BLOOD PRESSURE: 97 MMHG | SYSTOLIC BLOOD PRESSURE: 165 MMHG

## 2021-06-15 VITALS — SYSTOLIC BLOOD PRESSURE: 156 MMHG | DIASTOLIC BLOOD PRESSURE: 99 MMHG

## 2021-06-15 VITALS — SYSTOLIC BLOOD PRESSURE: 121 MMHG | DIASTOLIC BLOOD PRESSURE: 79 MMHG

## 2021-06-15 VITALS — DIASTOLIC BLOOD PRESSURE: 89 MMHG | SYSTOLIC BLOOD PRESSURE: 140 MMHG

## 2021-06-15 VITALS — DIASTOLIC BLOOD PRESSURE: 94 MMHG | SYSTOLIC BLOOD PRESSURE: 149 MMHG

## 2021-06-15 VITALS — SYSTOLIC BLOOD PRESSURE: 147 MMHG | DIASTOLIC BLOOD PRESSURE: 99 MMHG

## 2021-06-15 VITALS — SYSTOLIC BLOOD PRESSURE: 167 MMHG | DIASTOLIC BLOOD PRESSURE: 115 MMHG

## 2021-06-15 VITALS — SYSTOLIC BLOOD PRESSURE: 181 MMHG | DIASTOLIC BLOOD PRESSURE: 108 MMHG

## 2021-06-15 VITALS — SYSTOLIC BLOOD PRESSURE: 159 MMHG | DIASTOLIC BLOOD PRESSURE: 98 MMHG

## 2021-06-15 VITALS — SYSTOLIC BLOOD PRESSURE: 184 MMHG | DIASTOLIC BLOOD PRESSURE: 108 MMHG

## 2021-06-15 VITALS — DIASTOLIC BLOOD PRESSURE: 109 MMHG | SYSTOLIC BLOOD PRESSURE: 176 MMHG

## 2021-06-15 VITALS — DIASTOLIC BLOOD PRESSURE: 103 MMHG | SYSTOLIC BLOOD PRESSURE: 159 MMHG

## 2021-06-15 VITALS — SYSTOLIC BLOOD PRESSURE: 143 MMHG | DIASTOLIC BLOOD PRESSURE: 95 MMHG

## 2021-06-15 VITALS — SYSTOLIC BLOOD PRESSURE: 164 MMHG | DIASTOLIC BLOOD PRESSURE: 99 MMHG

## 2021-06-15 VITALS — SYSTOLIC BLOOD PRESSURE: 174 MMHG | DIASTOLIC BLOOD PRESSURE: 112 MMHG

## 2021-06-15 VITALS — DIASTOLIC BLOOD PRESSURE: 86 MMHG | SYSTOLIC BLOOD PRESSURE: 138 MMHG

## 2021-06-15 VITALS — SYSTOLIC BLOOD PRESSURE: 137 MMHG | DIASTOLIC BLOOD PRESSURE: 86 MMHG

## 2021-06-15 VITALS — SYSTOLIC BLOOD PRESSURE: 169 MMHG | DIASTOLIC BLOOD PRESSURE: 114 MMHG

## 2021-06-15 VITALS — SYSTOLIC BLOOD PRESSURE: 111 MMHG | DIASTOLIC BLOOD PRESSURE: 74 MMHG

## 2021-06-15 VITALS — SYSTOLIC BLOOD PRESSURE: 163 MMHG | DIASTOLIC BLOOD PRESSURE: 103 MMHG

## 2021-06-15 VITALS — DIASTOLIC BLOOD PRESSURE: 98 MMHG | SYSTOLIC BLOOD PRESSURE: 164 MMHG

## 2021-06-15 LAB
ANION GAP SERPL CALC-SCNC: 7 MMOL/L (ref 6–14)
BASOPHILS # BLD AUTO: 0 X10^3/UL (ref 0–0.2)
BASOPHILS NFR BLD: 0 % (ref 0–3)
BUN SERPL-MCNC: 12 MG/DL (ref 8–26)
CALCIUM SERPL-MCNC: 9.3 MG/DL (ref 8.5–10.1)
CHLORIDE SERPL-SCNC: 108 MMOL/L (ref 98–107)
CO2 SERPL-SCNC: 29 MMOL/L (ref 21–32)
CREAT SERPL-MCNC: 0.5 MG/DL (ref 0.7–1.3)
EOSINOPHIL NFR BLD: 0 % (ref 0–3)
EOSINOPHIL NFR BLD: 0 X10^3/UL (ref 0–0.7)
ERYTHROCYTE [DISTWIDTH] IN BLOOD BY AUTOMATED COUNT: 14.5 % (ref 11.5–14.5)
GFR SERPLBLD BASED ON 1.73 SQ M-ARVRAT: 207.4 ML/MIN
GLUCOSE SERPL-MCNC: 104 MG/DL (ref 70–99)
HCT VFR BLD CALC: 37.4 % (ref 39–53)
HGB BLD-MCNC: 12.5 G/DL (ref 13–17.5)
LYMPHOCYTES # BLD: 0.7 X10^3/UL (ref 1–4.8)
LYMPHOCYTES NFR BLD AUTO: 11 % (ref 24–48)
MAGNESIUM SERPL-MCNC: 2 MG/DL (ref 1.8–2.4)
MCH RBC QN AUTO: 32 PG (ref 25–35)
MCHC RBC AUTO-ENTMCNC: 34 G/DL (ref 31–37)
MCV RBC AUTO: 97 FL (ref 79–100)
MONO #: 1.6 X10^3/UL (ref 0–1.1)
MONOCYTES NFR BLD: 26 % (ref 0–9)
NEUT #: 3.8 X10^3/UL (ref 1.8–7.7)
NEUTROPHILS NFR BLD AUTO: 63 % (ref 31–73)
PHOSPHATE SERPL-MCNC: 4 MG/DL (ref 2.6–4.7)
PLATELET # BLD AUTO: 119 X10^3/UL (ref 140–400)
POTASSIUM SERPL-SCNC: 3.6 MMOL/L (ref 3.5–5.1)
RBC # BLD AUTO: 3.86 X10^6/UL (ref 4.3–5.7)
SODIUM SERPL-SCNC: 144 MMOL/L (ref 136–145)
WBC # BLD AUTO: 6.1 X10^3/UL (ref 4–11)

## 2021-06-15 RX ADMIN — DEXMEDETOMIDINE HYDROCHLORIDE PRN MLS/HR: 100 INJECTION, SOLUTION, CONCENTRATE INTRAVENOUS at 12:22

## 2021-06-15 RX ADMIN — OLANZAPINE SCH MG: 10 INJECTION, POWDER, FOR SOLUTION INTRAMUSCULAR at 09:00

## 2021-06-15 RX ADMIN — IPRATROPIUM BROMIDE AND ALBUTEROL SULFATE SCH ML: .5; 3 SOLUTION RESPIRATORY (INHALATION) at 16:08

## 2021-06-15 RX ADMIN — PIPERACILLIN SODIUM AND TAZOBACTAM SODIUM SCH MLS/HR: 3; .375 INJECTION, POWDER, LYOPHILIZED, FOR SOLUTION INTRAVENOUS at 23:35

## 2021-06-15 RX ADMIN — FAMOTIDINE SCH MG: 10 INJECTION, SOLUTION INTRAVENOUS at 20:36

## 2021-06-15 RX ADMIN — FAMOTIDINE SCH MG: 10 INJECTION, SOLUTION INTRAVENOUS at 09:38

## 2021-06-15 RX ADMIN — FOLIC ACID SCH MLS/HR: 5 INJECTION, SOLUTION INTRAMUSCULAR; INTRAVENOUS; SUBCUTANEOUS at 09:38

## 2021-06-15 RX ADMIN — PIPERACILLIN SODIUM AND TAZOBACTAM SODIUM SCH MLS/HR: 3; .375 INJECTION, POWDER, LYOPHILIZED, FOR SOLUTION INTRAVENOUS at 05:31

## 2021-06-15 RX ADMIN — HYDRALAZINE HYDROCHLORIDE PRN MG: 20 INJECTION INTRAMUSCULAR; INTRAVENOUS at 12:26

## 2021-06-15 RX ADMIN — METHYLPREDNISOLONE SODIUM SUCCINATE SCH MG: 40 INJECTION, POWDER, FOR SOLUTION INTRAMUSCULAR; INTRAVENOUS at 09:39

## 2021-06-15 RX ADMIN — GLYCERIN, ISOLEUCINE, LEUCINE, LYSINE, METHIONINE, PHENYLALANINE, THREONINE, TRYPTOPHAN, VALINE, ALANINE, GLYCINE, ARGININE, HISTIDINE, PROLINE, SERINE, CYSTEINE, SODIUM ACETATE, MAGNESIUM ACETATE, CALCIUM ACETATE, SODIUM CHLORIDE, POTASSIUM CHLORIDE, PHOSPHORIC ACID, AND POTASSIUM METABISULFITE SCH MLS/HR
3; .21; .27; .22; .16; .17; .12; .046; .2; .21; .42; .29; .085; .34; .18; .014; .2; .054; .026; .12; .15; .041 INJECTION INTRAVENOUS at 09:37

## 2021-06-15 RX ADMIN — HYDRALAZINE HYDROCHLORIDE PRN MG: 20 INJECTION INTRAMUSCULAR; INTRAVENOUS at 20:36

## 2021-06-15 RX ADMIN — DEXMEDETOMIDINE HYDROCHLORIDE PRN MLS/HR: 100 INJECTION, SOLUTION, CONCENTRATE INTRAVENOUS at 18:38

## 2021-06-15 RX ADMIN — DEXMEDETOMIDINE HYDROCHLORIDE PRN MLS/HR: 100 INJECTION, SOLUTION, CONCENTRATE INTRAVENOUS at 03:54

## 2021-06-15 RX ADMIN — IPRATROPIUM BROMIDE AND ALBUTEROL SULFATE SCH ML: .5; 3 SOLUTION RESPIRATORY (INHALATION) at 20:15

## 2021-06-15 RX ADMIN — PIPERACILLIN SODIUM AND TAZOBACTAM SODIUM SCH MLS/HR: 3; .375 INJECTION, POWDER, LYOPHILIZED, FOR SOLUTION INTRAVENOUS at 18:38

## 2021-06-15 RX ADMIN — HYDRALAZINE HYDROCHLORIDE PRN MG: 20 INJECTION INTRAMUSCULAR; INTRAVENOUS at 02:09

## 2021-06-15 RX ADMIN — ENOXAPARIN SODIUM SCH MG: 40 INJECTION SUBCUTANEOUS at 20:35

## 2021-06-15 RX ADMIN — OLANZAPINE SCH MG: 10 INJECTION, POWDER, FOR SOLUTION INTRAMUSCULAR at 11:27

## 2021-06-15 RX ADMIN — IPRATROPIUM BROMIDE AND ALBUTEROL SULFATE SCH ML: .5; 3 SOLUTION RESPIRATORY (INHALATION) at 12:08

## 2021-06-15 RX ADMIN — IPRATROPIUM BROMIDE AND ALBUTEROL SULFATE SCH ML: .5; 3 SOLUTION RESPIRATORY (INHALATION) at 08:05

## 2021-06-15 RX ADMIN — GLYCERIN, ISOLEUCINE, LEUCINE, LYSINE, METHIONINE, PHENYLALANINE, THREONINE, TRYPTOPHAN, VALINE, ALANINE, GLYCINE, ARGININE, HISTIDINE, PROLINE, SERINE, CYSTEINE, SODIUM ACETATE, MAGNESIUM ACETATE, CALCIUM ACETATE, SODIUM CHLORIDE, POTASSIUM CHLORIDE, PHOSPHORIC ACID, AND POTASSIUM METABISULFITE SCH MLS/HR
3; .21; .27; .22; .16; .17; .12; .046; .2; .21; .42; .29; .085; .34; .18; .014; .2; .054; .026; .12; .15; .041 INJECTION INTRAVENOUS at 22:19

## 2021-06-15 RX ADMIN — PIPERACILLIN SODIUM AND TAZOBACTAM SODIUM SCH MLS/HR: 3; .375 INJECTION, POWDER, LYOPHILIZED, FOR SOLUTION INTRAVENOUS at 12:22

## 2021-06-15 NOTE — PDOC
SURGICAL PROGRESS NOTE


DATE: 6/15/21 


TIME: 09:31


Subjective


Pt sedated on trach shield


Vital Signs





Vital Signs








  Date Time  Temp Pulse Resp B/P (MAP) Pulse Ox O2 Delivery O2 Flow Rate FiO2


 


6/15/21 08:07     100 Tracheal Collar 8.0 


 


6/15/21 06:00  63 25 159/98 (118)    


 


6/15/21 04:00 96.3       





 96.3       








I&O











Intake and Output 


 


 6/15/21





 07:00


 


Intake Total 3653 ml


 


Output Total 3150 ml


 


Balance 503 ml


 


 


 


IV Total 3653 ml


 


Output Urine Total 3150 ml








General:  No acute distress


HEENT:  Other (trach intact)


Labs





Laboratory Tests








Test


 6/14/21


06:25 6/15/21


06:45


 


White Blood Count


 7.4 x10^3/uL


(4.0-11.0) 6.1 x10^3/uL


(4.0-11.0)


 


Red Blood Count


 3.91 x10^6/uL


(4.30-5.70) 3.86 x10^6/uL


(4.30-5.70)


 


Hemoglobin


 12.7 g/dL


(13.0-17.5) 12.5 g/dL


(13.0-17.5)


 


Hematocrit


 37.5 %


(39.0-53.0) 37.4 %


(39.0-53.0)


 


Mean Corpuscular Volume 96 fL ()  97 fL () 


 


Mean Corpuscular Hemoglobin 33 pg (25-35)  32 pg (25-35) 


 


Mean Corpuscular Hemoglobin


Concent 34 g/dL


(31-37) 34 g/dL


(31-37)


 


Red Cell Distribution Width


 14.1 %


(11.5-14.5) 14.5 %


(11.5-14.5)


 


Platelet Count


 89 x10^3/uL


(140-400) 119 x10^3/uL


(140-400)


 


Neutrophils (%) (Auto) 75 % (31-73)  63 % (31-73) 


 


Lymphocytes (%) (Auto) 5 % (24-48)  11 % (24-48) 


 


Monocytes (%) (Auto) 19 % (0-9)  26 % (0-9) 


 


Eosinophils (%) (Auto) 0 % (0-3)  0 % (0-3) 


 


Basophils (%) (Auto) 0 % (0-3)  0 % (0-3) 


 


Neutrophils # (Auto)


 5.6 x10^3/uL


(1.8-7.7) 3.8 x10^3/uL


(1.8-7.7)


 


Lymphocytes # (Auto)


 0.4 x10^3/uL


(1.0-4.8) 0.7 x10^3/uL


(1.0-4.8)


 


Monocytes # (Auto)


 1.4 x10^3/uL


(0.0-1.1) 1.6 x10^3/uL


(0.0-1.1)


 


Eosinophils # (Auto)


 0.0 x10^3/uL


(0.0-0.7) 0.0 x10^3/uL


(0.0-0.7)


 


Basophils # (Auto)


 0.0 x10^3/uL


(0.0-0.2) 0.0 x10^3/uL


(0.0-0.2)


 


Sodium Level


 142 mmol/L


(136-145) 144 mmol/L


(136-145)


 


Potassium Level


 3.4 mmol/L


(3.5-5.1) 3.6 mmol/L


(3.5-5.1)


 


Chloride Level


 106 mmol/L


() 108 mmol/L


()


 


Carbon Dioxide Level


 28 mmol/L


(21-32) 29 mmol/L


(21-32)


 


Anion Gap 8 (6-14)  7 (6-14) 


 


Blood Urea Nitrogen


 12 mg/dL


(8-26) 12 mg/dL


(8-26)


 


Creatinine


 0.5 mg/dL


(0.7-1.3) 0.5 mg/dL


(0.7-1.3)


 


Estimated GFR


(Cockcroft-Gault) 207.4 


 207.4 





 


BUN/Creatinine Ratio 24 (6-20)  


 


Glucose Level


 119 mg/dL


(70-99) 104 mg/dL


(70-99)


 


Calcium Level


 9.5 mg/dL


(8.5-10.1) 9.3 mg/dL


(8.5-10.1)


 


Total Bilirubin


 0.5 mg/dL


(0.2-1.0) 





 


Aspartate Amino Transf


(AST/SGOT) 43 U/L (15-37) 


 





 


Alanine Aminotransferase


(ALT/SGPT) 61 U/L (16-63) 


 





 


Alkaline Phosphatase


 55 U/L


() 





 


Total Protein


 6.6 g/dL


(6.4-8.2) 





 


Albumin


 2.1 g/dL


(3.4-5.0) 





 


Albumin/Globulin Ratio 0.5 (1.0-1.7)  


 


Phosphorus Level


 


 4.0 mg/dL


(2.6-4.7)


 


Magnesium Level


 


 2.0 mg/dL


(1.8-2.4)








Laboratory Tests








Test


 6/15/21


06:45


 


White Blood Count


 6.1 x10^3/uL


(4.0-11.0)


 


Red Blood Count


 3.86 x10^6/uL


(4.30-5.70)


 


Hemoglobin


 12.5 g/dL


(13.0-17.5)


 


Hematocrit


 37.4 %


(39.0-53.0)


 


Mean Corpuscular Volume 97 fL () 


 


Mean Corpuscular Hemoglobin 32 pg (25-35) 


 


Mean Corpuscular Hemoglobin


Concent 34 g/dL


(31-37)


 


Red Cell Distribution Width


 14.5 %


(11.5-14.5)


 


Platelet Count


 119 x10^3/uL


(140-400)


 


Neutrophils (%) (Auto) 63 % (31-73) 


 


Lymphocytes (%) (Auto) 11 % (24-48) 


 


Monocytes (%) (Auto) 26 % (0-9) 


 


Eosinophils (%) (Auto) 0 % (0-3) 


 


Basophils (%) (Auto) 0 % (0-3) 


 


Neutrophils # (Auto)


 3.8 x10^3/uL


(1.8-7.7)


 


Lymphocytes # (Auto)


 0.7 x10^3/uL


(1.0-4.8)


 


Monocytes # (Auto)


 1.6 x10^3/uL


(0.0-1.1)


 


Eosinophils # (Auto)


 0.0 x10^3/uL


(0.0-0.7)


 


Basophils # (Auto)


 0.0 x10^3/uL


(0.0-0.2)


 


Sodium Level


 144 mmol/L


(136-145)


 


Potassium Level


 3.6 mmol/L


(3.5-5.1)


 


Chloride Level


 108 mmol/L


()


 


Carbon Dioxide Level


 29 mmol/L


(21-32)


 


Anion Gap 7 (6-14) 


 


Blood Urea Nitrogen


 12 mg/dL


(8-26)


 


Creatinine


 0.5 mg/dL


(0.7-1.3)


 


Estimated GFR


(Cockcroft-Gault) 207.4 





 


Glucose Level


 104 mg/dL


(70-99)


 


Calcium Level


 9.3 mg/dL


(8.5-10.1)


 


Phosphorus Level


 4.0 mg/dL


(2.6-4.7)


 


Magnesium Level


 2.0 mg/dL


(1.8-2.4)








Problem List


Problems


Medical Problems:


(1) ACE inhibitor-aggravated angioedema


Status: Acute  








Assessment/Plan


s/p trach


would favor maintaining trach until pt able to control airway.





Justicifation of Admission Dx:


Justifications for Admission:


Justification of Admission Dx:  Yes











CINTHYA KNOTT MD             Kirill 15, 2021 09:32

## 2021-06-15 NOTE — NUR
SS following up with discharge planning. SS reviewed pt chart and discussed with pt RN. Pt 
is currently on trach collar. Pt on IV Zosyn and IV Zyvox. Pt NPO and on PPN. Pt on 
Precedex. Dr. Roca following. SS will continue to follow for discharge planning.

## 2021-06-15 NOTE — PDOC
PULMONARY PROGRESS NOTES


DATE: 6/15/21 


TIME: 09:08


Subjective


remains on trach shield at 33%


remains Precedex drip


Vitals





Vital Signs








  Date Time  Temp Pulse Resp B/P (MAP) Pulse Ox O2 Delivery O2 Flow Rate FiO2


 


6/15/21 08:07     100 Tracheal Collar 8.0 


 


6/15/21 06:00  63 25 159/98 (118)    


 


6/15/21 04:00 96.3       





 96.3       








Comments


Tracheostomy,


HEENT:  Other (trach midline )


Lungs:  Other (Coarse breath sounds)


Cardiovascular:  S1


Abdomen:  Soft


Extremities:  No Edema


Labs





Laboratory Tests








Test


 6/14/21


06:25 6/15/21


06:45


 


White Blood Count


 7.4 x10^3/uL


(4.0-11.0) 6.1 x10^3/uL


(4.0-11.0)


 


Red Blood Count


 3.91 x10^6/uL


(4.30-5.70) 3.86 x10^6/uL


(4.30-5.70)


 


Hemoglobin


 12.7 g/dL


(13.0-17.5) 12.5 g/dL


(13.0-17.5)


 


Hematocrit


 37.5 %


(39.0-53.0) 37.4 %


(39.0-53.0)


 


Mean Corpuscular Volume 96 fL ()  97 fL () 


 


Mean Corpuscular Hemoglobin 33 pg (25-35)  32 pg (25-35) 


 


Mean Corpuscular Hemoglobin


Concent 34 g/dL


(31-37) 34 g/dL


(31-37)


 


Red Cell Distribution Width


 14.1 %


(11.5-14.5) 14.5 %


(11.5-14.5)


 


Platelet Count


 89 x10^3/uL


(140-400) 119 x10^3/uL


(140-400)


 


Neutrophils (%) (Auto) 75 % (31-73)  63 % (31-73) 


 


Lymphocytes (%) (Auto) 5 % (24-48)  11 % (24-48) 


 


Monocytes (%) (Auto) 19 % (0-9)  26 % (0-9) 


 


Eosinophils (%) (Auto) 0 % (0-3)  0 % (0-3) 


 


Basophils (%) (Auto) 0 % (0-3)  0 % (0-3) 


 


Neutrophils # (Auto)


 5.6 x10^3/uL


(1.8-7.7) 3.8 x10^3/uL


(1.8-7.7)


 


Lymphocytes # (Auto)


 0.4 x10^3/uL


(1.0-4.8) 0.7 x10^3/uL


(1.0-4.8)


 


Monocytes # (Auto)


 1.4 x10^3/uL


(0.0-1.1) 1.6 x10^3/uL


(0.0-1.1)


 


Eosinophils # (Auto)


 0.0 x10^3/uL


(0.0-0.7) 0.0 x10^3/uL


(0.0-0.7)


 


Basophils # (Auto)


 0.0 x10^3/uL


(0.0-0.2) 0.0 x10^3/uL


(0.0-0.2)


 


Sodium Level


 142 mmol/L


(136-145) 144 mmol/L


(136-145)


 


Potassium Level


 3.4 mmol/L


(3.5-5.1) 3.6 mmol/L


(3.5-5.1)


 


Chloride Level


 106 mmol/L


() 108 mmol/L


()


 


Carbon Dioxide Level


 28 mmol/L


(21-32) 29 mmol/L


(21-32)


 


Anion Gap 8 (6-14)  7 (6-14) 


 


Blood Urea Nitrogen


 12 mg/dL


(8-26) 12 mg/dL


(8-26)


 


Creatinine


 0.5 mg/dL


(0.7-1.3) 0.5 mg/dL


(0.7-1.3)


 


Estimated GFR


(Cockcroft-Gault) 207.4 


 207.4 





 


BUN/Creatinine Ratio 24 (6-20)  


 


Glucose Level


 119 mg/dL


(70-99) 104 mg/dL


(70-99)


 


Calcium Level


 9.5 mg/dL


(8.5-10.1) 9.3 mg/dL


(8.5-10.1)


 


Total Bilirubin


 0.5 mg/dL


(0.2-1.0) 





 


Aspartate Amino Transf


(AST/SGOT) 43 U/L (15-37) 


 





 


Alanine Aminotransferase


(ALT/SGPT) 61 U/L (16-63) 


 





 


Alkaline Phosphatase


 55 U/L


() 





 


Total Protein


 6.6 g/dL


(6.4-8.2) 





 


Albumin


 2.1 g/dL


(3.4-5.0) 





 


Albumin/Globulin Ratio 0.5 (1.0-1.7)  


 


Phosphorus Level


 


 4.0 mg/dL


(2.6-4.7)


 


Magnesium Level


 


 2.0 mg/dL


(1.8-2.4)








Laboratory Tests








Test


 6/15/21


06:45


 


White Blood Count


 6.1 x10^3/uL


(4.0-11.0)


 


Red Blood Count


 3.86 x10^6/uL


(4.30-5.70)


 


Hemoglobin


 12.5 g/dL


(13.0-17.5)


 


Hematocrit


 37.4 %


(39.0-53.0)


 


Mean Corpuscular Volume 97 fL () 


 


Mean Corpuscular Hemoglobin 32 pg (25-35) 


 


Mean Corpuscular Hemoglobin


Concent 34 g/dL


(31-37)


 


Red Cell Distribution Width


 14.5 %


(11.5-14.5)


 


Platelet Count


 119 x10^3/uL


(140-400)


 


Neutrophils (%) (Auto) 63 % (31-73) 


 


Lymphocytes (%) (Auto) 11 % (24-48) 


 


Monocytes (%) (Auto) 26 % (0-9) 


 


Eosinophils (%) (Auto) 0 % (0-3) 


 


Basophils (%) (Auto) 0 % (0-3) 


 


Neutrophils # (Auto)


 3.8 x10^3/uL


(1.8-7.7)


 


Lymphocytes # (Auto)


 0.7 x10^3/uL


(1.0-4.8)


 


Monocytes # (Auto)


 1.6 x10^3/uL


(0.0-1.1)


 


Eosinophils # (Auto)


 0.0 x10^3/uL


(0.0-0.7)


 


Basophils # (Auto)


 0.0 x10^3/uL


(0.0-0.2)


 


Sodium Level


 144 mmol/L


(136-145)


 


Potassium Level


 3.6 mmol/L


(3.5-5.1)


 


Chloride Level


 108 mmol/L


()


 


Carbon Dioxide Level


 29 mmol/L


(21-32)


 


Anion Gap 7 (6-14) 


 


Blood Urea Nitrogen


 12 mg/dL


(8-26)


 


Creatinine


 0.5 mg/dL


(0.7-1.3)


 


Estimated GFR


(Cockcroft-Gault) 207.4 





 


Glucose Level


 104 mg/dL


(70-99)


 


Calcium Level


 9.3 mg/dL


(8.5-10.1)


 


Phosphorus Level


 4.0 mg/dL


(2.6-4.7)


 


Magnesium Level


 2.0 mg/dL


(1.8-2.4)








Medications





Active Scripts








 Medications  Dose


 Route/Sig


 Max Daily Dose Days Date Category


 


 Metoprolol


 Succinate ( Xl )


  (Metoprolol


 Succinate) 25 Mg


 Tab.er.24h  25 Mg


 PO DAILY


   6/8/21 Reported


 


 Hydrochlorothiazide


 Tablet


  (Hydrochlorothiazide)


 12.5 Mg Tablet  12.5 Mg


 PO DAILY


   6/8/21 Reported


 


 Ventolin Hfa


 Inhaler


  (Albuterol


 Sulfate) 18 Gm


 Hfa.aer.ad  2 Puff


 INH PRN Q4HRS PRN


   8/15/19 Reported


 


 Amlodipine


 Besylate 10 Mg


 Tablet  10 Mg


 PO DAILY


  30 9/29/17 Rx








Comments


CT chest 


IMPRESSION:


  


1. New left lower lobe dependent consolidation, which may represent infection or

aspiration.





2. Stable right apical cavitation with intracavitary lesion which could 

represent aspergilloma. Additional right apical spiculated 2.4 cm nodule is 

stable from recent exams, but decreased in size from 2013.





Impression


.


ASSESSMENT:


1.  Acute respiratory failure secondary to angioedema from lisinopril.


2.  ACE inhibitor induced angioedema--improving, now S/P trach 


3.  Hypertension.


4.  Chronic obstructive pulmonary disease.


5.  Abnormal chest x-ray revealing opacity, chronic in the right upper lobe, 


will review previous radiographic studies. If the patient has not had a CT in 


the past 12 months, we will obtain CT chest.





Plan


.


Updated 6/15/2021


Continue supplemental oxygen to keep sats above 92%, T-shield at 33%


Follow CXR/PRN 


PRN suctioning 


Follow surgery recs-- ongoing trach 


CIWA for ETOH withdraw, Continue precedex gtt, PRN ativan, reduce dose as 

possible 


NEBS


Follow ID recs for ABX, last AFB in 2019 and Aspergillosis in 2017


Continue steroids IV 


Physical therapy/Occupational Therapy/ST recs-- NPO,


Continue PPN for nutritional support  


DVT/GI PPX: Lovenox


D/W RN and RT





Updated 6/14/2021


Continue supplemental oxygen to keep sats above 92%, T-shield at 33%


Follow surgery recs-- will need trach for approx. week


CIWA for ETOH withdraw, Continue precedex gtt, PRN ativan 


NEBS


Follow ID recs for ABX, Rocephin, Flagyl, last AFB in 2019 and Aspergillosis in 

2017, may need bronchoscopy


Continue steroids IV 


Physical therapy/Occupational Therapy/ST recs-- NPO,


Continue PPN for nutritional support  


DVT/GI PPX: Lovenox


D/W RN and RT











Updated 6/13/2021


Continue supplemental oxygen to keep sats above 92%, T-shield at 33%


Follow chest x-ray as needed


CT chest reviewed


Follow surgery recs-- will need trach for approx. week


CIWA for ETOH withdraw, Continue precedex gtt 


NEBS


Follow ID recs for ABX, Rocephin, Flagyl, last AFB in 2019 and Aspergillosis in 

2017, may need bronchoscopy


Continue steroids


Hypertension Per PCP


Physical therapy/Occupational Therapy/ST recs-- NPO, cont. PPN 


DVT/GI PPX: Lovenox


D/W RN and RT








PLAN:6/12/21


Continue supplemental oxygen to keep sats above 92%, T-shield during the day, 

Vent support PRN 


Follow CXR/ABG


CT chest reviewed


Follow surgery recs-- will need trach for approx. week


CIWA for ETOH withdraw, Continue precedex gtt 


NEBS


Follow ID recs for ABX, D/W ID that he may need repeat bronchoscopy 


Continue steroids 


ST recs-- NPO, cont. PPN 


DVT/GI PPX 





D/W RN and RT











NADIA ANNE MD              Kirill 15, 2021 09:09

## 2021-06-15 NOTE — PDOC
Infectious Disease Note


Subjective:


Subjective


Pt remains on precedex gtt 


hypothermic overnight , on donald hugger


no fevers


no pressors


d/w RN





Vital Signs:


Vital Signs





Vital Signs








  Date Time  Temp Pulse Resp B/P (MAP) Pulse Ox O2 Delivery O2 Flow Rate FiO2


 


6/15/21 06:00  63 25 159/98 (118) 100 Tracheal Collar 8.0 


 


6/15/21 04:00 96.3       





 96.3       











Physical Exam:


PHYSICAL EXAM


GENERAL:  Pt sedated Appears comfortable


HEENT:  Pupils small.  Normal conjunctivae.  Oral cavity pink.


NECK:  Tracheostomy +


LUNGS:  scattered Rhonchi.  No accessory muscle use.


HEART:  S1 and S2, regular.


ABDOMEN:  Nondistended, soft.  No grimace or guarding to palpation.


GENITOURINARY:  Indwelling Ramirez in place.


EXTREMITIES:  No gross edema or cyanosis. Mitts


CNS drowsy


SKIN:  Warm to touch.  No signs of rash.  Peripheral IVs look okay.





Medications:


Inpatient Meds:


Medications reviewed.





Objective:


Assessment:


1.  Chronic cavitation, right upper lobe lung lesion with LLL infiltrates


    differential would be broad with bacterial,fungal or mycobacterial including

nonmycobacterial vs noninfectious


2.  History of Mycobacterium tuberculosis, treated in 2008.  AFB negative in 


2017 and 2019. HIV negative 2017


3.  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


4.  Leukocytosis in part reactive to steroids.- better


5.  Angioedema from Lisinopril ;


    Acute respiratory failure, status post emergent tracheostomy on 06/09/2021.


6.  Hypertension.


7.  Chronic obstructive pulmonary disease.


8.  Tobaccoism and substance abuse.


9.  Alcohol abuse


10. Thrombocytopenia


11. PCM


12. Anemia





Plan:


Plan of Care





 Cont Zosyn 6/14


DC Doxy


start Zyvox 6/15


F/U cult and labs


steroids per pulmonary


Pulmonary following,would benefit from Bronch 


will add aspergillus serologies


Maintain aspiration precautions 


Discussed with nursing 








Critically ill.  





D/w nursing











STEPHANIE CLAYTON MD           Kirill 15, 2021 07:39

## 2021-06-15 NOTE — NUR
Administered ativan per CIWA due to attempts to punch and raise out of bed during trach 
care, suctioning and bath. Unable to follow commands and unable to redirect. VSS remained 
stable. Will continue to monitor and allow to rest.

## 2021-06-15 NOTE — PDOC
[facility] [facility] PROGRESS NOTE


Date of Service


DOS:


DATE: 6/15/21 


TIME: 09:37





Chief Complaint


Chief Complaint


Angioedema


Systolic CHF


Hypertension





Plan:


Plan for removal trach in 1 week by general surgery


Team empiric IV antibiotics


Continue Solu-Medrol 40 mg IV daily


Withhold all ACE inhibitors indefinitely





FEN - Cardiac diet as tolerated


PPX  - Lovenox


FULL CODE


Dispo -continue ICU care





History of Present Illness


History of Present Illness


Patient is a 55-year-old male past medical history hypertension, who presents to

the ED with complaints of lip swelling that began this morning.  Patient was 

recently discharged from our service and during that time he had echocardiogram 

that showed systolic function is mildly to moderately impaired, with Ejection 

Fraction is 35-40%; there is global hypokinesis of the left ventricle; septal 

motion suggestive of conduction defect.  He was initiated on lisinopril, Toprol-

XL, and amlodipine. Patient reportedly took his prescribed lisinopril today and 

woke up from a nap with noted swelling to his lips and left side of his face.  

He initially denied any history of similar symptoms to the ED attending, but 

when family was present she did note he has a history of similar reaction to his

face and lips about 2-3 years ago.  This reaction responded with medications and

they never did figure out what the cause of this reaction was at that time.  

Denies any family history of angioedema.  Treated with Solu-Medrol and some 

epinephrine in the ER.  Will admit patient for monitoring overnight





6/9/2021: Due to concerns of swelling to tongue and compromised airway, 

anesthesia was consulted to help perform tracheostomy.  Patient currently on 

vent, FiO2 40%, PEEP 5.  Had discussion with wife at bedside, she states that 

history of similar episode occurred roughly 3 years ago and she believes this 

reaction was after taking a blood pressure medication she cannot recall.  We 

will continue supportive care.  Due to some steroid-induced hyperglycemia will 

initiate insulin treatment.  2.  Time 30 minutes spent reviewing charts, 

reviewing labs, reviewing imaging, discussion with wife bedside, discussion with

pulmonology, and discussion with RN.





6/10/2021: Afebrile, no acute overnight.  Remains on vent with FiO2 40%.  Plan 

for vacation sedation tomorrow morning.   Critical care time 30 minutes spent 

reviewing charts, reviewing labs, reviewing imaging, discussion with family, and

discussion with RN.





6/11/2021: No acute events overnight.  On trach collar with FiO2 35%, PEEP 5.  

Afebrile.  Sedation vacation planned today.  Critical care time 30 minutes spent

reviewing charts, reviewing labs, formulating discharge plan, discussion with 

RN.





6/14/2021


No acute events overnight.  Continues to be on trach ventilation.  Tolerating 

well without any complications.  No concerns from nursing.  Plan for possible 

bronchoscopy this week per pulmonology.  Will start on Zosyn per ID.  Patient's 

chart, labs, images were reviewed and discussed with RN





6/15/2021


No acute events overnight.  Patient can to be on trach shield.  Saturating 100% 

on 8 L flow rate.  Currently sedated on Precedex.  No major concerns from 

nursing.  Patient's chart, labs, images were reviewed and discussed with RN





Vitals/I&O


Vitals/I&O:





                                   Vital Signs








  Date Time  Temp Pulse Resp B/P (MAP) Pulse Ox O2 Delivery O2 Flow Rate FiO2


 


6/15/21 08:07     100 Tracheal Collar 8.0 


 


6/15/21 06:00  63 25 159/98 (118)    


 


6/15/21 04:00 96.3       





 96.3       














                                    I & O   


 


 6/14/21 6/14/21 6/15/21





 15:00 23:00 07:00


 


Intake Total 150 ml 1826 ml 1677 ml


 


Output Total 1000 ml 1175 ml 975 ml


 


Balance -850 ml 651 ml 702 ml











Physical Exam


Physical Exam:


GENERAL:  Pt sedated Appears comfortable


HEENT:  Pupils small.  Normal conjunctivae.  Oral cavity pink.


NECK:  Tracheostomy +


LUNGS:  scattered Rhonchi.  No accessory muscle use.


HEART:  S1 and S2, regular.


ABDOMEN:  Nondistended, soft.  No grimace or guarding to palpation.


GENITOURINARY:  Indwelling Ramirez in place.


EXTREMITIES:  No gross edema or cyanosis. Mitts


CNS drowsy


SKIN:  Warm to touch.  No signs of rash.  Peripheral IVs look okay.


General:  No acute distress


Heart:  Regular rate


Lungs:  Other (Coarse breath sounds)


Abdomen:  Normal bowel sounds, Soft


Extremities:  No clubbing, No cyanosis


Skin:  No rashes, No breakdown, Other (Tracheostomy in place)





Labs


Labs:





Laboratory Tests








Test


 6/15/21


06:45


 


White Blood Count


 6.1 x10^3/uL


(4.0-11.0)


 


Red Blood Count


 3.86 x10^6/uL


(4.30-5.70)


 


Hemoglobin


 12.5 g/dL


(13.0-17.5)


 


Hematocrit


 37.4 %


(39.0-53.0)


 


Mean Corpuscular Volume 97 fL () 


 


Mean Corpuscular Hemoglobin 32 pg (25-35) 


 


Mean Corpuscular Hemoglobin


Concent 34 g/dL


(31-37)


 


Red Cell Distribution Width


 14.5 %


(11.5-14.5)


 


Platelet Count


 119 x10^3/uL


(140-400)


 


Neutrophils (%) (Auto) 63 % (31-73) 


 


Lymphocytes (%) (Auto) 11 % (24-48) 


 


Monocytes (%) (Auto) 26 % (0-9) 


 


Eosinophils (%) (Auto) 0 % (0-3) 


 


Basophils (%) (Auto) 0 % (0-3) 


 


Neutrophils # (Auto)


 3.8 x10^3/uL


(1.8-7.7)


 


Lymphocytes # (Auto)


 0.7 x10^3/uL


(1.0-4.8)


 


Monocytes # (Auto)


 1.6 x10^3/uL


(0.0-1.1)


 


Eosinophils # (Auto)


 0.0 x10^3/uL


(0.0-0.7)


 


Basophils # (Auto)


 0.0 x10^3/uL


(0.0-0.2)


 


Sodium Level


 144 mmol/L


(136-145)


 


Potassium Level


 3.6 mmol/L


(3.5-5.1)


 


Chloride Level


 108 mmol/L


()


 


Carbon Dioxide Level


 29 mmol/L


(21-32)


 


Anion Gap 7 (6-14) 


 


Blood Urea Nitrogen


 12 mg/dL


(8-26)


 


Creatinine


 0.5 mg/dL


(0.7-1.3)


 


Estimated GFR


(Cockcroft-Gault) 207.4 





 


Glucose Level


 104 mg/dL


(70-99)


 


Calcium Level


 9.3 mg/dL


(8.5-10.1)


 


Phosphorus Level


 4.0 mg/dL


(2.6-4.7)


 


Magnesium Level


 2.0 mg/dL


(1.8-2.4)











Assessment and Plan


Assessmemt and Plan


Problems


Medical Problems:


(1) ACE inhibitor-aggravated angioedema


Status: Acute  











Comment


Review of Relevant


I have reviewed the following items elizabeth (where applicable) has been applied.


Medications:





Current Medications








 Medications


  (Trade)  Dose


 Ordered  Sig/Yg


 Route


 PRN Reason  Start Time


 Stop Time Status Last Admin


Dose Admin


 


 Piperacillin Sod/


 Tazobactam Sod


 3.375 gm/Sodium


 Chloride  50 ml @ 


 100 mls/hr  Q6HRS


 IV


   6/14/21 12:00


    6/15/21 05:31














Justifications for Admission


Other Justification


Angioedema











SHIRLEY HE MD                  Kirill 15, 2021 09:40

## 2021-06-16 VITALS — SYSTOLIC BLOOD PRESSURE: 169 MMHG | DIASTOLIC BLOOD PRESSURE: 107 MMHG

## 2021-06-16 VITALS — DIASTOLIC BLOOD PRESSURE: 95 MMHG | SYSTOLIC BLOOD PRESSURE: 168 MMHG

## 2021-06-16 VITALS — SYSTOLIC BLOOD PRESSURE: 159 MMHG | DIASTOLIC BLOOD PRESSURE: 86 MMHG

## 2021-06-16 VITALS — SYSTOLIC BLOOD PRESSURE: 169 MMHG | DIASTOLIC BLOOD PRESSURE: 99 MMHG

## 2021-06-16 VITALS — SYSTOLIC BLOOD PRESSURE: 172 MMHG | DIASTOLIC BLOOD PRESSURE: 98 MMHG

## 2021-06-16 VITALS — SYSTOLIC BLOOD PRESSURE: 149 MMHG | DIASTOLIC BLOOD PRESSURE: 94 MMHG

## 2021-06-16 VITALS — SYSTOLIC BLOOD PRESSURE: 170 MMHG | DIASTOLIC BLOOD PRESSURE: 99 MMHG

## 2021-06-16 VITALS — SYSTOLIC BLOOD PRESSURE: 186 MMHG | DIASTOLIC BLOOD PRESSURE: 112 MMHG

## 2021-06-16 VITALS — SYSTOLIC BLOOD PRESSURE: 178 MMHG | DIASTOLIC BLOOD PRESSURE: 103 MMHG

## 2021-06-16 VITALS — SYSTOLIC BLOOD PRESSURE: 168 MMHG | DIASTOLIC BLOOD PRESSURE: 106 MMHG

## 2021-06-16 VITALS — DIASTOLIC BLOOD PRESSURE: 98 MMHG | SYSTOLIC BLOOD PRESSURE: 173 MMHG

## 2021-06-16 VITALS — DIASTOLIC BLOOD PRESSURE: 94 MMHG | SYSTOLIC BLOOD PRESSURE: 174 MMHG

## 2021-06-16 VITALS — DIASTOLIC BLOOD PRESSURE: 100 MMHG | SYSTOLIC BLOOD PRESSURE: 165 MMHG

## 2021-06-16 VITALS — SYSTOLIC BLOOD PRESSURE: 165 MMHG | DIASTOLIC BLOOD PRESSURE: 90 MMHG

## 2021-06-16 VITALS — DIASTOLIC BLOOD PRESSURE: 101 MMHG | SYSTOLIC BLOOD PRESSURE: 151 MMHG

## 2021-06-16 VITALS — DIASTOLIC BLOOD PRESSURE: 77 MMHG | SYSTOLIC BLOOD PRESSURE: 119 MMHG

## 2021-06-16 VITALS — DIASTOLIC BLOOD PRESSURE: 106 MMHG | SYSTOLIC BLOOD PRESSURE: 172 MMHG

## 2021-06-16 VITALS — SYSTOLIC BLOOD PRESSURE: 186 MMHG | DIASTOLIC BLOOD PRESSURE: 104 MMHG

## 2021-06-16 VITALS — SYSTOLIC BLOOD PRESSURE: 178 MMHG | DIASTOLIC BLOOD PRESSURE: 104 MMHG

## 2021-06-16 VITALS — DIASTOLIC BLOOD PRESSURE: 113 MMHG | SYSTOLIC BLOOD PRESSURE: 175 MMHG

## 2021-06-16 VITALS — SYSTOLIC BLOOD PRESSURE: 171 MMHG | DIASTOLIC BLOOD PRESSURE: 96 MMHG

## 2021-06-16 VITALS — SYSTOLIC BLOOD PRESSURE: 159 MMHG | DIASTOLIC BLOOD PRESSURE: 93 MMHG

## 2021-06-16 VITALS — DIASTOLIC BLOOD PRESSURE: 94 MMHG | SYSTOLIC BLOOD PRESSURE: 151 MMHG

## 2021-06-16 VITALS — SYSTOLIC BLOOD PRESSURE: 150 MMHG | DIASTOLIC BLOOD PRESSURE: 92 MMHG

## 2021-06-16 LAB
ANION GAP SERPL CALC-SCNC: 7 MMOL/L (ref 6–14)
BUN SERPL-MCNC: 14 MG/DL (ref 8–26)
CALCIUM SERPL-MCNC: 9.6 MG/DL (ref 8.5–10.1)
CHLORIDE SERPL-SCNC: 105 MMOL/L (ref 98–107)
CO2 SERPL-SCNC: 29 MMOL/L (ref 21–32)
CREAT SERPL-MCNC: 0.5 MG/DL (ref 0.7–1.3)
GFR SERPLBLD BASED ON 1.73 SQ M-ARVRAT: 207.4 ML/MIN
GLUCOSE SERPL-MCNC: 96 MG/DL (ref 70–99)
MAGNESIUM SERPL-MCNC: 1.8 MG/DL (ref 1.8–2.4)
PHOSPHATE SERPL-MCNC: 3.2 MG/DL (ref 2.6–4.7)
POTASSIUM SERPL-SCNC: 3.7 MMOL/L (ref 3.5–5.1)
SODIUM SERPL-SCNC: 141 MMOL/L (ref 136–145)

## 2021-06-16 RX ADMIN — GLYCERIN, ISOLEUCINE, LEUCINE, LYSINE, METHIONINE, PHENYLALANINE, THREONINE, TRYPTOPHAN, VALINE, ALANINE, GLYCINE, ARGININE, HISTIDINE, PROLINE, SERINE, CYSTEINE, SODIUM ACETATE, MAGNESIUM ACETATE, CALCIUM ACETATE, SODIUM CHLORIDE, POTASSIUM CHLORIDE, PHOSPHORIC ACID, AND POTASSIUM METABISULFITE SCH MLS/HR
3; .21; .27; .22; .16; .17; .12; .046; .2; .21; .42; .29; .085; .34; .18; .014; .2; .054; .026; .12; .15; .041 INJECTION INTRAVENOUS at 22:56

## 2021-06-16 RX ADMIN — OLANZAPINE SCH MG: 10 INJECTION, POWDER, FOR SOLUTION INTRAMUSCULAR at 09:11

## 2021-06-16 RX ADMIN — FOLIC ACID SCH MLS/HR: 5 INJECTION, SOLUTION INTRAMUSCULAR; INTRAVENOUS; SUBCUTANEOUS at 09:10

## 2021-06-16 RX ADMIN — FAMOTIDINE SCH MG: 10 INJECTION, SOLUTION INTRAVENOUS at 20:09

## 2021-06-16 RX ADMIN — DEXMEDETOMIDINE HYDROCHLORIDE PRN MLS/HR: 100 INJECTION, SOLUTION, CONCENTRATE INTRAVENOUS at 12:53

## 2021-06-16 RX ADMIN — DEXMEDETOMIDINE HYDROCHLORIDE PRN MLS/HR: 100 INJECTION, SOLUTION, CONCENTRATE INTRAVENOUS at 01:10

## 2021-06-16 RX ADMIN — HYDRALAZINE HYDROCHLORIDE PRN MG: 20 INJECTION INTRAMUSCULAR; INTRAVENOUS at 07:41

## 2021-06-16 RX ADMIN — PIPERACILLIN SODIUM AND TAZOBACTAM SODIUM SCH MLS/HR: 3; .375 INJECTION, POWDER, LYOPHILIZED, FOR SOLUTION INTRAVENOUS at 12:53

## 2021-06-16 RX ADMIN — IPRATROPIUM BROMIDE AND ALBUTEROL SULFATE SCH ML: .5; 3 SOLUTION RESPIRATORY (INHALATION) at 15:01

## 2021-06-16 RX ADMIN — METHYLPREDNISOLONE SODIUM SUCCINATE SCH MG: 40 INJECTION, POWDER, FOR SOLUTION INTRAMUSCULAR; INTRAVENOUS at 09:10

## 2021-06-16 RX ADMIN — HYDRALAZINE HYDROCHLORIDE PRN MG: 20 INJECTION INTRAMUSCULAR; INTRAVENOUS at 01:07

## 2021-06-16 RX ADMIN — IPRATROPIUM BROMIDE AND ALBUTEROL SULFATE SCH ML: .5; 3 SOLUTION RESPIRATORY (INHALATION) at 07:40

## 2021-06-16 RX ADMIN — IPRATROPIUM BROMIDE AND ALBUTEROL SULFATE SCH ML: .5; 3 SOLUTION RESPIRATORY (INHALATION) at 11:09

## 2021-06-16 RX ADMIN — HYDRALAZINE HYDROCHLORIDE PRN MG: 20 INJECTION INTRAMUSCULAR; INTRAVENOUS at 06:03

## 2021-06-16 RX ADMIN — HALOPERIDOL LACTATE PRN MG: 5 INJECTION, SOLUTION INTRAMUSCULAR at 20:09

## 2021-06-16 RX ADMIN — IPRATROPIUM BROMIDE AND ALBUTEROL SULFATE SCH ML: .5; 3 SOLUTION RESPIRATORY (INHALATION) at 20:49

## 2021-06-16 RX ADMIN — PIPERACILLIN SODIUM AND TAZOBACTAM SODIUM SCH MLS/HR: 3; .375 INJECTION, POWDER, LYOPHILIZED, FOR SOLUTION INTRAVENOUS at 23:27

## 2021-06-16 RX ADMIN — ENOXAPARIN SODIUM SCH MG: 40 INJECTION SUBCUTANEOUS at 20:10

## 2021-06-16 RX ADMIN — PIPERACILLIN SODIUM AND TAZOBACTAM SODIUM SCH MLS/HR: 3; .375 INJECTION, POWDER, LYOPHILIZED, FOR SOLUTION INTRAVENOUS at 05:31

## 2021-06-16 RX ADMIN — HYDRALAZINE HYDROCHLORIDE PRN MG: 20 INJECTION INTRAMUSCULAR; INTRAVENOUS at 15:16

## 2021-06-16 RX ADMIN — PIPERACILLIN SODIUM AND TAZOBACTAM SODIUM SCH MLS/HR: 3; .375 INJECTION, POWDER, LYOPHILIZED, FOR SOLUTION INTRAVENOUS at 18:31

## 2021-06-16 RX ADMIN — DEXMEDETOMIDINE HYDROCHLORIDE PRN MLS/HR: 100 INJECTION, SOLUTION, CONCENTRATE INTRAVENOUS at 20:10

## 2021-06-16 RX ADMIN — HYDRALAZINE HYDROCHLORIDE PRN MG: 20 INJECTION INTRAMUSCULAR; INTRAVENOUS at 20:09

## 2021-06-16 RX ADMIN — FAMOTIDINE SCH MG: 10 INJECTION, SOLUTION INTRAVENOUS at 09:10

## 2021-06-16 RX ADMIN — GLYCERIN, ISOLEUCINE, LEUCINE, LYSINE, METHIONINE, PHENYLALANINE, THREONINE, TRYPTOPHAN, VALINE, ALANINE, GLYCINE, ARGININE, HISTIDINE, PROLINE, SERINE, CYSTEINE, SODIUM ACETATE, MAGNESIUM ACETATE, CALCIUM ACETATE, SODIUM CHLORIDE, POTASSIUM CHLORIDE, PHOSPHORIC ACID, AND POTASSIUM METABISULFITE SCH MLS/HR
3; .21; .27; .22; .16; .17; .12; .046; .2; .21; .42; .29; .085; .34; .18; .014; .2; .054; .026; .12; .15; .041 INJECTION INTRAVENOUS at 11:01

## 2021-06-16 NOTE — PDOC
Infectious Disease Note


Subjective:


Subjective


Pt remains on precedex gtt 


cont to have thick resp secretions


off donald hugger since yesterday 


no fevers


no pressors


d/w RN





Physical Exam:


PHYSICAL EXAM


GENERAL:  Pt sedated Appears comfortable


HEENT:  Pupils small.  Normal conjunctivae.  Oral cavity pink.


NECK:  Tracheostomy + 


LUNGS:  scattered Rhonchi.  No accessory muscle use.


HEART:  S1 and S2, regular.


ABDOMEN:  Nondistended, soft.  No grimace or guarding to palpation.


GENITOURINARY:  Indwelling Ramirez in place.


EXTREMITIES:  No gross edema or cyanosis. Mitts


CNS drowsy


SKIN:  Warm to touch.  No signs of rash.  Peripheral IVs look okay.





Medications:


Inpatient Meds:


Medications reviewed.





Objective:


Assessment:


1.  Chronic cavitation, right upper lobe lung lesion with LLL infiltrates


    differential would be broad with bacterial,fungal or mycobacterial including

nonmycobacterial vs noninfectious


2.  History of Mycobacterium tuberculosis, treated in 2008.  AFB negative in 


2017 and 2019. HIV negative 2017


3.  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


4.  Leukocytosis in part reactive to steroids.- better


5.  Angioedema from Lisinopril ;


    Acute respiratory failure, status post emergent tracheostomy on 06/09/2021.


6.  Hypertension.


7.  Chronic obstructive pulmonary disease.


8.  Tobaccoism and substance abuse.


9.  Alcohol abuse


10. Thrombocytopenia


11. PCM


12. Anemia





Plan:


Plan of Care





 Cont Zosyn 6/14 and Zyvox 6/15


F/U cult and labs


sputum cult pending


aggressive pulm toilet


steroids per pulmonary


Pulmonary following,would benefit from Bronch 


f/u  aspergillus serologies


Maintain aspiration precautions 


Discussed with nursing 








Critically ill.  





D/w nursing











STEPHANIE CLAYTON MD           Jun 16, 2021 08:17

## 2021-06-16 NOTE — PDOC
[facility] PROGRESS NOTE


Date of Service


DOS:


DATE: 6/16/21 


TIME: 10:37





Chief Complaint


Chief Complaint


Angioedema


Systolic CHF


Hypertension





Plan:


Plan for removal trach in 1 week by general surgery


Team empiric IV antibiotics


Continue Solu-Medrol 40 mg IV daily


Withhold all ACE inhibitors indefinitely





FEN - Cardiac diet as tolerated


PPX  - Lovenox


FULL CODE


Dispo -continue ICU care





History of Present Illness


History of Present Illness


Patient is a 55-year-old male past medical history hypertension, who presents to

the ED with complaints of lip swelling that began this morning.  Patient was 

recently discharged from our service and during that time he had echocardiogram 

that showed systolic function is mildly to moderately impaired, with Ejection 

Fraction is 35-40%; there is global hypokinesis of the left ventricle; septal 

motion suggestive of conduction defect.  He was initiated on lisinopril, Toprol-

XL, and amlodipine. Patient reportedly took his prescribed lisinopril today and 

woke up from a nap with noted swelling to his lips and left side of his face.  

He initially denied any history of similar symptoms to the ED attending, but 

when family was present she did note he has a history of similar reaction to his

face and lips about 2-3 years ago.  This reaction responded with medications and

they never did figure out what the cause of this reaction was at that time.  

Denies any family history of angioedema.  Treated with Solu-Medrol and some 

epinephrine in the ER.  Will admit patient for monitoring overnight





6/9/2021: Due to concerns of swelling to tongue and compromised airway, 

anesthesia was consulted to help perform tracheostomy.  Patient currently on 

vent, FiO2 40%, PEEP 5.  Had discussion with wife at bedside, she states that 

history of similar episode occurred roughly 3 years ago and she believes this 

reaction was after taking a blood pressure medication she cannot recall.  We 

will continue supportive care.  Due to some steroid-induced hyperglycemia will 

initiate insulin treatment.  2.  Time 30 minutes spent reviewing charts, 

reviewing labs, reviewing imaging, discussion with wife bedside, discussion with

pulmonology, and discussion with RN.





6/10/2021: Afebrile, no acute overnight.  Remains on vent with FiO2 40%.  Plan 

for vacation sedation tomorrow morning.   Critical care time 30 minutes spent 

reviewing charts, reviewing labs, reviewing imaging, discussion with family, and

discussion with RN.





6/11/2021: No acute events overnight.  On trach collar with FiO2 35%, PEEP 5.  

Afebrile.  Sedation vacation planned today.  Critical care time 30 minutes spent

reviewing charts, reviewing labs, formulating discharge plan, discussion with 

RN.





6/14/2021


No acute events overnight.  Continues to be on trach ventilation.  Tolerating 

well without any complications.  No concerns from nursing.  Plan for possible 

bronchoscopy this week per pulmonology.  Will start on Zosyn per ID.  Patient's 

chart, labs, images were reviewed and discussed with RN





6/15/2021


No acute events overnight.  Patient can to be on trach shield.  Saturating 100% 

on 8 L flow rate.  Currently sedated on Precedex.  No major concerns from 

nursing.  Patient's chart, labs, images were reviewed and discussed with RN





6/16/2021


No acute events overnight.  Patient seen and examined bedside.  Tolerating trach

collar.  Started on Zyvox yesterday.  Continue with both Zyvox and Zosyn per ID.

 Pending aspergillosis serologies and cultures.  Possible trach removal next 

week per surgery.  Possible bronchoscopy will defer this to pulmonology.  

Patient's chart, labs, images were reviewed and discussed with RN





Vitals/I&O


Vitals/I&O:





                                   Vital Signs








  Date Time  Temp Pulse Resp B/P (MAP) Pulse Ox O2 Delivery O2 Flow Rate FiO2


 


6/16/21 09:03  55 20 159/86 (110) 100 Tracheal Collar  


 


6/16/21 07:40       8.0 


 


6/16/21 07:09 98.0       





 98.0       














                                    I & O   


 


 6/15/21 6/15/21 6/16/21





 15:00 23:00 07:00


 


Intake Total  3008 ml 916 ml


 


Output Total 1150 ml 1035 ml 1050 ml


 


Balance -1150 ml 1973 ml -134 ml











Physical Exam


Physical Exam:


GENERAL:  Pt sedated Appears comfortable


HEENT:  Pupils small.  Normal conjunctivae.  Oral cavity pink.


NECK:  Tracheostomy + 


LUNGS:  scattered Rhonchi.  No accessory muscle use.


HEART:  S1 and S2, regular.


ABDOMEN:  Nondistended, soft.  No grimace or guarding to palpation.


GENITOURINARY:  Indwelling Ramirez in place.


EXTREMITIES:  No gross edema or cyanosis. Mitts


CNS drowsy


SKIN:  Warm to touch.  No signs of rash.  Peripheral IVs look okay.


General:  No acute distress


Heart:  Regular rate


Lungs:  Other (Coarse breath sounds)


Abdomen:  Normal bowel sounds, Soft


Extremities:  No clubbing, No cyanosis


Skin:  No rashes, No breakdown, Other (Tracheostomy in place)





Labs


Labs:





Laboratory Tests








Test


 6/16/21


08:50


 


Sodium Level


 141 mmol/L


(136-145)


 


Potassium Level


 3.7 mmol/L


(3.5-5.1)


 


Chloride Level


 105 mmol/L


()


 


Carbon Dioxide Level


 29 mmol/L


(21-32)


 


Anion Gap 7 (6-14) 


 


Blood Urea Nitrogen


 14 mg/dL


(8-26)


 


Creatinine


 0.5 mg/dL


(0.7-1.3)


 


Estimated GFR


(Cockcroft-Gault) 207.4 





 


Glucose Level


 96 mg/dL


(70-99)


 


Calcium Level


 9.6 mg/dL


(8.5-10.1)


 


Phosphorus Level


 3.2 mg/dL


(2.6-4.7)


 


Magnesium Level


 1.8 mg/dL


(1.8-2.4)











Assessment and Plan


Assessmemt and Plan


Problems


Medical Problems:


(1) ACE inhibitor-aggravated angioedema


Status: Acute  











Comment


Review of Relevant


I have reviewed the following items elizabeth (where applicable) has been applied.





Justifications for Admission


Other Justification


Angioedema











SHIRLEY HE MD                  Jun 16, 2021 10:38

## 2021-06-16 NOTE — PDOC
SURGICAL PROGRESS NOTE


DATE: 6/16/21 


TIME: 13:29


Subjective


Pt sedated on trach shield


Vital Signs





Vital Signs








  Date Time  Temp Pulse Resp B/P (MAP) Pulse Ox O2 Delivery O2 Flow Rate FiO2


 


6/16/21 12:22 97.8 53 16 174/94 (120) 100 Tracheal Collar  





 97.8       


 


6/16/21 11:09       8.0 








I&O











Intake and Output 


 


 6/16/21





 06:59


 


Intake Total 3924 ml


 


Output Total 3235 ml


 


Balance 689 ml


 


 


 


IV Total 3824 ml


 


Other 100 ml


 


Output Urine Total 3235 ml








General:  No acute distress


HEENT:  Other (trach intact)


Labs





Laboratory Tests








Test


 6/15/21


06:45 6/16/21


08:50


 


White Blood Count


 6.1 x10^3/uL


(4.0-11.0) 





 


Red Blood Count


 3.86 x10^6/uL


(4.30-5.70) 





 


Hemoglobin


 12.5 g/dL


(13.0-17.5) 





 


Hematocrit


 37.4 %


(39.0-53.0) 





 


Mean Corpuscular Volume 97 fL ()  


 


Mean Corpuscular Hemoglobin 32 pg (25-35)  


 


Mean Corpuscular Hemoglobin


Concent 34 g/dL


(31-37) 





 


Red Cell Distribution Width


 14.5 %


(11.5-14.5) 





 


Platelet Count


 119 x10^3/uL


(140-400) 





 


Neutrophils (%) (Auto) 63 % (31-73)  


 


Lymphocytes (%) (Auto) 11 % (24-48)  


 


Monocytes (%) (Auto) 26 % (0-9)  


 


Eosinophils (%) (Auto) 0 % (0-3)  


 


Basophils (%) (Auto) 0 % (0-3)  


 


Neutrophils # (Auto)


 3.8 x10^3/uL


(1.8-7.7) 





 


Lymphocytes # (Auto)


 0.7 x10^3/uL


(1.0-4.8) 





 


Monocytes # (Auto)


 1.6 x10^3/uL


(0.0-1.1) 





 


Eosinophils # (Auto)


 0.0 x10^3/uL


(0.0-0.7) 





 


Basophils # (Auto)


 0.0 x10^3/uL


(0.0-0.2) 





 


Sodium Level


 144 mmol/L


(136-145) 141 mmol/L


(136-145)


 


Potassium Level


 3.6 mmol/L


(3.5-5.1) 3.7 mmol/L


(3.5-5.1)


 


Chloride Level


 108 mmol/L


() 105 mmol/L


()


 


Carbon Dioxide Level


 29 mmol/L


(21-32) 29 mmol/L


(21-32)


 


Anion Gap 7 (6-14)  7 (6-14) 


 


Blood Urea Nitrogen


 12 mg/dL


(8-26) 14 mg/dL


(8-26)


 


Creatinine


 0.5 mg/dL


(0.7-1.3) 0.5 mg/dL


(0.7-1.3)


 


Estimated GFR


(Cockcroft-Gault) 207.4 


 207.4 





 


Glucose Level


 104 mg/dL


(70-99) 96 mg/dL


(70-99)


 


Calcium Level


 9.3 mg/dL


(8.5-10.1) 9.6 mg/dL


(8.5-10.1)


 


Phosphorus Level


 4.0 mg/dL


(2.6-4.7) 3.2 mg/dL


(2.6-4.7)


 


Magnesium Level


 2.0 mg/dL


(1.8-2.4) 1.8 mg/dL


(1.8-2.4)








Laboratory Tests








Test


 6/16/21


08:50


 


Sodium Level


 141 mmol/L


(136-145)


 


Potassium Level


 3.7 mmol/L


(3.5-5.1)


 


Chloride Level


 105 mmol/L


()


 


Carbon Dioxide Level


 29 mmol/L


(21-32)


 


Anion Gap 7 (6-14) 


 


Blood Urea Nitrogen


 14 mg/dL


(8-26)


 


Creatinine


 0.5 mg/dL


(0.7-1.3)


 


Estimated GFR


(Cockcroft-Gault) 207.4 





 


Glucose Level


 96 mg/dL


(70-99)


 


Calcium Level


 9.6 mg/dL


(8.5-10.1)


 


Phosphorus Level


 3.2 mg/dL


(2.6-4.7)


 


Magnesium Level


 1.8 mg/dL


(1.8-2.4)








Problem List


Problems


Medical Problems:


(1) ACE inhibitor-aggravated angioedema


Status: Acute  








Assessment/Plan


s/p trach


d/w pulm


OK to remove trach when pt off sedation.


Will sign off, but please call for questions.





Justicifation of Admission Dx:


Justifications for Admission:


Justification of Admission Dx:  Yes











CINTHYA KNOTT MD             Jun 16, 2021 13:30

## 2021-06-16 NOTE — PDOC
PULMONARY PROGRESS NOTES


DATE: 6/16/21 


TIME: 09:01


Subjective


remains on trach shield 


thick secretions 


no overnight events


Vitals





Vital Signs








  Date Time  Temp Pulse Resp B/P (MAP) Pulse Ox O2 Delivery O2 Flow Rate FiO2


 


6/16/21 08:23      Trach Collar  


 


6/16/21 08:18  54 16 165/90 (115) 100   


 


6/16/21 07:40       8.0 


 


6/16/21 07:09 98.0       





 98.0       








Comments


Tracheostomy,


HEENT:  Other (trach midline )


Lungs:  Other (Coarse breath sounds)


Cardiovascular:  S1


Abdomen:  Soft


Extremities:  No Edema


Labs





Laboratory Tests








Test


 6/15/21


06:45


 


White Blood Count


 6.1 x10^3/uL


(4.0-11.0)


 


Red Blood Count


 3.86 x10^6/uL


(4.30-5.70)


 


Hemoglobin


 12.5 g/dL


(13.0-17.5)


 


Hematocrit


 37.4 %


(39.0-53.0)


 


Mean Corpuscular Volume 97 fL () 


 


Mean Corpuscular Hemoglobin 32 pg (25-35) 


 


Mean Corpuscular Hemoglobin


Concent 34 g/dL


(31-37)


 


Red Cell Distribution Width


 14.5 %


(11.5-14.5)


 


Platelet Count


 119 x10^3/uL


(140-400)


 


Neutrophils (%) (Auto) 63 % (31-73) 


 


Lymphocytes (%) (Auto) 11 % (24-48) 


 


Monocytes (%) (Auto) 26 % (0-9) 


 


Eosinophils (%) (Auto) 0 % (0-3) 


 


Basophils (%) (Auto) 0 % (0-3) 


 


Neutrophils # (Auto)


 3.8 x10^3/uL


(1.8-7.7)


 


Lymphocytes # (Auto)


 0.7 x10^3/uL


(1.0-4.8)


 


Monocytes # (Auto)


 1.6 x10^3/uL


(0.0-1.1)


 


Eosinophils # (Auto)


 0.0 x10^3/uL


(0.0-0.7)


 


Basophils # (Auto)


 0.0 x10^3/uL


(0.0-0.2)


 


Sodium Level


 144 mmol/L


(136-145)


 


Potassium Level


 3.6 mmol/L


(3.5-5.1)


 


Chloride Level


 108 mmol/L


()


 


Carbon Dioxide Level


 29 mmol/L


(21-32)


 


Anion Gap 7 (6-14) 


 


Blood Urea Nitrogen


 12 mg/dL


(8-26)


 


Creatinine


 0.5 mg/dL


(0.7-1.3)


 


Estimated GFR


(Cockcroft-Gault) 207.4 





 


Glucose Level


 104 mg/dL


(70-99)


 


Calcium Level


 9.3 mg/dL


(8.5-10.1)


 


Phosphorus Level


 4.0 mg/dL


(2.6-4.7)


 


Magnesium Level


 2.0 mg/dL


(1.8-2.4)








Medications





Active Scripts








 Medications  Dose


 Route/Sig


 Max Daily Dose Days Date Category


 


 Metoprolol


 Succinate ( Xl )


  (Metoprolol


 Succinate) 25 Mg


 Tab.er.24h  25 Mg


 PO DAILY


   6/8/21 Reported


 


 Hydrochlorothiazide


 Tablet


  (Hydrochlorothiazide)


 12.5 Mg Tablet  12.5 Mg


 PO DAILY


   6/8/21 Reported


 


 Ventolin Hfa


 Inhaler


  (Albuterol


 Sulfate) 18 Gm


 Hfa.aer.ad  2 Puff


 INH PRN Q4HRS PRN


   8/15/19 Reported


 


 Amlodipine


 Besylate 10 Mg


 Tablet  10 Mg


 PO DAILY


  30 9/29/17 Rx








Comments


CT chest 


IMPRESSION:


  


1. New left lower lobe dependent consolidation, which may represent infection or

aspiration.





2. Stable right apical cavitation with intracavitary lesion which could rep

resent aspergilloma. Additional right apical spiculated 2.4 cm nodule is stable 

from recent exams, but decreased in size from 2013.





Impression


.


ASSESSMENT:


1.  Acute respiratory failure secondary to angioedema from lisinopril.


2.  ACE inhibitor induced angioedema--improving, now S/P trach 


3.  Hypertension.


4.  Chronic obstructive pulmonary disease.


5.  Abnormal chest x-ray revealing opacity, chronic in the right upper lobe, 


will review previous radiographic studies. If the patient has not had a CT in 


the past 12 months, we will obtain CT chest.





Plan


.


Updated 6/16/2021


Continue supplemental oxygen to keep sats above 92%, 


Follow CXR/PRN 


PRN suctioning 


Plan for bronchoscopy in am 


Follow surgery recs-- ongoing trach 


CIWA for ETOH withdraw,precdex gtt


NEBS


Follow ID recs for ABX, last AFB in 2019 and Aspergillosis in 2017,repeat 

cultures pending 


Continue steroids IV 


Physical therapy/Occupational Therapy


Continue PPN for nutritional support  


DVT/GI PPX: Lovenox


D/W RN and RT








Updated 6/15/2021


Continue supplemental oxygen to keep sats above 92%, T-shield at 33%


Follow CXR/PRN 


PRN suctioning 


Follow surgery recs-- ongoing trach 


CIWA for ETOH withdraw, Continue precedex gtt, PRN ativan, reduce dose as 

possible 


NEBS


Follow ID recs for ABX, last AFB in 2019 and Aspergillosis in 2017


Continue steroids IV 


Physical therapy/Occupational Therapy/ST recs-- NPO,


Continue PPN for nutritional support  


DVT/GI PPX: Lovenox


D/W RN and RT





Updated 6/14/2021


Continue supplemental oxygen to keep sats above 92%, T-shield at 33%


Follow surgery recs-- will need trach for approx. week


CIWA for ETOH withdraw, Continue precedex gtt, PRN ativan 


NEBS


Follow ID recs for ABX, Rocephin, Flagyl, last AFB in 2019 and Aspergillosis in 

2017, may need bronchoscopy


Continue steroids IV 


Physical therapy/Occupational Therapy/ST recs-- NPO,


Continue PPN for nutritional support  


DVT/GI PPX: Lovenox


D/W RN and RT











Updated 6/13/2021


Continue supplemental oxygen to keep sats above 92%, T-shield at 33%


Follow chest x-ray as needed


CT chest reviewed


Follow surgery recs-- will need trach for approx. week


CIWA for ETOH withdraw, Continue precedex gtt 


NEBS


Follow ID recs for ABX, Rocephin, Flagyl, last AFB in 2019 and Aspergillosis in 

2017, may need bronchoscopy


Continue steroids


Hypertension Per PCP


Physical therapy/Occupational Therapy/ST recs-- NPO, cont. PPN 


DVT/GI PPX: Lovenox


D/W RN and RT








PLAN:6/12/21


Continue supplemental oxygen to keep sats above 92%, T-shield during the day, 

Vent support PRN 


Follow CXR/ABG


CT chest reviewed


Follow surgery recs-- will need trach for approx. week


CIWA for ETOH withdraw, Continue precedex gtt 


NEBS


Follow ID recs for ABX, D/W ID that he may need repeat bronchoscopy 


Continue steroids 


ST recs-- NPO, cont. PPN 


DVT/GI PPX 





D/W RN and RT











NADIA ANNE MD              Jun 16, 2021 09:01

## 2021-06-17 VITALS — SYSTOLIC BLOOD PRESSURE: 187 MMHG | DIASTOLIC BLOOD PRESSURE: 111 MMHG

## 2021-06-17 VITALS — DIASTOLIC BLOOD PRESSURE: 106 MMHG | SYSTOLIC BLOOD PRESSURE: 169 MMHG

## 2021-06-17 VITALS — DIASTOLIC BLOOD PRESSURE: 89 MMHG | SYSTOLIC BLOOD PRESSURE: 165 MMHG

## 2021-06-17 VITALS — DIASTOLIC BLOOD PRESSURE: 101 MMHG | SYSTOLIC BLOOD PRESSURE: 173 MMHG

## 2021-06-17 VITALS — SYSTOLIC BLOOD PRESSURE: 159 MMHG | DIASTOLIC BLOOD PRESSURE: 96 MMHG

## 2021-06-17 VITALS — SYSTOLIC BLOOD PRESSURE: 159 MMHG | DIASTOLIC BLOOD PRESSURE: 108 MMHG

## 2021-06-17 VITALS — DIASTOLIC BLOOD PRESSURE: 95 MMHG | SYSTOLIC BLOOD PRESSURE: 142 MMHG

## 2021-06-17 VITALS — DIASTOLIC BLOOD PRESSURE: 97 MMHG | SYSTOLIC BLOOD PRESSURE: 166 MMHG

## 2021-06-17 VITALS — DIASTOLIC BLOOD PRESSURE: 92 MMHG | SYSTOLIC BLOOD PRESSURE: 135 MMHG

## 2021-06-17 VITALS — DIASTOLIC BLOOD PRESSURE: 101 MMHG | SYSTOLIC BLOOD PRESSURE: 162 MMHG

## 2021-06-17 VITALS — DIASTOLIC BLOOD PRESSURE: 94 MMHG | SYSTOLIC BLOOD PRESSURE: 148 MMHG

## 2021-06-17 VITALS — DIASTOLIC BLOOD PRESSURE: 79 MMHG | SYSTOLIC BLOOD PRESSURE: 125 MMHG

## 2021-06-17 VITALS — DIASTOLIC BLOOD PRESSURE: 93 MMHG | SYSTOLIC BLOOD PRESSURE: 165 MMHG

## 2021-06-17 VITALS — SYSTOLIC BLOOD PRESSURE: 160 MMHG | DIASTOLIC BLOOD PRESSURE: 101 MMHG

## 2021-06-17 VITALS — SYSTOLIC BLOOD PRESSURE: 153 MMHG | DIASTOLIC BLOOD PRESSURE: 94 MMHG

## 2021-06-17 VITALS — SYSTOLIC BLOOD PRESSURE: 149 MMHG | DIASTOLIC BLOOD PRESSURE: 103 MMHG

## 2021-06-17 VITALS — DIASTOLIC BLOOD PRESSURE: 90 MMHG | SYSTOLIC BLOOD PRESSURE: 139 MMHG

## 2021-06-17 VITALS — DIASTOLIC BLOOD PRESSURE: 81 MMHG | SYSTOLIC BLOOD PRESSURE: 121 MMHG

## 2021-06-17 VITALS — DIASTOLIC BLOOD PRESSURE: 105 MMHG | SYSTOLIC BLOOD PRESSURE: 165 MMHG

## 2021-06-17 VITALS — SYSTOLIC BLOOD PRESSURE: 158 MMHG | DIASTOLIC BLOOD PRESSURE: 105 MMHG

## 2021-06-17 VITALS — DIASTOLIC BLOOD PRESSURE: 94 MMHG | SYSTOLIC BLOOD PRESSURE: 154 MMHG

## 2021-06-17 VITALS — SYSTOLIC BLOOD PRESSURE: 162 MMHG | DIASTOLIC BLOOD PRESSURE: 95 MMHG

## 2021-06-17 VITALS — SYSTOLIC BLOOD PRESSURE: 142 MMHG | DIASTOLIC BLOOD PRESSURE: 87 MMHG

## 2021-06-17 VITALS — DIASTOLIC BLOOD PRESSURE: 96 MMHG | SYSTOLIC BLOOD PRESSURE: 162 MMHG

## 2021-06-17 VITALS — SYSTOLIC BLOOD PRESSURE: 164 MMHG | DIASTOLIC BLOOD PRESSURE: 96 MMHG

## 2021-06-17 VITALS — DIASTOLIC BLOOD PRESSURE: 99 MMHG | SYSTOLIC BLOOD PRESSURE: 177 MMHG

## 2021-06-17 LAB
ANION GAP SERPL CALC-SCNC: 8 MMOL/L (ref 6–14)
BASOPHILS # BLD AUTO: 0 X10^3/UL (ref 0–0.2)
BASOPHILS NFR BLD: 0 % (ref 0–3)
BUN SERPL-MCNC: 12 MG/DL (ref 8–26)
CALCIUM SERPL-MCNC: 9.6 MG/DL (ref 8.5–10.1)
CHLORIDE SERPL-SCNC: 105 MMOL/L (ref 98–107)
CO2 SERPL-SCNC: 27 MMOL/L (ref 21–32)
CREAT SERPL-MCNC: 0.4 MG/DL (ref 0.7–1.3)
EOSINOPHIL NFR BLD: 0 X10^3/UL (ref 0–0.7)
EOSINOPHIL NFR BLD: 1 % (ref 0–3)
ERYTHROCYTE [DISTWIDTH] IN BLOOD BY AUTOMATED COUNT: 14 % (ref 11.5–14.5)
GFR SERPLBLD BASED ON 1.73 SQ M-ARVRAT: 268.3 ML/MIN
GLUCOSE SERPL-MCNC: 86 MG/DL (ref 70–99)
HCT VFR BLD CALC: 39.1 % (ref 39–53)
HGB BLD-MCNC: 13.2 G/DL (ref 13–17.5)
LYMPHOCYTES # BLD: 0.7 X10^3/UL (ref 1–4.8)
LYMPHOCYTES NFR BLD AUTO: 14 % (ref 24–48)
MAGNESIUM SERPL-MCNC: 2.1 MG/DL (ref 1.8–2.4)
MCH RBC QN AUTO: 32 PG (ref 25–35)
MCHC RBC AUTO-ENTMCNC: 34 G/DL (ref 31–37)
MCV RBC AUTO: 96 FL (ref 79–100)
MONO #: 0.9 X10^3/UL (ref 0–1.1)
MONOCYTES NFR BLD: 19 % (ref 0–9)
NEUT #: 3.2 X10^3/UL (ref 1.8–7.7)
NEUTROPHILS NFR BLD AUTO: 66 % (ref 31–73)
PLATELET # BLD AUTO: 114 X10^3/UL (ref 140–400)
POTASSIUM SERPL-SCNC: 3.7 MMOL/L (ref 3.5–5.1)
RBC # BLD AUTO: 4.08 X10^6/UL (ref 4.3–5.7)
SODIUM SERPL-SCNC: 140 MMOL/L (ref 136–145)
WBC # BLD AUTO: 4.9 X10^3/UL (ref 4–11)

## 2021-06-17 PROCEDURE — 0B9C8ZX DRAINAGE OF RIGHT UPPER LUNG LOBE, VIA NATURAL OR ARTIFICIAL OPENING ENDOSCOPIC, DIAGNOSTIC: ICD-10-PCS | Performed by: INTERNAL MEDICINE

## 2021-06-17 RX ADMIN — IPRATROPIUM BROMIDE AND ALBUTEROL SULFATE SCH ML: .5; 3 SOLUTION RESPIRATORY (INHALATION) at 07:44

## 2021-06-17 RX ADMIN — ENOXAPARIN SODIUM SCH MG: 40 INJECTION SUBCUTANEOUS at 21:00

## 2021-06-17 RX ADMIN — PIPERACILLIN SODIUM AND TAZOBACTAM SODIUM SCH MLS/HR: 3; .375 INJECTION, POWDER, LYOPHILIZED, FOR SOLUTION INTRAVENOUS at 11:08

## 2021-06-17 RX ADMIN — HYDRALAZINE HYDROCHLORIDE PRN MG: 20 INJECTION INTRAMUSCULAR; INTRAVENOUS at 15:06

## 2021-06-17 RX ADMIN — METHYLPREDNISOLONE SODIUM SUCCINATE SCH MG: 40 INJECTION, POWDER, FOR SOLUTION INTRAMUSCULAR; INTRAVENOUS at 08:26

## 2021-06-17 RX ADMIN — GLYCERIN, ISOLEUCINE, LEUCINE, LYSINE, METHIONINE, PHENYLALANINE, THREONINE, TRYPTOPHAN, VALINE, ALANINE, GLYCINE, ARGININE, HISTIDINE, PROLINE, SERINE, CYSTEINE, SODIUM ACETATE, MAGNESIUM ACETATE, CALCIUM ACETATE, SODIUM CHLORIDE, POTASSIUM CHLORIDE, PHOSPHORIC ACID, AND POTASSIUM METABISULFITE SCH MLS/HR
3; .21; .27; .22; .16; .17; .12; .046; .2; .21; .42; .29; .085; .34; .18; .014; .2; .054; .026; .12; .15; .041 INJECTION INTRAVENOUS at 11:07

## 2021-06-17 RX ADMIN — FAMOTIDINE SCH MG: 10 INJECTION, SOLUTION INTRAVENOUS at 20:30

## 2021-06-17 RX ADMIN — DEXMEDETOMIDINE HYDROCHLORIDE PRN MLS/HR: 100 INJECTION, SOLUTION, CONCENTRATE INTRAVENOUS at 22:02

## 2021-06-17 RX ADMIN — FAMOTIDINE SCH MG: 10 INJECTION, SOLUTION INTRAVENOUS at 08:26

## 2021-06-17 RX ADMIN — IPRATROPIUM BROMIDE AND ALBUTEROL SULFATE SCH ML: .5; 3 SOLUTION RESPIRATORY (INHALATION) at 16:06

## 2021-06-17 RX ADMIN — PIPERACILLIN SODIUM AND TAZOBACTAM SODIUM SCH MLS/HR: 3; .375 INJECTION, POWDER, LYOPHILIZED, FOR SOLUTION INTRAVENOUS at 18:22

## 2021-06-17 RX ADMIN — HYDRALAZINE HYDROCHLORIDE PRN MG: 20 INJECTION INTRAMUSCULAR; INTRAVENOUS at 08:26

## 2021-06-17 RX ADMIN — DEXMEDETOMIDINE HYDROCHLORIDE PRN MLS/HR: 100 INJECTION, SOLUTION, CONCENTRATE INTRAVENOUS at 05:18

## 2021-06-17 RX ADMIN — IPRATROPIUM BROMIDE AND ALBUTEROL SULFATE SCH ML: .5; 3 SOLUTION RESPIRATORY (INHALATION) at 11:27

## 2021-06-17 RX ADMIN — PIPERACILLIN SODIUM AND TAZOBACTAM SODIUM SCH MLS/HR: 3; .375 INJECTION, POWDER, LYOPHILIZED, FOR SOLUTION INTRAVENOUS at 05:18

## 2021-06-17 RX ADMIN — OLANZAPINE SCH MG: 10 INJECTION, POWDER, FOR SOLUTION INTRAMUSCULAR at 08:27

## 2021-06-17 RX ADMIN — HYDRALAZINE HYDROCHLORIDE PRN MG: 20 INJECTION INTRAMUSCULAR; INTRAVENOUS at 00:13

## 2021-06-17 RX ADMIN — IPRATROPIUM BROMIDE AND ALBUTEROL SULFATE SCH ML: .5; 3 SOLUTION RESPIRATORY (INHALATION) at 20:20

## 2021-06-17 RX ADMIN — DEXMEDETOMIDINE HYDROCHLORIDE PRN MLS/HR: 100 INJECTION, SOLUTION, CONCENTRATE INTRAVENOUS at 11:09

## 2021-06-17 RX ADMIN — HALOPERIDOL LACTATE PRN MG: 5 INJECTION, SOLUTION INTRAMUSCULAR at 19:36

## 2021-06-17 NOTE — NUR
At 1805 noted seizure like activities which last for about 15-20 seconds. Ativan given. Dr. zapata notified; consult for neuro received. Neuro aware about new consult; no orders 
received from neuro.

## 2021-06-17 NOTE — PDOC
[facility] PROGRESS NOTE


Date of Service


DOS:


DATE: 6/17/21 


TIME: 10:04





Chief Complaint


Chief Complaint


Angioedema


Systolic CHF


Hypertension





Plan:


Plan for removal trach in 1 week by general surgery


Team empiric IV antibiotics


Continue Solu-Medrol 40 mg IV daily


Withhold all ACE inhibitors indefinitely





FEN - Cardiac diet as tolerated


PPX  - Lovenox


FULL CODE


Dispo -continue ICU care





History of Present Illness


History of Present Illness


Patient is a 55-year-old male past medical history hypertension, who presents to

the ED with complaints of lip swelling that began this morning.  Patient was 

recently discharged from our service and during that time he had echocardiogram 

that showed systolic function is mildly to moderately impaired, with Ejection 

Fraction is 35-40%; there is global hypokinesis of the left ventricle; septal 

motion suggestive of conduction defect.  He was initiated on lisinopril, Toprol-

XL, and amlodipine. Patient reportedly took his prescribed lisinopril today and 

woke up from a nap with noted swelling to his lips and left side of his face.  

He initially denied any history of similar symptoms to the ED attending, but 

when family was present she did note he has a history of similar reaction to his

face and lips about 2-3 years ago.  This reaction responded with medications and

they never did figure out what the cause of this reaction was at that time.  

Denies any family history of angioedema.  Treated with Solu-Medrol and some 

epinephrine in the ER.  Will admit patient for monitoring overnight





6/9/2021: Due to concerns of swelling to tongue and compromised airway, 

anesthesia was consulted to help perform tracheostomy.  Patient currently on 

vent, FiO2 40%, PEEP 5.  Had discussion with wife at bedside, she states that 

history of similar episode occurred roughly 3 years ago and she believes this 

reaction was after taking a blood pressure medication she cannot recall.  We 

will continue supportive care.  Due to some steroid-induced hyperglycemia will 

initiate insulin treatment.  2.  Time 30 minutes spent reviewing charts, 

reviewing labs, reviewing imaging, discussion with wife bedside, discussion with

pulmonology, and discussion with RN.





6/10/2021: Afebrile, no acute overnight.  Remains on vent with FiO2 40%.  Plan 

for vacation sedation tomorrow morning.   Critical care time 30 minutes spent 

reviewing charts, reviewing labs, reviewing imaging, discussion with family, and

discussion with RN.





6/11/2021: No acute events overnight.  On trach collar with FiO2 35%, PEEP 5.  

Afebrile.  Sedation vacation planned today.  Critical care time 30 minutes spent

reviewing charts, reviewing labs, formulating discharge plan, discussion with 

RN.





6/14/2021


No acute events overnight.  Continues to be on trach ventilation.  Tolerating 

well without any complications.  No concerns from nursing.  Plan for possible 

bronchoscopy this week per pulmonology.  Will start on Zosyn per ID.  Patient's 

chart, labs, images were reviewed and discussed with RN





6/15/2021


No acute events overnight.  Patient can to be on trach shield.  Saturating 100% 

on 8 L flow rate.  Currently sedated on Precedex.  No major concerns from 

nursing.  Patient's chart, labs, images were reviewed and discussed with RN





6/16/2021


No acute events overnight.  Patient seen and examined bedside.  Tolerating trach

collar.  Started on Zyvox yesterday.  Continue with both Zyvox and Zosyn per ID.

 Pending aspergillosis serologies and cultures.  Possible trach removal next 

week per surgery.  Possible bronchoscopy will defer this to pulmonology.  

Patient's chart, labs, images were reviewed and discussed with RN





6/17/2021


No acute events overnight.  Patient seen and examined bedside.  Pending 

bronchoscopy today.  No concerns from nursing.  Tolerating trach collar at this 

time without any sedation.  Also will removal trach after bronchoscopy is 

completed.  Patient's chart, labs, images were reviewed and discussed with RN





Vitals/I&O


Vitals/I&O:





                                   Vital Signs








  Date Time  Temp Pulse Resp B/P (MAP) Pulse Ox O2 Delivery O2 Flow Rate FiO2


 


6/17/21 08:26  65  160/101    


 


6/17/21 08:00   18  100 Tracheal Collar  


 


6/17/21 07:44       8.0 


 


6/17/21 07:00 97.7       





 97.7       














                                    I & O   


 


 6/16/21 6/16/21 6/17/21





 15:00 23:00 07:00


 


Intake Total 350 ml 2374 ml 1211 ml


 


Output Total 1500 ml 1400 ml 995 ml


 


Balance -1150 ml 974 ml 216 ml











Physical Exam


Physical Exam:


GENERAL:  Pt alert awake mouth's words appears comfortable


HEENT:  Pupils small.  Normal conjunctivae.  Oral cavity pink.


NECK:  Tracheostomy + 


LUNGS:  scattered Rhonchi.  No accessory muscle use.


HEART:  S1 and S2, regular.


ABDOMEN:  Nondistended, soft.  No grimace or guarding to palpation.


GENITOURINARY:  Indwelling Ramirez in place.


EXTREMITIES:  No gross edema or cyanosis. Mitts


CNS alert awake,


SKIN:  Warm to touch.  No signs of rash.  Peripheral IVs look okay.


General:  No acute distress


Heart:  Regular rate


Lungs:  Other (Coarse breath sounds)


Abdomen:  Normal bowel sounds, Soft


Extremities:  No clubbing, No cyanosis


Skin:  No rashes, No breakdown, Other (Tracheostomy in place)





Labs


Labs:





Laboratory Tests








Test


 6/17/21


08:10


 


White Blood Count


 4.9 x10^3/uL


(4.0-11.0)


 


Red Blood Count


 4.08 x10^6/uL


(4.30-5.70)


 


Hemoglobin


 13.2 g/dL


(13.0-17.5)


 


Hematocrit


 39.1 %


(39.0-53.0)


 


Mean Corpuscular Volume 96 fL () 


 


Mean Corpuscular Hemoglobin 32 pg (25-35) 


 


Mean Corpuscular Hemoglobin


Concent 34 g/dL


(31-37)


 


Red Cell Distribution Width


 14.0 %


(11.5-14.5)


 


Platelet Count


 114 x10^3/uL


(140-400)


 


Neutrophils (%) (Auto) 66 % (31-73) 


 


Lymphocytes (%) (Auto) 14 % (24-48) 


 


Monocytes (%) (Auto) 19 % (0-9) 


 


Eosinophils (%) (Auto) 1 % (0-3) 


 


Basophils (%) (Auto) 0 % (0-3) 


 


Neutrophils # (Auto)


 3.2 x10^3/uL


(1.8-7.7)


 


Lymphocytes # (Auto)


 0.7 x10^3/uL


(1.0-4.8)


 


Monocytes # (Auto)


 0.9 x10^3/uL


(0.0-1.1)


 


Eosinophils # (Auto)


 0.0 x10^3/uL


(0.0-0.7)


 


Basophils # (Auto)


 0.0 x10^3/uL


(0.0-0.2)


 


Sodium Level


 140 mmol/L


(136-145)


 


Potassium Level


 3.7 mmol/L


(3.5-5.1)


 


Chloride Level


 105 mmol/L


()


 


Carbon Dioxide Level


 27 mmol/L


(21-32)


 


Anion Gap 8 (6-14) 


 


Blood Urea Nitrogen


 12 mg/dL


(8-26)


 


Creatinine


 0.4 mg/dL


(0.7-1.3)


 


Estimated GFR


(Cockcroft-Gault) 268.3 





 


Glucose Level


 86 mg/dL


(70-99)


 


Calcium Level


 9.6 mg/dL


(8.5-10.1)


 


Magnesium Level


 2.1 mg/dL


(1.8-2.4)











Assessment and Plan


Assessmemt and Plan


Problems


Medical Problems:


(1) ACE inhibitor-aggravated angioedema


Status: Acute  











Comment


Review of Relevant


I have reviewed the following items elizabeth (where applicable) has been applied.


Medications:





Current Medications








 Medications


  (Trade)  Dose


 Ordered  Sig/Yg


 Route


 PRN Reason  Start Time


 Stop Time Status Last Admin


Dose Admin


 


 Lidocaine HCl


  (Lidocaine 2%


 Viscous)  100 ml  PRN 1X  PRN


 MM


 FOR PROCEDURE  6/17/21 08:30


 6/18/21 08:29  6/17/21 10:02





 


 Lidocaine HCl


  (Lidocaine 1%


 20ml Vial)  20 ml  PRN 1X  PRN


 INJ


 SEE COMMENTS  6/17/21 08:30


 6/18/21 08:29  6/17/21 10:03














Justifications for Admission


Other Justification


Angioedema











SHIRLEY HE MD                  Jun 17, 2021 10:09

## 2021-06-17 NOTE — PDOC4
PROCEDURE


Procedure


Full note dictated


Bronchoscopy, no endobronchial lesion, mucous plugging bilaterally.


BAL of the right upper lobe performed


No immediate complications











NADIA ANNE MD              Jun 17, 2021 12:32

## 2021-06-17 NOTE — RAD
XR CHEST 1V



History: Reason: SOA/TRACH / Spl. Instructions:  / History: 



Comparison: June 16, 2021 radiograph. CT June 12, 2021



Findings:

Stable tracheostomy tube. Unchanged right upper lobe consolidation and opacification with fibrotic ch
anges. Ill-defined right midlung opacities, unchanged. Prior granulous disease within the chest. Unch
anged heart size. Chronic left-sided rib fracture.



Impression: 

1.  Stable appearance of the chest compared to prior.



Electronically signed by: Latrell Bocanegra DO (6/17/2021 11:34 AM) [id number]

## 2021-06-17 NOTE — PDOC
Infectious Disease Note


Subjective:


Subjective


Pt more alert today


cont to have thick resp secretions


no fevers





d/w RN





Vital Signs:


Vital Signs





Vital Signs








  Date Time  Temp Pulse Resp B/P (MAP) Pulse Ox O2 Delivery O2 Flow Rate FiO2


 


6/17/21 08:26  65  160/101    


 


6/17/21 08:00   18  100 Tracheal Collar  


 


6/17/21 07:44       8.0 


 


6/17/21 07:00 97.7       





 97.7       











Physical Exam:


PHYSICAL EXAM


GENERAL:  Pt alert awake mouth's words appears comfortable


HEENT:  Pupils small.  Normal conjunctivae.  Oral cavity pink.


NECK:  Tracheostomy + 


LUNGS:  scattered Rhonchi.  No accessory muscle use.


HEART:  S1 and S2, regular.


ABDOMEN:  Nondistended, soft.  No grimace or guarding to palpation.


GENITOURINARY:  Indwelling Ramirez in place.


EXTREMITIES:  No gross edema or cyanosis. Mitts


CNS alert awake,


SKIN:  Warm to touch.  No signs of rash.  Peripheral IVs look okay.





Medications:


Inpatient Meds:


Medications reviewed.





Labs:


Lab





Laboratory Tests








Test


 6/16/21


08:50 6/17/21


08:10


 


Sodium Level


 141 mmol/L


(136-145) 





 


Potassium Level


 3.7 mmol/L


(3.5-5.1) 





 


Chloride Level


 105 mmol/L


() 





 


Carbon Dioxide Level


 29 mmol/L


(21-32) 





 


Anion Gap 7 (6-14)  


 


Blood Urea Nitrogen


 14 mg/dL


(8-26) 





 


Creatinine


 0.5 mg/dL


(0.7-1.3) 





 


Estimated GFR


(Cockcroft-Gault) 207.4 


 





 


Glucose Level


 96 mg/dL


(70-99) 





 


Calcium Level


 9.6 mg/dL


(8.5-10.1) 





 


Phosphorus Level


 3.2 mg/dL


(2.6-4.7) 





 


Magnesium Level


 1.8 mg/dL


(1.8-2.4) 





 


White Blood Count


 


 4.9 x10^3/uL


(4.0-11.0)


 


Red Blood Count


 


 4.08 x10^6/uL


(4.30-5.70)


 


Hemoglobin


 


 13.2 g/dL


(13.0-17.5)


 


Hematocrit


 


 39.1 %


(39.0-53.0)


 


Mean Corpuscular Volume  96 fL () 


 


Mean Corpuscular Hemoglobin  32 pg (25-35) 


 


Mean Corpuscular Hemoglobin


Concent 


 34 g/dL


(31-37)


 


Red Cell Distribution Width


 


 14.0 %


(11.5-14.5)


 


Platelet Count


 


 114 x10^3/uL


(140-400)


 


Neutrophils (%) (Auto)  66 % (31-73) 


 


Lymphocytes (%) (Auto)  14 % (24-48) 


 


Monocytes (%) (Auto)  19 % (0-9) 


 


Eosinophils (%) (Auto)  1 % (0-3) 


 


Basophils (%) (Auto)  0 % (0-3) 


 


Neutrophils # (Auto)


 


 3.2 x10^3/uL


(1.8-7.7)


 


Lymphocytes # (Auto)


 


 0.7 x10^3/uL


(1.0-4.8)


 


Monocytes # (Auto)


 


 0.9 x10^3/uL


(0.0-1.1)


 


Eosinophils # (Auto)


 


 0.0 x10^3/uL


(0.0-0.7)


 


Basophils # (Auto)


 


 0.0 x10^3/uL


(0.0-0.2)








Micro


cxr 6/16








COMPARISON: CT chest dated 6/12/2021





INDICATION:55 years, Male, tracheostomy tube..





FINDINGS: 


Tracheostomy tube tip terminates approximately 7.4 cm proximal to the noah. 

Normal cardiomediastinal silhouette. Unchanged large opacity in the right lung 

apex with bronchiectatic changes and fibrosis. Similar multifocal patchy and 

reticular opacities throughout the right lung. Similar pulmonary emphysema. The 

right apical pulmonary nodule is not appreciated on the current exam. Similar 

left basilar subsegmental atelectasis. No pleural effusion or pneumothorax. No 

acute osseous process.





IMPRESSION:


1. Similar appearance of the large opacity with atelectatic and fibrotic changes

in the right lung.


2. Patchy airspace opacity in the left lung base, may represent atelectasis 

versus infiltrates.


3. Tracheostomy tube tip locates approximately 7.4 cm proximal to the noah.








CT CHEST





FINDINGS:





Lungs and Airways: Stable right apical consolidation with architectural 

distortion, bronchiectasis, calcification, and intracavitary density. Additional

right apical spiculated 2.4 cm nodule is stable from recent exams, but decreased

in size from 2013. Hyperexpanded left lung. Calcified pulmonary granulomas. New 

left lower lobe dependent consolidation. Tracheostomy.





Pleura: The pleural spaces are normal.





Heart and Mediastinum: The visualized thyroid is normal in size and attenuation.

No axillary or supraclavicular lymphadenopathy. No mediastinal, hilar or 

retrocrural lymphadenopathy. Calcified mediastinal lymph nodes consistent with 

remote granulomatous disease The heart and pericardium are within normal limits.

Ectatic ascending thoracic aorta measures 4 cm at the level of the right 

pulmonary artery. 





Abdomen: Hepatic steatosis.





Bones and Soft Tissues: Degenerative changes of the spine.





IMPRESSION:


  


1. New left lower lobe dependent consolidation, which may represent infection or

aspiration.





2. Stable right apical cavitation with intracavitary lesion which could 

represent aspergilloma. Additional right apical spiculated 2.4 cm nodule is 

stable from recent exams, but decreased in size from 2013.





Objective:


Assessment:


1.  Chronic cavitation, right upper lobe lung lesion with LLL infiltrates


    differential would be broad with bacterial,fungal or mycobacterial including

nonmycobacterial vs noninfectious


Sputum cultures NRF


2.  History of Mycobacterium tuberculosis, treated in 2008.  AFB negative in 


2017 and 2019. HIV negative 2017


3.  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


4.  Leukocytosis in part reactive to steroids.- better


5.  Angioedema from Lisinopril ;


    Acute respiratory failure, status post emergent tracheostomy on 06/09/2021.


6.  Hypertension.


7.  Chronic obstructive pulmonary disease.


8.  Tobaccoism and substance abuse.


9.  Alcohol abuse


10. Thrombocytopenia


11. PCM


12. Anemia





Plan:


Plan of Care





 Cont Zosyn 6/14 and Zyvox 6/15


F/U cult and labs


sputum cult NRF


aggressive pulm toilet


steroids per pulmonary


Pulmonary following, awaiting bronchoscopy later today


f/u  aspergillus serologies


Maintain aspiration precautions 


Discussed with nursing 











D/w nursing











STEPHANIE CLAYTON MD           Jun 17, 2021 08:44

## 2021-06-17 NOTE — PDOC
PULMONARY PROGRESS NOTES


DATE: 6/17/21 


TIME: 10:11


Subjective


remains on trach shield 


S/P bronchoscopy 


off sedation, more alert 


no overnight events


Vitals





Vital Signs








  Date Time  Temp Pulse Resp B/P (MAP) Pulse Ox O2 Delivery O2 Flow Rate FiO2


 


6/17/21 10:00  55 18 166/97 (120) 100 Tracheal Collar  


 


6/17/21 07:44       8.0 


 


6/17/21 07:00 97.7       





 97.7       








Comments


Tracheostomy,


HEENT:  Other (trach midline )


Lungs:  Other (Coarse breath sounds)


Cardiovascular:  S1


Abdomen:  Soft


Extremities:  No Edema


Labs





Laboratory Tests








Test


 6/16/21


08:50 6/17/21


08:10


 


Sodium Level


 141 mmol/L


(136-145) 140 mmol/L


(136-145)


 


Potassium Level


 3.7 mmol/L


(3.5-5.1) 3.7 mmol/L


(3.5-5.1)


 


Chloride Level


 105 mmol/L


() 105 mmol/L


()


 


Carbon Dioxide Level


 29 mmol/L


(21-32) 27 mmol/L


(21-32)


 


Anion Gap 7 (6-14)  8 (6-14) 


 


Blood Urea Nitrogen


 14 mg/dL


(8-26) 12 mg/dL


(8-26)


 


Creatinine


 0.5 mg/dL


(0.7-1.3) 0.4 mg/dL


(0.7-1.3)


 


Estimated GFR


(Cockcroft-Gault) 207.4 


 268.3 





 


Glucose Level


 96 mg/dL


(70-99) 86 mg/dL


(70-99)


 


Calcium Level


 9.6 mg/dL


(8.5-10.1) 9.6 mg/dL


(8.5-10.1)


 


Phosphorus Level


 3.2 mg/dL


(2.6-4.7) 





 


Magnesium Level


 1.8 mg/dL


(1.8-2.4) 2.1 mg/dL


(1.8-2.4)


 


White Blood Count


 


 4.9 x10^3/uL


(4.0-11.0)


 


Red Blood Count


 


 4.08 x10^6/uL


(4.30-5.70)


 


Hemoglobin


 


 13.2 g/dL


(13.0-17.5)


 


Hematocrit


 


 39.1 %


(39.0-53.0)


 


Mean Corpuscular Volume  96 fL () 


 


Mean Corpuscular Hemoglobin  32 pg (25-35) 


 


Mean Corpuscular Hemoglobin


Concent 


 34 g/dL


(31-37)


 


Red Cell Distribution Width


 


 14.0 %


(11.5-14.5)


 


Platelet Count


 


 114 x10^3/uL


(140-400)


 


Neutrophils (%) (Auto)  66 % (31-73) 


 


Lymphocytes (%) (Auto)  14 % (24-48) 


 


Monocytes (%) (Auto)  19 % (0-9) 


 


Eosinophils (%) (Auto)  1 % (0-3) 


 


Basophils (%) (Auto)  0 % (0-3) 


 


Neutrophils # (Auto)


 


 3.2 x10^3/uL


(1.8-7.7)


 


Lymphocytes # (Auto)


 


 0.7 x10^3/uL


(1.0-4.8)


 


Monocytes # (Auto)


 


 0.9 x10^3/uL


(0.0-1.1)


 


Eosinophils # (Auto)


 


 0.0 x10^3/uL


(0.0-0.7)


 


Basophils # (Auto)


 


 0.0 x10^3/uL


(0.0-0.2)








Laboratory Tests








Test


 6/17/21


08:10


 


White Blood Count


 4.9 x10^3/uL


(4.0-11.0)


 


Red Blood Count


 4.08 x10^6/uL


(4.30-5.70)


 


Hemoglobin


 13.2 g/dL


(13.0-17.5)


 


Hematocrit


 39.1 %


(39.0-53.0)


 


Mean Corpuscular Volume 96 fL () 


 


Mean Corpuscular Hemoglobin 32 pg (25-35) 


 


Mean Corpuscular Hemoglobin


Concent 34 g/dL


(31-37)


 


Red Cell Distribution Width


 14.0 %


(11.5-14.5)


 


Platelet Count


 114 x10^3/uL


(140-400)


 


Neutrophils (%) (Auto) 66 % (31-73) 


 


Lymphocytes (%) (Auto) 14 % (24-48) 


 


Monocytes (%) (Auto) 19 % (0-9) 


 


Eosinophils (%) (Auto) 1 % (0-3) 


 


Basophils (%) (Auto) 0 % (0-3) 


 


Neutrophils # (Auto)


 3.2 x10^3/uL


(1.8-7.7)


 


Lymphocytes # (Auto)


 0.7 x10^3/uL


(1.0-4.8)


 


Monocytes # (Auto)


 0.9 x10^3/uL


(0.0-1.1)


 


Eosinophils # (Auto)


 0.0 x10^3/uL


(0.0-0.7)


 


Basophils # (Auto)


 0.0 x10^3/uL


(0.0-0.2)


 


Sodium Level


 140 mmol/L


(136-145)


 


Potassium Level


 3.7 mmol/L


(3.5-5.1)


 


Chloride Level


 105 mmol/L


()


 


Carbon Dioxide Level


 27 mmol/L


(21-32)


 


Anion Gap 8 (6-14) 


 


Blood Urea Nitrogen


 12 mg/dL


(8-26)


 


Creatinine


 0.4 mg/dL


(0.7-1.3)


 


Estimated GFR


(Cockcroft-Gault) 268.3 





 


Glucose Level


 86 mg/dL


(70-99)


 


Calcium Level


 9.6 mg/dL


(8.5-10.1)


 


Magnesium Level


 2.1 mg/dL


(1.8-2.4)








Medications





Active Scripts








 Medications  Dose


 Route/Sig


 Max Daily Dose Days Date Category


 


 Metoprolol


 Succinate ( Xl )


  (Metoprolol


 Succinate) 25 Mg


 Tab.er.24h  25 Mg


 PO DAILY


   6/8/21 Reported


 


 Hydrochlorothiazide


 Tablet


  (Hydrochlorothiazide)


 12.5 Mg Tablet  12.5 Mg


 PO DAILY


   6/8/21 Reported


 


 Ventolin Hfa


 Inhaler


  (Albuterol


 Sulfate) 18 Gm


 Hfa.aer.ad  2 Puff


 INH PRN Q4HRS PRN


   8/15/19 Reported


 


 Amlodipine


 Besylate 10 Mg


 Tablet  10 Mg


 PO DAILY


  30 9/29/17 Rx








Comments


CT chest 


IMPRESSION:


  


1. New left lower lobe dependent consolidation, which may represent infection or

aspiration.





2. Stable right apical cavitation with intracavitary lesion which could 

represent aspergilloma. Additional right apical spiculated 2.4 cm nodule is 

stable from recent exams, but decreased in size from 2013.





Impression


.


ASSESSMENT:


1.  Acute respiratory failure secondary to angioedema from lisinopril.


2.  ACE inhibitor induced angioedema--improving, now S/P trach 


3.  Hypertension.


4.  Chronic obstructive pulmonary disease.


5.  Abnormal chest x-ray revealing opacity, chronic in the right upper lobe, 


will review previous radiographic studies. If the patient has not had a CT in 


the past 12 months, we will obtain CT chest.





Plan


.


Updated 6/17/2021


Continue supplemental oxygen to keep sats above 92%, on t-shield 


avoid sedation 


Follow CXR/PRN 


PRN suctioning 


S/P bronchoscopy -- see procedure note 


Follow surgery recs-- ongoing trach, plan for decannulation in next 24-48hours  


NEBS


Follow ID recs for ABX, last AFB in 2019 and Aspergillosis in 2017,repeat 

cultures pending 


Continue steroids IV 


Physical therapy/Occupational Therapy


Continue PPN for nutritional support  


DVT/GI PPX: Lovenox


D/W RN and RT








Updated 6/16/2021


Continue supplemental oxygen to keep sats above 92%, 


Follow CXR/PRN 


PRN suctioning 


Plan for bronchoscopy in am 


Follow surgery recs-- ongoing trach 


CIWA for ETOH withdraw,precdex gtt


NEBS


Follow ID recs for ABX, last AFB in 2019 and Aspergillosis in 2017,repeat 

cultures pending 


Continue steroids IV 


Physical therapy/Occupational Therapy


Continue PPN for nutritional support  


DVT/GI PPX: Lovenox


D/W RN and RT








Updated 6/15/2021


Continue supplemental oxygen to keep sats above 92%, T-shield at 33%


Follow CXR/PRN 


PRN suctioning 


Follow surgery recs-- ongoing trach 


CIWA for ETOH withdraw, Continue precedex gtt, PRN ativan, reduce dose as 

possible 


NEBS


Follow ID recs for ABX, last AFB in 2019 and Aspergillosis in 2017


Continue steroids IV 


Physical therapy/Occupational Therapy/ST recs-- NPO,


Continue PPN for nutritional support  


DVT/GI PPX: Lovenox


D/W RN and RT











NADIA ANNE MD              Jun 17, 2021 10:11

## 2021-06-17 NOTE — OP
DATE OF SURGERY: 06/17/2021

PROCEDURE:   Bronchoscopy.



INDICATION:  The patient with a prior history of Aspergillus presenting with 

abnormal CT chest revealing evidence of a new left lower lobe consolidation 

along with stable right apical cavitary lesion, undergoing diagnostic 

bronchoscopy.  Risks, benefits and alternatives were reviewed with the patient 

and family.  They consented.



DESCRIPTION OF PROCEDURE:  Timeout was performed prior to initiating the 

bronchoscopic evaluation.



DESCRIPTION OF PROCEDURE:  The patient was given a total of 4 mg of Versed in 

incremental dosages.  He had a trach in place from previous angioedema related 

to ACE inhibitor.  He was also sedated with Precedex.  The patient over the last

couple of days was experiencing alcohol withdrawal.



The scope was passed through the indwelling tracheostomy tube, properly 

positioned above the noah.  Upon inserting the scope, there was bilateral 

mucus plugging more on the right than the left.  The mucus plugging was cleared 

away with saline.  I inspected the airways.  There was no endobronchial lesion.



The scope was wedged into the right upper lobe and a bronchoalveolar lavage was 

performed.  The return was clear.



FINDINGS:

1.  Properly positioned tracheostomy tube.

2.  Mucous plugging bilaterally.

3.  No endobronchial lesion.

4.  BAL performed in the right upper lobe.

5.  Await the BAL results, continue current antibiotics per Infectious Disease 

service.







LEONARD

DR: [place]   DD: 06/17/2021 12:31

DT: 06/17/2021 13:42   TID: [id number]

## 2021-06-17 NOTE — NUR
SS following up with discharge planning. SS reviewed pt chart and discussed with pt RN. Pt 
currently on trach collar. Pt on IV Zosyn and IV Zyvox. Pt NPO and on PPN. Pt on Precedex. 
Pt having Bronchoscopy today. Per Dr. Roca, david to remove trach once sedation is off. SS 
will continue to follow for discharge planning.

## 2021-06-18 VITALS — DIASTOLIC BLOOD PRESSURE: 97 MMHG | SYSTOLIC BLOOD PRESSURE: 167 MMHG

## 2021-06-18 VITALS — SYSTOLIC BLOOD PRESSURE: 175 MMHG | DIASTOLIC BLOOD PRESSURE: 111 MMHG

## 2021-06-18 VITALS — DIASTOLIC BLOOD PRESSURE: 97 MMHG | SYSTOLIC BLOOD PRESSURE: 163 MMHG

## 2021-06-18 VITALS — SYSTOLIC BLOOD PRESSURE: 143 MMHG | DIASTOLIC BLOOD PRESSURE: 91 MMHG

## 2021-06-18 VITALS — DIASTOLIC BLOOD PRESSURE: 87 MMHG | SYSTOLIC BLOOD PRESSURE: 140 MMHG

## 2021-06-18 VITALS — DIASTOLIC BLOOD PRESSURE: 102 MMHG | SYSTOLIC BLOOD PRESSURE: 156 MMHG

## 2021-06-18 VITALS — SYSTOLIC BLOOD PRESSURE: 168 MMHG | DIASTOLIC BLOOD PRESSURE: 92 MMHG

## 2021-06-18 VITALS — SYSTOLIC BLOOD PRESSURE: 149 MMHG | DIASTOLIC BLOOD PRESSURE: 103 MMHG

## 2021-06-18 VITALS — SYSTOLIC BLOOD PRESSURE: 164 MMHG | DIASTOLIC BLOOD PRESSURE: 99 MMHG

## 2021-06-18 VITALS — DIASTOLIC BLOOD PRESSURE: 94 MMHG | SYSTOLIC BLOOD PRESSURE: 160 MMHG

## 2021-06-18 VITALS — DIASTOLIC BLOOD PRESSURE: 104 MMHG | SYSTOLIC BLOOD PRESSURE: 161 MMHG

## 2021-06-18 VITALS — DIASTOLIC BLOOD PRESSURE: 100 MMHG | SYSTOLIC BLOOD PRESSURE: 150 MMHG

## 2021-06-18 VITALS — DIASTOLIC BLOOD PRESSURE: 93 MMHG | SYSTOLIC BLOOD PRESSURE: 164 MMHG

## 2021-06-18 VITALS — SYSTOLIC BLOOD PRESSURE: 144 MMHG | DIASTOLIC BLOOD PRESSURE: 99 MMHG

## 2021-06-18 VITALS — SYSTOLIC BLOOD PRESSURE: 152 MMHG | DIASTOLIC BLOOD PRESSURE: 106 MMHG

## 2021-06-18 VITALS — SYSTOLIC BLOOD PRESSURE: 154 MMHG | DIASTOLIC BLOOD PRESSURE: 93 MMHG

## 2021-06-18 VITALS — DIASTOLIC BLOOD PRESSURE: 101 MMHG | SYSTOLIC BLOOD PRESSURE: 171 MMHG

## 2021-06-18 VITALS — SYSTOLIC BLOOD PRESSURE: 133 MMHG | DIASTOLIC BLOOD PRESSURE: 83 MMHG

## 2021-06-18 VITALS — DIASTOLIC BLOOD PRESSURE: 95 MMHG | SYSTOLIC BLOOD PRESSURE: 157 MMHG

## 2021-06-18 VITALS — DIASTOLIC BLOOD PRESSURE: 87 MMHG | SYSTOLIC BLOOD PRESSURE: 141 MMHG

## 2021-06-18 VITALS — SYSTOLIC BLOOD PRESSURE: 150 MMHG | DIASTOLIC BLOOD PRESSURE: 94 MMHG

## 2021-06-18 VITALS — DIASTOLIC BLOOD PRESSURE: 96 MMHG | SYSTOLIC BLOOD PRESSURE: 149 MMHG

## 2021-06-18 VITALS — SYSTOLIC BLOOD PRESSURE: 129 MMHG | DIASTOLIC BLOOD PRESSURE: 88 MMHG

## 2021-06-18 VITALS — DIASTOLIC BLOOD PRESSURE: 90 MMHG | SYSTOLIC BLOOD PRESSURE: 139 MMHG

## 2021-06-18 RX ADMIN — GLYCERIN, ISOLEUCINE, LEUCINE, LYSINE, METHIONINE, PHENYLALANINE, THREONINE, TRYPTOPHAN, VALINE, ALANINE, GLYCINE, ARGININE, HISTIDINE, PROLINE, SERINE, CYSTEINE, SODIUM ACETATE, MAGNESIUM ACETATE, CALCIUM ACETATE, SODIUM CHLORIDE, POTASSIUM CHLORIDE, PHOSPHORIC ACID, AND POTASSIUM METABISULFITE SCH MLS/HR
3; .21; .27; .22; .16; .17; .12; .046; .2; .21; .42; .29; .085; .34; .18; .014; .2; .054; .026; .12; .15; .041 INJECTION INTRAVENOUS at 12:51

## 2021-06-18 RX ADMIN — IPRATROPIUM BROMIDE AND ALBUTEROL SULFATE SCH ML: .5; 3 SOLUTION RESPIRATORY (INHALATION) at 12:00

## 2021-06-18 RX ADMIN — FAMOTIDINE SCH MG: 10 INJECTION, SOLUTION INTRAVENOUS at 20:43

## 2021-06-18 RX ADMIN — HYDRALAZINE HYDROCHLORIDE PRN MG: 20 INJECTION INTRAMUSCULAR; INTRAVENOUS at 08:12

## 2021-06-18 RX ADMIN — PIPERACILLIN SODIUM AND TAZOBACTAM SODIUM SCH MLS/HR: 3; .375 INJECTION, POWDER, LYOPHILIZED, FOR SOLUTION INTRAVENOUS at 00:25

## 2021-06-18 RX ADMIN — PIPERACILLIN SODIUM AND TAZOBACTAM SODIUM SCH MLS/HR: 3; .375 INJECTION, POWDER, LYOPHILIZED, FOR SOLUTION INTRAVENOUS at 05:44

## 2021-06-18 RX ADMIN — ENOXAPARIN SODIUM SCH MG: 40 INJECTION SUBCUTANEOUS at 20:43

## 2021-06-18 RX ADMIN — IPRATROPIUM BROMIDE AND ALBUTEROL SULFATE SCH ML: .5; 3 SOLUTION RESPIRATORY (INHALATION) at 20:18

## 2021-06-18 RX ADMIN — PIPERACILLIN SODIUM AND TAZOBACTAM SODIUM SCH MLS/HR: 3; .375 INJECTION, POWDER, LYOPHILIZED, FOR SOLUTION INTRAVENOUS at 12:35

## 2021-06-18 RX ADMIN — FAMOTIDINE SCH MG: 10 INJECTION, SOLUTION INTRAVENOUS at 08:11

## 2021-06-18 RX ADMIN — IPRATROPIUM BROMIDE AND ALBUTEROL SULFATE SCH ML: .5; 3 SOLUTION RESPIRATORY (INHALATION) at 15:19

## 2021-06-18 RX ADMIN — PIPERACILLIN SODIUM AND TAZOBACTAM SODIUM SCH MLS/HR: 3; .375 INJECTION, POWDER, LYOPHILIZED, FOR SOLUTION INTRAVENOUS at 23:43

## 2021-06-18 RX ADMIN — PIPERACILLIN SODIUM AND TAZOBACTAM SODIUM SCH MLS/HR: 3; .375 INJECTION, POWDER, LYOPHILIZED, FOR SOLUTION INTRAVENOUS at 17:51

## 2021-06-18 RX ADMIN — IPRATROPIUM BROMIDE AND ALBUTEROL SULFATE SCH ML: .5; 3 SOLUTION RESPIRATORY (INHALATION) at 07:39

## 2021-06-18 RX ADMIN — GLYCERIN, ISOLEUCINE, LEUCINE, LYSINE, METHIONINE, PHENYLALANINE, THREONINE, TRYPTOPHAN, VALINE, ALANINE, GLYCINE, ARGININE, HISTIDINE, PROLINE, SERINE, CYSTEINE, SODIUM ACETATE, MAGNESIUM ACETATE, CALCIUM ACETATE, SODIUM CHLORIDE, POTASSIUM CHLORIDE, PHOSPHORIC ACID, AND POTASSIUM METABISULFITE SCH MLS/HR
3; .21; .27; .22; .16; .17; .12; .046; .2; .21; .42; .29; .085; .34; .18; .014; .2; .054; .026; .12; .15; .041 INJECTION INTRAVENOUS at 00:27

## 2021-06-18 RX ADMIN — METHYLPREDNISOLONE SODIUM SUCCINATE SCH MG: 40 INJECTION, POWDER, FOR SOLUTION INTRAMUSCULAR; INTRAVENOUS at 08:12

## 2021-06-18 RX ADMIN — OLANZAPINE SCH MG: 10 INJECTION, POWDER, FOR SOLUTION INTRAMUSCULAR at 09:00

## 2021-06-18 NOTE — PDOC
Infectious Disease Note


Subjective:


Subjective


Pt alert awake


no fevers





d/w RN





Vital Signs:


Vital Signs





Vital Signs








  Date Time  Temp Pulse Resp B/P (MAP) Pulse Ox O2 Delivery O2 Flow Rate FiO2


 


6/18/21 07:40     98 Tracheal Collar 8.0 


 


6/18/21 07:00  103 20 150/100 (117)    


 


6/18/21 04:00 97.8       





 97.8       











Physical Exam:


PHYSICAL EXAM


GENERAL:  Pt alert awake mouth's words appears comfortable


HEENT:  Pupils small.  Normal conjunctivae.  Oral cavity pink.


NECK:  Tracheostomy + 


LUNGS:  scattered Rhonchi.  No accessory muscle use.


HEART:  S1 and S2, regular.


ABDOMEN:  Nondistended, soft.  No grimace or guarding to palpation.


GENITOURINARY:  Indwelling Ramierz in place.


EXTREMITIES:  No gross edema or cyanosis. Mitts


CNS alert awake,


SKIN:  Warm to touch.  No signs of rash.  Peripheral IVs look okay.





Medications:


Inpatient Meds:


Medications reviewed.





Labs:


Lab





Laboratory Tests








Test


 6/17/21


08:10


 


White Blood Count


 4.9 x10^3/uL


(4.0-11.0)


 


Red Blood Count


 4.08 x10^6/uL


(4.30-5.70)


 


Hemoglobin


 13.2 g/dL


(13.0-17.5)


 


Hematocrit


 39.1 %


(39.0-53.0)


 


Mean Corpuscular Volume 96 fL () 


 


Mean Corpuscular Hemoglobin 32 pg (25-35) 


 


Mean Corpuscular Hemoglobin


Concent 34 g/dL


(31-37)


 


Red Cell Distribution Width


 14.0 %


(11.5-14.5)


 


Platelet Count


 114 x10^3/uL


(140-400)


 


Neutrophils (%) (Auto) 66 % (31-73) 


 


Lymphocytes (%) (Auto) 14 % (24-48) 


 


Monocytes (%) (Auto) 19 % (0-9) 


 


Eosinophils (%) (Auto) 1 % (0-3) 


 


Basophils (%) (Auto) 0 % (0-3) 


 


Neutrophils # (Auto)


 3.2 x10^3/uL


(1.8-7.7)


 


Lymphocytes # (Auto)


 0.7 x10^3/uL


(1.0-4.8)


 


Monocytes # (Auto)


 0.9 x10^3/uL


(0.0-1.1)


 


Eosinophils # (Auto)


 0.0 x10^3/uL


(0.0-0.7)


 


Basophils # (Auto)


 0.0 x10^3/uL


(0.0-0.2)


 


Sodium Level


 140 mmol/L


(136-145)


 


Potassium Level


 3.7 mmol/L


(3.5-5.1)


 


Chloride Level


 105 mmol/L


()


 


Carbon Dioxide Level


 27 mmol/L


(21-32)


 


Anion Gap 8 (6-14) 


 


Blood Urea Nitrogen


 12 mg/dL


(8-26)


 


Creatinine


 0.4 mg/dL


(0.7-1.3)


 


Estimated GFR


(Cockcroft-Gault) 268.3 





 


Glucose Level


 86 mg/dL


(70-99)


 


Calcium Level


 9.6 mg/dL


(8.5-10.1)


 


Magnesium Level


 2.1 mg/dL


(1.8-2.4)








Micro


cxr 6/16








COMPARISON: CT chest dated 6/12/2021





INDICATION:55 years, Male, tracheostomy tube..





FINDINGS: 


Tracheostomy tube tip terminates approximately 7.4 cm proximal to the noah. 

Normal cardiomediastinal silhouette. Unchanged large opacity in the right lung 

apex with bronchiectatic changes and fibrosis. Similar multifocal patchy and 

reticular opacities throughout the right lung. Similar pulmonary emphysema. The 

right apical pulmonary nodule is not appreciated on the current exam. Similar 

left basilar subsegmental atelectasis. No pleural effusion or pneumothorax. No 

acute osseous process.





IMPRESSION:


1. Similar appearance of the large opacity with atelectatic and fibrotic changes

in the right lung.


2. Patchy airspace opacity in the left lung base, may represent atelectasis 

versus infiltrates.


3. Tracheostomy tube tip locates approximately 7.4 cm proximal to the noah.








CT CHEST





FINDINGS:





Lungs and Airways: Stable right apical consolidation with architectural 

distortion, bronchiectasis, calcification, and intracavitary density. Additional

right apical spiculated 2.4 cm nodule is stable from recent exams, but decreased

in size from 2013. Hyperexpanded left lung. Calcified pulmonary granulomas. New 

left lower lobe dependent consolidation. Tracheostomy.





Pleura: The pleural spaces are normal.





Heart and Mediastinum: The visualized thyroid is normal in size and attenuation.

No axillary or supraclavicular lymphadenopathy. No mediastinal, hilar or 

retrocrural lymphadenopathy. Calcified mediastinal lymph nodes consistent with 

remote granulomatous disease The heart and pericardium are within normal limits.

Ectatic ascending thoracic aorta measures 4 cm at the level of the right 

pulmonary artery. 





Abdomen: Hepatic steatosis.





Bones and Soft Tissues: Degenerative changes of the spine.





IMPRESSION:


  


1. New left lower lobe dependent consolidation, which may represent infection or

aspiration.





2. Stable right apical cavitation with intracavitary lesion which could 

represent aspergilloma. Additional right apical spiculated 2.4 cm nodule is 

stable from recent exams, but decreased in size from 2013.





Objective:


Assessment:


1.  Chronic cavitation, right upper lobe lung lesion with LLL infiltrates


    differential would be broad with bacterial,fungal or mycobacterial including

nonmycobacterial vs noninfectious


Sputum cultures NRF


2.  History of Mycobacterium tuberculosis, treated in 2008.  AFB negative in 


2017 and 2019. HIV negative 2017


3.  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


4.  Leukocytosis in part reactive to steroids.- better


5.  Angioedema from Lisinopril ;


    Acute respiratory failure, status post emergent tracheostomy on 06/09/2021.


6.  Hypertension.


7.  Chronic obstructive pulmonary disease.


8.  Tobaccoism and substance abuse.


9.  Alcohol abuse


10. Thrombocytopenia


11. PCM


12. Anemia





Plan:


Plan of Care





 Cont Zosyn 6/14 and Zyvox 6/15


F/U cult and labs


sputum cult NRF


aggressive pulm toilet


steroids per pulmonary


S/P bronchoscopy 6/17


f/u cult


f/u  aspergillus serologies


Maintain aspiration precautions 


Discussed with nursing 











D/w nursing











STEPHANIE CLAYTON MD           Jun 18, 2021 07:59

## 2021-06-18 NOTE — PDOC
SURGICAL PROGRESS NOTE


DATE: 6/18/21 


TIME: 20:36


Subjective


Pt without c/o, awake and alert and interactive


Vital Signs





Vital Signs








  Date Time  Temp Pulse Resp B/P (MAP) Pulse Ox O2 Delivery O2 Flow Rate FiO2


 


6/18/21 20:19     100 Room Air  


 


6/18/21 20:00  112 23 156/102 (120)    


 


6/18/21 19:00 98.8       





 98.8       


 


6/18/21 16:00       2.0 








I&O











Intake and Output 


 


 6/18/21





 07:00


 


Intake Total 2835 ml


 


Output Total 2835 ml


 


Balance 0 ml


 


 


 


IV Total 2835 ml


 


Output Urine Total 2835 ml








General:  Alert, Oriented X3, Cooperative, No acute distress


HEENT:  Other (tracheostomy removed without difficulty, tracheotomy clean and 

patent, compressive dressing placed and pt tolerating breathing well)


Labs





Laboratory Tests








Test


 6/17/21


08:10


 


White Blood Count


 4.9 x10^3/uL


(4.0-11.0)


 


Red Blood Count


 4.08 x10^6/uL


(4.30-5.70)


 


Hemoglobin


 13.2 g/dL


(13.0-17.5)


 


Hematocrit


 39.1 %


(39.0-53.0)


 


Mean Corpuscular Volume 96 fL () 


 


Mean Corpuscular Hemoglobin 32 pg (25-35) 


 


Mean Corpuscular Hemoglobin


Concent 34 g/dL


(31-37)


 


Red Cell Distribution Width


 14.0 %


(11.5-14.5)


 


Platelet Count


 114 x10^3/uL


(140-400)


 


Neutrophils (%) (Auto) 66 % (31-73) 


 


Lymphocytes (%) (Auto) 14 % (24-48) 


 


Monocytes (%) (Auto) 19 % (0-9) 


 


Eosinophils (%) (Auto) 1 % (0-3) 


 


Basophils (%) (Auto) 0 % (0-3) 


 


Neutrophils # (Auto)


 3.2 x10^3/uL


(1.8-7.7)


 


Lymphocytes # (Auto)


 0.7 x10^3/uL


(1.0-4.8)


 


Monocytes # (Auto)


 0.9 x10^3/uL


(0.0-1.1)


 


Eosinophils # (Auto)


 0.0 x10^3/uL


(0.0-0.7)


 


Basophils # (Auto)


 0.0 x10^3/uL


(0.0-0.2)


 


Sodium Level


 140 mmol/L


(136-145)


 


Potassium Level


 3.7 mmol/L


(3.5-5.1)


 


Chloride Level


 105 mmol/L


()


 


Carbon Dioxide Level


 27 mmol/L


(21-32)


 


Anion Gap 8 (6-14) 


 


Blood Urea Nitrogen


 12 mg/dL


(8-26)


 


Creatinine


 0.4 mg/dL


(0.7-1.3)


 


Estimated GFR


(Cockcroft-Gault) 268.3 





 


Glucose Level


 86 mg/dL


(70-99)


 


Calcium Level


 9.6 mg/dL


(8.5-10.1)


 


Magnesium Level


 2.1 mg/dL


(1.8-2.4)








Problem List


Problems


Medical Problems:


(1) ACE inhibitor-aggravated angioedema


Status: Acute  








Assessment/Plan


continue care per pulm


will sign off, but please call for qeustions.





Justicifation of Admission Dx:


Justifications for Admission:


Justification of Admission Dx:  Yes











CINTHYA KNOTT MD             Jun 18, 2021 20:39

## 2021-06-18 NOTE — PDOC
PULMONARY PROGRESS NOTES


DATE: 6/18/21 


TIME: 09:08


Subjective


now decannulated on 2 liters NC 


awake and following commands 


a-febrile 


no overnight events


Vitals





Vital Signs








  Date Time  Temp Pulse Resp B/P (MAP) Pulse Ox O2 Delivery O2 Flow Rate FiO2


 


6/18/21 08:12  96  161/104    


 


6/18/21 08:00 97.5  27  95 Tracheal Collar  





 97.5       


 


6/18/21 07:40       8.0 








General:  Alert, Oriented X4


HEENT:  Other


Lungs:  Clear


Cardiovascular:  S1, S2


Abdomen:  Soft


Neuro Exam:  Alert


Extremities:  No Edema


Labs





Laboratory Tests








Test


 6/17/21


08:10


 


White Blood Count


 4.9 x10^3/uL


(4.0-11.0)


 


Red Blood Count


 4.08 x10^6/uL


(4.30-5.70)


 


Hemoglobin


 13.2 g/dL


(13.0-17.5)


 


Hematocrit


 39.1 %


(39.0-53.0)


 


Mean Corpuscular Volume 96 fL () 


 


Mean Corpuscular Hemoglobin 32 pg (25-35) 


 


Mean Corpuscular Hemoglobin


Concent 34 g/dL


(31-37)


 


Red Cell Distribution Width


 14.0 %


(11.5-14.5)


 


Platelet Count


 114 x10^3/uL


(140-400)


 


Neutrophils (%) (Auto) 66 % (31-73) 


 


Lymphocytes (%) (Auto) 14 % (24-48) 


 


Monocytes (%) (Auto) 19 % (0-9) 


 


Eosinophils (%) (Auto) 1 % (0-3) 


 


Basophils (%) (Auto) 0 % (0-3) 


 


Neutrophils # (Auto)


 3.2 x10^3/uL


(1.8-7.7)


 


Lymphocytes # (Auto)


 0.7 x10^3/uL


(1.0-4.8)


 


Monocytes # (Auto)


 0.9 x10^3/uL


(0.0-1.1)


 


Eosinophils # (Auto)


 0.0 x10^3/uL


(0.0-0.7)


 


Basophils # (Auto)


 0.0 x10^3/uL


(0.0-0.2)


 


Sodium Level


 140 mmol/L


(136-145)


 


Potassium Level


 3.7 mmol/L


(3.5-5.1)


 


Chloride Level


 105 mmol/L


()


 


Carbon Dioxide Level


 27 mmol/L


(21-32)


 


Anion Gap 8 (6-14) 


 


Blood Urea Nitrogen


 12 mg/dL


(8-26)


 


Creatinine


 0.4 mg/dL


(0.7-1.3)


 


Estimated GFR


(Cockcroft-Gault) 268.3 





 


Glucose Level


 86 mg/dL


(70-99)


 


Calcium Level


 9.6 mg/dL


(8.5-10.1)


 


Magnesium Level


 2.1 mg/dL


(1.8-2.4)








Medications





Active Scripts








 Medications  Dose


 Route/Sig


 Max Daily Dose Days Date Category


 


 Metoprolol


 Succinate ( Xl )


  (Metoprolol


 Succinate) 25 Mg


 Tab.er.24h  25 Mg


 PO DAILY


   6/8/21 Reported


 


 Hydrochlorothiazide


 Tablet


  (Hydrochlorothiazide)


 12.5 Mg Tablet  12.5 Mg


 PO DAILY


   6/8/21 Reported


 


 Ventolin Hfa


 Inhaler


  (Albuterol


 Sulfate) 18 Gm


 Hfa.aer.ad  2 Puff


 INH PRN Q4HRS PRN


   8/15/19 Reported


 


 Amlodipine


 Besylate 10 Mg


 Tablet  10 Mg


 PO DAILY


  30 9/29/17 Rx








Comments


CXR 6/18/21


Impression: 


1.  Increased right basilar consolidations and pleural effusion.








CT chest 


IMPRESSION:


  


1. New left lower lobe dependent consolidation, which may represent infection or

aspiration.





2. Stable right apical cavitation with intracavitary lesion which could 

represent aspergilloma. Additional right apical spiculated 2.4 cm nodule is 

stable from recent exams, but decreased in size from 2013.





Impression


.


ASSESSMENT:


1.  Acute respiratory failure secondary to angioedema from lisinopril.


2.  ACE inhibitor induced angioedema--improving, now S/P trach, decannulated on 

6/18/21


3.  Hypertension.


4.  Chronic obstructive pulmonary disease.


5.  Abnormal chest x-ray revealing opacity, chronic in the right upper lobe, 


will review previous radiographic studies. If the patient has not had a CT in 


the past 12 months, we will obtain CT chest.





Plan


.


Updated 6/18/2021


Continue supplemental oxygen to keep sats above 92%, now on 2 liters NC  


Follow CXR/PRN--reviewed 


Bronchoscopy on 6/17/21-- follow BAL --NGTD 


Follow surgery recs-- decannulation on 6/18/21  


NEBS


Follow ID recs for ABX, last AFB in 2019 and Aspergillosis in 2017,repeat 

cultures pending 


Continue steroids IV 


Physical therapy/Occupational Therapy/Speech-- reg. diet  with thin   


DVT/GI PPX: Marcx


D/W RN and RT





Updated 6/17/2021


Continue supplemental oxygen to keep sats above 92%, on t-shield 


avoid sedation 


Follow CXR/PRN 


PRN suctioning 


S/P bronchoscopy -- see procedure note 


Follow surgery recs-- ongoing trach, plan for decannulation in next 24-48hours  


NEBS


Follow ID recs for ABX, last AFB in 2019 and Aspergillosis in 2017,repeat 

cultures pending 


Continue steroids IV 


Physical therapy/Occupational Therapy


Continue PPN for nutritional support  


DVT/GI PPX: Gudelia


D/W RN and RT








Updated 6/16/2021


Continue supplemental oxygen to keep sats above 92%, 


Follow CXR/PRN 


PRN suctioning 


Plan for bronchoscopy in am 


Follow surgery recs-- ongoing trach 


CIWA for ETOH withdraw,precdex gtt


NEBS


Follow ID recs for ABX, last AFB in 2019 and Aspergillosis in 2017,repeat 

cultures pending 


Continue steroids IV 


Physical therapy/Occupational Therapy


Continue PPN for nutritional support  


DVT/GI PPX: Lovenox


D/W RN and RT











NADIA ANNE MD              Jun 18, 2021 09:08

## 2021-06-18 NOTE — NUR
SS following up with discharge planning. SS reviewed pt chart and discussed with pt RN. Pt 
currently has trach collar. Pt on IV Zosyn and IV Zyvox. Pt NPO and on PPN. Pt off Precedex 
now. Probable trach removal in the next 24 hours. SS will continue to follow for discharge 
planning.

## 2021-06-18 NOTE — PDOC2
NEUROLOGY CONSULT


Date of Service


DOS:


DATE: 21 


TIME: 09:22





Reason for Consult


Reason for Consult:


Seizure activity





Referring Physician


Referring Physician:


Dr. Hester





Source


Source:  Chart review, Patient





History of Present Illness


History of Present Illness


The patient is a 55-year-old right-handed male admitted on  with lip swelling

that began after starting on lisinopril.  He developed angioedema and required a

tracheostomy.  He then went into alcohol withdrawal symptoms.  Last night he had

a 15-second seizure relieved with Ativan.  He denies any prior history of 

stroke, seizure, or head injury, but does admit to drinking several beers and 

vodka a day.  He is feeling better today.





Past Medical History


Cardiovascular:  HTN


Pulmonary:  COPD, Other (Tuberculosis)


Psych:  Addictions (Alcohol, marijuana)





Past Surgical History


Past Surgical History:  Other (Tracheostomy)





Family History


Family History:  No pertinent hx (Father  of unknown cause)





Social History


Social History


Disabled, alcohol and marijuana use, also tobacco, 





Current Medications


Current Medications





Current Medications


Epinephrine HCl (Adrenalin) 0.3 mg 1X  ONCE IM  Last administered on 21at 

14:30;  Start 21 at 14:15;  Stop 21 at 14:16;  Status DC


Methylprednisolone Sodium Succinate (SOLU-Medrol 125MG VIAL) 125 mg 1X  ONCE IV 

Last administered on 21at 14:31;  Start 21 at 14:15;  Stop 21 at 

14:16;  Status DC


Ondansetron HCl (Zofran) 4 mg PRN Q8HRS  PRN IV NAUSEA/VOMITING;  Start 21 

at 15:45;  Stop 21 at 21:24;  Status DC


Fentanyl Citrate (Fentanyl 2ml Vial) 50 mcg PRN Q1HR  PRN IV PAIN;  Start 21

at 15:45;  Stop 21 at 06:54;  Status DC


Acetaminophen (Tylenol) 650 mg PRN Q4HRS  PRN PO FEVER > 100.3'F;  Start 21 

at 15:45;  Stop 21 at 21:23;  Status DC


Methylprednisolone Sodium Succinate (SOLU-Medrol 125MG VIAL) 60 mg Q6HRS IV  

Last administered on 21at 05:47;  Start 21 at 00:00;  Stop 21 at 

11:37;  Status DC


Diphenhydramine HCl (Benadryl) 25 mg Q4HRS IVP  Last administered on 21at 

09:01;  Start 21 at 20:00;  Stop 21 at 11:37;  Status DC


Epinephrine HCl (Adrenalin) 0.3 mg PRN Q5MIN  PRN IM ALLERGIC REACTION Last 

administered on 21at 05:20;  Start 21 at 16:15


Famotidine (Pepcid Vial) 20 mg BID IVP  Last administered on 21at 08:11;  

Start 21 at 16:30


Acetaminophen (Tylenol) 650 mg PRN Q6HRS  PRN PO Headaches, Temp > 101.5';  

Start 21 at 16:30


Lorazepam (Ativan Inj) 0.5 mg PRN Q6HRS  PRN IVP ANXIETY / AGITATION Last admi

nistered on 21at 09:50;  Start 21 at 16:30;  Stop 6/15/21 at 00:41;  

Status DC


Lorazepam (Ativan) 1 mg PRN Q6HRS  PRN PO ANXIETY / AGITATION;  Start 21 at 

16:30;  Stop 6/15/21 at 00:41;  Status DC


Ondansetron HCl (Zofran) 4 mg PRN Q6HRS  PRN IVP NAUSEA/VOMITING;  Start 21 

at 16:30


Al Hydroxide/Mg Hydroxide (Mylanta Plus Xs) 30 ml PRN Q3HRS  PRN PO HEARTBURN / 

GAS;  Start 21 at 16:30


Calcium Carbonate/ Glycine (Tums) 500 mg PRN Q3HRS  PRN PO HEARTBURN / GAS- 2ND 

CHOICE;  Start 21 at 16:30


Zolpidem Tartrate (Ambien) 5 mg PRN QHS  PRN PO INSOMNIA, MAY REPEAT IN 1HR;  

Start 21 at 16:30


Enoxaparin Sodium (Lovenox 40mg Syringe) 40 mg Q24H SQ  Last administered on 

21at 21:00;  Start 21 at 21:00


Sodium Chloride (Normal Saline Flush) 3 ml QSHIFT  PRN IV AFTER MEDS AND BLOOD 

DRAWS;  Start 21 at 16:30


Acetaminophen/ Hydrocodone Bitart (Lortab 5/325) 1 tab PRN Q4HRS  PRN PO PAIN;  

Start 21 at 16:30;  Stop 6/15/21 at 00:41;  Status DC


Morphine Sulfate (Morphine Sulfate) 2 mg PRN Q1HR  PRN IV PAIN;  Start 21 at

16:30;  Stop 21 at 06:55;  Status DC


Magnesium Hydroxide (Milk Of Magnesia) 2,400 mg PRN Q12HR  PRN PO CONSTIPATION; 

Start 21 at 16:30


Albuterol/ Ipratropium (Duoneb) 3 ml RTQID  PRN NEB WHEEZING;  Start 21 at 

16:30;  Stop 21 at 21:23;  Status DC


Albuterol/ Ipratropium (Duoneb) 3 ml RTQID NEB  Last administered on 21at 

07:39;  Start 21 at 08:00


Succinylcholine Chloride (Anectine) 200 mg STK-MED ONCE .ROUTE ;  Start 21 

at 05:30;  Stop 21 at 06:02;  Status DC


Etomidate (Amidate) 20 mg STK-MED ONCE IV ;  Start 21 at 05:30;  Stop 21

at 06:02;  Status DC


Propofol 100 ml @  As Directed STK-MED ONCE IV ;  Start 21 at 05:50;  Stop 

21 at 06:02;  Status DC


Fentanyl Citrate 30 ml @ 0 mls/hr CONT  PRN IV SEE PROTOCOL Last administered on

21at 00:10;  Start 21 at 07:00;  Stop 21 at 12:31;  Status DC


Propofol 100 ml @ 0 mls/hr CONT  PRN IV PER PROTOCOL Last administered on 

21at 06:36;  Start 21 at 07:00;  Stop 21 at 12:31;  Status DC


Midazolam HCl (Versed) 5 mg STK-MED ONCE .ROUTE ;  Start 21 at 07:10;  Stop 

21 at 07:10;  Status DC


Midazolam HCl (Versed) 5 mg 1X  ONCE IV  Last administered on 21at 07:19;  

Start 21 at 07:15;  Stop 21 at 07:28;  Status DC


Succinylcholine Chloride (Anectine) 200 mg 1X  ONCE IV  Last administered on 

21at 05:30;  Start 21 at 08:00;  Stop 21 at 08:01;  Status DC


Etomidate (Amidate) 20 mg 1X  ONCE IV  Last administered on 21at 05:30;  

Start 21 at 08:00;  Stop 21 at 08:01;  Status DC


Insulin Glargine (Lantus Syringe) 5 unit QHS SQ  Last administered on 6/10/21at 

20:49;  Start 21 at 21:00;  Stop 21 at 12:31;  Status DC


Insulin Human Lispro (HumaLOG) 0-9 UNITS TIDWMEALS SQ ;  Start 21 at 17:00; 

Stop 21 at 14:37;  Status DC


Dextrose (Dextrose 50%-Water Syringe) 12.5 gm PRN Q15MIN  PRN IV SEE COMMENTS;  

Start 21 at 14:00;  Stop 21 at 12:31;  Status DC


Dextrose (Iv Dextrose 5%) 250 ml PRN Q15MIN  PRN IV SEE COMMENTS;  Start 21 

at 14:00;  Status UNV


Insulin Human Lispro (HumaLOG) 0-9 UNITS Q6HRS SQ ;  Start 21 at 18:00;  

Stop 21 at 12:31;  Status DC


Amino Acids/ Glycerin/ Electrolytes 1,000 ml @  80 mls/hr [id number] IV  Last 

administered on 21at 00:27;  Start 6/10/21 at 16:00


Hydralazine HCl (Apresoline Inj) 10 mg PRN Q4HRS  PRN IVP HYPERTENSION, 2nd 

CHOICE Last administered on 21at 08:12;  Start 21 at 11:45


Labetalol HCl (Normodyne Iv Push) 20 mg PRN Q2HR  PRN IVP HYPERTENSION, 1st 

CHOICE Last administered on 21at 00:41;  Start 21 at 11:45


Morphine Sulfate (Morphine Sulfate) 5 mg PRN Q2HR  PRN IV PAIN Last administered

on 21at 09:49;  Start 21 at 14:00


Doxycycline Hyclate (Vibra-Tab) 100 mg BID PO ;  Start 21 at 21:00;  Stop 

21 at 11:50;  Status DC


Prednisone (Prednisone) 30 mg DAILY PO ;  Start 21 at 09:00;  Stop 21 

at 09:14;  Status DC


Haloperidol Lactate (Haldol Inj) 5 mg PRN Q6HRS  PRN IVP AGITATION- 2ND CHOICE 

Last administered on 21at 19:36;  Start 21 at 20:15


Dexmedetomidine HCl 400 mcg/ Sodium Chloride 100 ml @  2.635 mls/ hr CONT  PRN 

IV PER PROTOCOL Last administered on 21at 22:02;  Start 21 at 22:30


Sodium Chloride 500 ml @  500 mls/hr 1X PRN  PRN IV SEE COMMENTS;  Start 21

at 22:30


Atropine Sulfate (ATROPINE 0.5mg SYRINGE) 0.5 mg PRN Q5MIN  PRN IV SEE COMMENTS;

 Start 21 at 22:30


Olanzapine (ZyPREXA IM) 10 mg DAILY IM  Last administered on 21at 08:27;  

Start 21 at 10:00


Iohexol (Omnipaque 300 Mg/ml) 75 ml 1X  ONCE IV ;  Start 21 at 10:15;  Stop

21 at 10:16;  Status DC


Info (CONTRAST GIVEN -- Rx MONITORING) 1 each PRN DAILY  PRN MC SEE COMMENTS;  

Start 21 at 10:15;  Stop 21 at 10:14;  Status DC


Multivitamins 10 ml/Thiamine HCl 100 mg/Folic Acid 1 mg/Sodium Chloride 1,011.2 

ml  @ 100 mls/ hr DAILY IV  Last administered on 21at 09:10;  Start 21

at 11:00;  Stop 21 at 19:07;  Status DC


Lorazepam (Ativan) 4 mg PRN Q1HR  PRN PO For CIWA 8-14;  Start 21 at 10:30;

 Stop 6/15/21 at 00:41;  Status DC


Lorazepam (Ativan) 8 mg PRN Q1HR  PRN PO For CIWA 15 or greater;  Start 21 

at 10:30;  Stop 6/15/21 at 00:41;  Status DC


Clonidine HCl (Catapres) 0.1 mg PRN Q1HR  PRN PO SBP > 180 or DBP > 100, MRX3;  

Start 21 at 10:30


Lorazepam (Ativan Inj) 2 mg PRN Q15MIN  PRN IV SEE COMMENTS Last administered on

21at 04:07;  Start 21 at 10:30


Lorazepam (Ativan Inj) 4 mg PRN Q15MIN  PRN IV SEE COMMENTS Last administered on

21at 20:30;  Start 21 at 10:30


Doxycycline Hyclate 100 mg/ Dextrose 100 ml @  50 mls/hr Q12HR IV  Last 

administered on 21at 21:03;  Start 21 at 13:00;  Stop 6/15/21 at 08:08

;  Status DC


Ceftriaxone Sodium (Rocephin) 2 gm Q24H IVP  Last administered on 21at 

15:16;  Start 21 at 15:00;  Stop 21 at 08:26;  Status DC


Metronidazole 100 ml @  100 mls/hr Q12HR IV  Last administered on 21at 

20:42;  Start 21 at 21:00;  Stop 21 at 08:26;  Status DC


Methylprednisolone Sodium Succinate (SOLU-Medrol 40MG VIAL) 40 mg DAILY IV  Last

administered on 21at 08:12;  Start 21 at 10:00


Info (Icu Electrolyte Protocol) 1 ea DAILY08  PRN MC PER PROTOCOL;  Start  at 11:00


Potassium Chloride/Water 100 ml @  100 mls/hr Q1H IV  Last administered on 

21at 15:16;  Start 21 at 12:00;  Stop 21 at 15:59;  Status DC


Potassium Chloride/Water 100 ml @  100 mls/hr Q1H IV ;  Start 21 at 11:15; 

Stop 21 at 11:36;  Status DC


Potassium Chloride/Water 100 ml @  100 mls/hr Q1H IV ;  Start 21 at 11:15; 

Stop 21 at 11:36;  Status DC


Magnesium Sulfate 100 ml @  50 mls/hr DAILY IV ;  Start 21 at 09:00;  Stop 

21 at 11:36;  Status DC


Sodium Phosphate 8.33 mmol/Sodium Chloride 252.7767 ml @ 62.5 mls/hr 1X  ONCE IV

;  Start 21 at 11:15;  Stop 21 at 11:36;  Status DC


Piperacillin Sod/ Tazobactam Sod 3.375 gm/Sodium Chloride 50 ml @  100 mls/hr 

Q6HRS IV  Last administered on 21at 05:44;  Start 21 at 12:00


Diphenhydramine HCl (Benadryl) 25 mg PRN Q15MIN  PRN IVP EPS symptoms 2'Haldol 

admin Last administered on 21at 20:30;  Start 21 at 16:45


Linezolid/Dextrose 300 ml @  300 mls/hr Q12HR IV  Last administered on 21at

08:11;  Start 6/15/21 at 09:00


Lidocaine HCl (Lidocaine 2% Viscous) 100 ml PRN 1X  PRN MM FOR PROCEDURE Last 

administered on 21at 10:02;  Start 21 at 08:30;  Stop 21 at 

08:29;  Status DC


Lidocaine HCl (Lidocaine 1% 20ml Vial) 20 ml PRN 1X  PRN INJ SEE COMMENTS Last 

administered on 21at 10:03;  Start 21 at 08:30;  Stop 21 at 

08:29;  Status DC


Lidocaine HCl (Lidocaine 1% 20ml Vial) 20 ml STK-MED ONCE .ROUTE ;  Start 

21 at 08:38;  Stop 21 at 08:38;  Status DC


Lidocaine HCl (Lidocaine 2% Viscous) 100 ml STK-MED ONCE .ROUTE ;  Start 21

at 08:38;  Stop 21 at 08:38;  Status DC


Midazolam HCl (Versed) 5 mg 1X  ONCE IV  Last administered on 21at 11:59;  

Start 21 at 10:30;  Stop 21 at 10:31;  Status DC





Active Scripts


Active


Amlodipine Besylate 10 Mg Tablet 10 Mg PO DAILY 30 Days


Reported


Metoprolol Succinate ( Xl ) (Metoprolol Succinate) 25 Mg Tab.er.24h 25 Mg PO 

DAILY


Hydrochlorothiazide Tablet (Hydrochlorothiazide) 12.5 Mg Tablet 12.5 Mg PO DAILY


Ventolin Hfa Inhaler (Albuterol Sulfate) 18 Gm Hfa.aer.ad 2 Puff INH PRN Q4HRS 

PRN





Allergies


Allergies:  


Coded Allergies:  


     lisinopril (Verified  Allergy, Severe, ANGIOEDEMA, 21)





ROS


Review of System


Negative for fever, chills, weight loss, shortness of breath, chest pain, 

indigestion, hematochezia, melena, and dysuria.  Full 14-point review of systems

is negative.





Physical Exam


Physical Examination


General:


Well-developed, well-nourished black male in no acute distress


HEENT:


Normocephalic andatraumatic. Temporal arteriespulsatile and nontender.


Neck:


Supple without bruit, no meningismus


Musculoskeletal:


Stability:see neurologic. Gait exam:see neurologic. Tone:see 

neurologic.Strength:see neurologic.


Neurological:


Mental Status:intact, orientation, memory, attention span/concentration, 

language, fund of knowledge normal. Cranial Nerves:Pupils equal and reactive to

light, extraocular movements areintact, visual fields are full to 

confrontation. Facial sensation is normal. There is no facial asymmetry. 

Vestibulo-ocular reflex is intact. Palate elevates and tongue protrudes in 

midline. All other cranial related problems are negative except as mentioned 

before.Reflexes:2+ and symmetric with flexor plantar responses. Motor:4/5 

strength with normal tone and bulk.  Mild tremulousness. Coordination:Finger-

nose finger and heel-to-shin testing are normal. Rapid alternating movements and

fine finger movements are intact. Gait:not tested. Sensory:Normal pinprick, 

vibration, light touch, proprioception.





Vitals


VITALS





Vital Signs








  Date Time  Temp Pulse Resp B/P (MAP) Pulse Ox O2 Delivery O2 Flow Rate FiO2


 


21 09:00  93 24 140/87 (104) 100 Tracheal Collar  


 


21 08:00 97.5       





 97.5       


 


21 07:40       8.0 











Labs


Labs





Laboratory Tests








Test


 21


08:10


 


White Blood Count


 4.9 x10^3/uL


(4.0-11.0)


 


Red Blood Count


 4.08 x10^6/uL


(4.30-5.70)


 


Hemoglobin


 13.2 g/dL


(13.0-17.5)


 


Hematocrit


 39.1 %


(39.0-53.0)


 


Mean Corpuscular Volume 96 fL () 


 


Mean Corpuscular Hemoglobin 32 pg (25-35) 


 


Mean Corpuscular Hemoglobin


Concent 34 g/dL


(31-37)


 


Red Cell Distribution Width


 14.0 %


(11.5-14.5)


 


Platelet Count


 114 x10^3/uL


(140-400)


 


Neutrophils (%) (Auto) 66 % (31-73) 


 


Lymphocytes (%) (Auto) 14 % (24-48) 


 


Monocytes (%) (Auto) 19 % (0-9) 


 


Eosinophils (%) (Auto) 1 % (0-3) 


 


Basophils (%) (Auto) 0 % (0-3) 


 


Neutrophils # (Auto)


 3.2 x10^3/uL


(1.8-7.7)


 


Lymphocytes # (Auto)


 0.7 x10^3/uL


(1.0-4.8)


 


Monocytes # (Auto)


 0.9 x10^3/uL


(0.0-1.1)


 


Eosinophils # (Auto)


 0.0 x10^3/uL


(0.0-0.7)


 


Basophils # (Auto)


 0.0 x10^3/uL


(0.0-0.2)


 


Sodium Level


 140 mmol/L


(136-145)


 


Potassium Level


 3.7 mmol/L


(3.5-5.1)


 


Chloride Level


 105 mmol/L


()


 


Carbon Dioxide Level


 27 mmol/L


(21-32)


 


Anion Gap 8 (6-14) 


 


Blood Urea Nitrogen


 12 mg/dL


(8-26)


 


Creatinine


 0.4 mg/dL


(0.7-1.3)


 


Estimated GFR


(Cockcroft-Gault) 268.3 





 


Glucose Level


 86 mg/dL


(70-99)


 


Calcium Level


 9.6 mg/dL


(8.5-10.1)


 


Magnesium Level


 2.1 mg/dL


(1.8-2.4)











Assessment/Plan


Assessment/Plan


Impression:


Brief alcohol-related seizure, he is back to his baseline now.  He is still a 

little tremulous


Alcoholism


Recent angioedema status-post tracheostomy





Recommendations:


Given this rapid improvement I am holding off on EEG and CT studies


Continue to monitor





Thank you for letting me help with the patient's care.











TED VU MD           2021 09:26

## 2021-06-19 VITALS — DIASTOLIC BLOOD PRESSURE: 105 MMHG | SYSTOLIC BLOOD PRESSURE: 175 MMHG

## 2021-06-19 VITALS — DIASTOLIC BLOOD PRESSURE: 61 MMHG | SYSTOLIC BLOOD PRESSURE: 102 MMHG

## 2021-06-19 VITALS — DIASTOLIC BLOOD PRESSURE: 99 MMHG | SYSTOLIC BLOOD PRESSURE: 164 MMHG

## 2021-06-19 VITALS — SYSTOLIC BLOOD PRESSURE: 119 MMHG | DIASTOLIC BLOOD PRESSURE: 82 MMHG

## 2021-06-19 VITALS — DIASTOLIC BLOOD PRESSURE: 74 MMHG | SYSTOLIC BLOOD PRESSURE: 123 MMHG

## 2021-06-19 VITALS — SYSTOLIC BLOOD PRESSURE: 139 MMHG | DIASTOLIC BLOOD PRESSURE: 86 MMHG

## 2021-06-19 VITALS — SYSTOLIC BLOOD PRESSURE: 150 MMHG | DIASTOLIC BLOOD PRESSURE: 101 MMHG

## 2021-06-19 VITALS — DIASTOLIC BLOOD PRESSURE: 103 MMHG | SYSTOLIC BLOOD PRESSURE: 164 MMHG

## 2021-06-19 VITALS — SYSTOLIC BLOOD PRESSURE: 132 MMHG | DIASTOLIC BLOOD PRESSURE: 81 MMHG

## 2021-06-19 VITALS — SYSTOLIC BLOOD PRESSURE: 122 MMHG | DIASTOLIC BLOOD PRESSURE: 94 MMHG

## 2021-06-19 VITALS — SYSTOLIC BLOOD PRESSURE: 147 MMHG | DIASTOLIC BLOOD PRESSURE: 94 MMHG

## 2021-06-19 VITALS — DIASTOLIC BLOOD PRESSURE: 74 MMHG | SYSTOLIC BLOOD PRESSURE: 110 MMHG

## 2021-06-19 VITALS — SYSTOLIC BLOOD PRESSURE: 149 MMHG | DIASTOLIC BLOOD PRESSURE: 98 MMHG

## 2021-06-19 VITALS — DIASTOLIC BLOOD PRESSURE: 67 MMHG | SYSTOLIC BLOOD PRESSURE: 97 MMHG

## 2021-06-19 VITALS — SYSTOLIC BLOOD PRESSURE: 149 MMHG | DIASTOLIC BLOOD PRESSURE: 88 MMHG

## 2021-06-19 VITALS — SYSTOLIC BLOOD PRESSURE: 138 MMHG | DIASTOLIC BLOOD PRESSURE: 88 MMHG

## 2021-06-19 VITALS — SYSTOLIC BLOOD PRESSURE: 157 MMHG | DIASTOLIC BLOOD PRESSURE: 95 MMHG

## 2021-06-19 RX ADMIN — HYDRALAZINE HYDROCHLORIDE PRN MG: 20 INJECTION INTRAMUSCULAR; INTRAVENOUS at 23:07

## 2021-06-19 RX ADMIN — FAMOTIDINE SCH MG: 10 INJECTION, SOLUTION INTRAVENOUS at 21:03

## 2021-06-19 RX ADMIN — HALOPERIDOL LACTATE PRN MG: 5 INJECTION, SOLUTION INTRAMUSCULAR at 08:31

## 2021-06-19 RX ADMIN — DEXMEDETOMIDINE HYDROCHLORIDE PRN MLS/HR: 100 INJECTION, SOLUTION, CONCENTRATE INTRAVENOUS at 17:03

## 2021-06-19 RX ADMIN — IPRATROPIUM BROMIDE AND ALBUTEROL SULFATE SCH ML: .5; 3 SOLUTION RESPIRATORY (INHALATION) at 12:07

## 2021-06-19 RX ADMIN — PIPERACILLIN SODIUM AND TAZOBACTAM SODIUM SCH MLS/HR: 3; .375 INJECTION, POWDER, LYOPHILIZED, FOR SOLUTION INTRAVENOUS at 06:06

## 2021-06-19 RX ADMIN — ENOXAPARIN SODIUM SCH MG: 40 INJECTION SUBCUTANEOUS at 21:03

## 2021-06-19 RX ADMIN — IPRATROPIUM BROMIDE AND ALBUTEROL SULFATE SCH ML: .5; 3 SOLUTION RESPIRATORY (INHALATION) at 08:47

## 2021-06-19 RX ADMIN — OLANZAPINE SCH MG: 10 INJECTION, POWDER, FOR SOLUTION INTRAMUSCULAR at 07:47

## 2021-06-19 RX ADMIN — HALOPERIDOL LACTATE PRN MG: 5 INJECTION, SOLUTION INTRAMUSCULAR at 18:04

## 2021-06-19 RX ADMIN — PIPERACILLIN SODIUM AND TAZOBACTAM SODIUM SCH MLS/HR: 3; .375 INJECTION, POWDER, LYOPHILIZED, FOR SOLUTION INTRAVENOUS at 14:05

## 2021-06-19 RX ADMIN — PIPERACILLIN SODIUM AND TAZOBACTAM SODIUM SCH MLS/HR: 3; .375 INJECTION, POWDER, LYOPHILIZED, FOR SOLUTION INTRAVENOUS at 17:06

## 2021-06-19 RX ADMIN — IPRATROPIUM BROMIDE AND ALBUTEROL SULFATE SCH ML: .5; 3 SOLUTION RESPIRATORY (INHALATION) at 20:25

## 2021-06-19 RX ADMIN — METHYLPREDNISOLONE SODIUM SUCCINATE SCH MG: 40 INJECTION, POWDER, FOR SOLUTION INTRAMUSCULAR; INTRAVENOUS at 07:46

## 2021-06-19 RX ADMIN — DEXMEDETOMIDINE HYDROCHLORIDE PRN MLS/HR: 100 INJECTION, SOLUTION, CONCENTRATE INTRAVENOUS at 11:26

## 2021-06-19 RX ADMIN — PIPERACILLIN SODIUM AND TAZOBACTAM SODIUM SCH MLS/HR: 3; .375 INJECTION, POWDER, LYOPHILIZED, FOR SOLUTION INTRAVENOUS at 23:38

## 2021-06-19 RX ADMIN — FAMOTIDINE SCH MG: 10 INJECTION, SOLUTION INTRAVENOUS at 09:00

## 2021-06-19 RX ADMIN — IPRATROPIUM BROMIDE AND ALBUTEROL SULFATE SCH ML: .5; 3 SOLUTION RESPIRATORY (INHALATION) at 16:42

## 2021-06-19 NOTE — PDOC
[facility] PROGRESS NOTE


Date of Service


DOS:


DATE: 6/19/21 


TIME: 19:53





Chief Complaint


Chief Complaint


Angioedema


Systolic CHF


Hypertension





Plan:


Plan for removal trach in 1 week by general surgery


Team empiric IV antibiotics


Continue Solu-Medrol 40 mg IV daily


Withhold all ACE inhibitors indefinitely





FEN - Cardiac diet as tolerated


PPX  - Lovenox


FULL CODE


Dispo -continue ICU care





History of Present Illness


History of Present Illness


Patient is a 55-year-old male past medical history hypertension, who presents to

the ED with complaints of lip swelling that began this morning.  Patient was 

recently discharged from our service and during that time he had echocardiogram 

that showed systolic function is mildly to moderately impaired, with Ejection 

Fraction is 35-40%; there is global hypokinesis of the left ventricle; septal 

motion suggestive of conduction defect.  He was initiated on lisinopril, Toprol-

XL, and amlodipine. Patient reportedly took his prescribed lisinopril today and 

woke up from a nap with noted swelling to his lips and left side of his face.  

He initially denied any history of similar symptoms to the ED attending, but 

when family was present she did note he has a history of similar reaction to his

face and lips about 2-3 years ago.  This reaction responded with medications and

they never did figure out what the cause of this reaction was at that time.  

Denies any family history of angioedema.  Treated with Solu-Medrol and some 

epinephrine in the ER.  Will admit patient for monitoring overnight





6/9/2021: Due to concerns of swelling to tongue and compromised airway, 

anesthesia was consulted to help perform tracheostomy.  Patient currently on 

vent, FiO2 40%, PEEP 5.  Had discussion with wife at bedside, she states that 

history of similar episode occurred roughly 3 years ago and she believes this 

reaction was after taking a blood pressure medication she cannot recall.  We 

will continue supportive care.  Due to some steroid-induced hyperglycemia will 

initiate insulin treatment.  2.  Time 30 minutes spent reviewing charts, 

reviewing labs, reviewing imaging, discussion with wife bedside, discussion with

pulmonology, and discussion with RN.





6/10/2021: Afebrile, no acute overnight.  Remains on vent with FiO2 40%.  Plan 

for vacation sedation tomorrow morning.   Critical care time 30 minutes spent 

reviewing charts, reviewing labs, reviewing imaging, discussion with family, and

discussion with RN.





6/11/2021: No acute events overnight.  On trach collar with FiO2 35%, PEEP 5.  

Afebrile.  Sedation vacation planned today.  Critical care time 30 minutes spent

reviewing charts, reviewing labs, formulating discharge plan, discussion with 

RN.





6/14/2021


No acute events overnight.  Continues to be on trach ventilation.  Tolerating 

well without any complications.  No concerns from nursing.  Plan for possible 

bronchoscopy this week per pulmonology.  Will start on Zosyn per ID.  Patient's 

chart, labs, images were reviewed and discussed with RN





6/15/2021


No acute events overnight.  Patient can to be on trach shield.  Saturating 100% 

on 8 L flow rate.  Currently sedated on Precedex.  No major concerns from 

nursing.  Patient's chart, labs, images were reviewed and discussed with RN





6/16/2021


No acute events overnight.  Patient seen and examined bedside.  Tolerating trach

collar.  Started on Zyvox yesterday.  Continue with both Zyvox and Zosyn per ID.

 Pending aspergillosis serologies and cultures.  Possible trach removal next 

week per surgery.  Possible bronchoscopy will defer this to pulmonology.  

Patient's chart, labs, images were reviewed and discussed with RN





6/17/2021


No acute events overnight.  Patient seen and examined bedside.  Pending 

bronchoscopy today.  No concerns from nursing.  Tolerating trach collar at this 

time without any sedation.  Also will removal trach after bronchoscopy is 

completed.  Patient's chart, labs, images were reviewed and discussed with RN








6/19/2021


No acute events overnight.  Patient seen and examined bedside.  Trach has been 

decannulated.  Patient tolerated bronchoscopy well with some suctioning of mucus

.  Fortunately, patient had to be put back on sedation due to withdrawal 

symptoms.  Patient's chart, labs, images were reviewed and discussed with RN





Vitals/I&O


Vitals/I&O:





                                   Vital Signs








  Date Time  Temp Pulse Resp B/P (MAP) Pulse Ox O2 Delivery O2 Flow Rate FiO2


 


6/19/21 18:00  68 18 97/67 (77) 95 Room Air  


 


6/19/21 16:00 98.0       





 98.0       


 


6/18/21 16:00       2.0 














                                    I & O   


 


 6/18/21 6/18/21 6/19/21





 15:00 23:00 07:00


 


Intake Total  1130 ml 


 


Output Total 1315 ml 525 ml 560 ml


 


Balance -1315 ml 605 ml -560 ml











Physical Exam


Physical Exam:


GENERAL:  Pt alert awake mouth's words appears comfortable


HEENT:  Pupils small.  Normal conjunctivae.  Oral cavity pink.


NECK:  Tracheostomy removed


LUNGS:  scattered Rhonchi.  No accessory muscle use.


HEART:  S1 and S2, regular.


ABDOMEN:  Nondistended, soft.  No grimace or guarding to palpation.


GENITOURINARY:  Indwelling Ramirez in place.


EXTREMITIES:  No gross edema or cyanosis. Mitts


CNS alert awake,


SKIN:  Warm to touch.  No signs of rash.  Peripheral IVs look okay.


General:  Alert, Oriented X3, Cooperative, No acute distress


Heart:  Regular rate


Lungs:  Clear


Abdomen:  Normal bowel sounds, Soft


Extremities:  No clubbing, No cyanosis


Skin:  No rashes, No breakdown, Other (Tracheostomy in place)





Assessment and Plan


Assessmemt and Plan


Problems


Medical Problems:


(1) ACE inhibitor-aggravated angioedema


Status: Acute  











Comment


Review of Relevant


I have reviewed the following items elizabeth (where applicable) has been applied.





Justifications for Admission


Other Justification


Angioedema











SHIRLEY HE MD                  Jun 19, 2021 19:54

## 2021-06-19 NOTE — PDOC
Infectious Disease Note


Subjective:


Subjective


Pt alert awake


Underwent trach removal yesterday


Somewhat fidgety


no fevers





d/w RN





Vital Signs:


Vital Signs





Vital Signs








  Date Time  Temp Pulse Resp B/P (MAP) Pulse Ox O2 Delivery O2 Flow Rate FiO2


 


6/19/21 06:00  108 20  95 Room Air  


 


6/19/21 04:00 98.4       





 98.4       


 


6/18/21 16:00       2.0 











Physical Exam:


PHYSICAL EXAM


GENERAL:  Pt alert awake mouth's words appears comfortable


HEENT:  Pupils small.  Normal conjunctivae.  Oral cavity pink.


NECK:  Tracheostomy removed


LUNGS:  scattered Rhonchi.  No accessory muscle use.


HEART:  S1 and S2, regular.


ABDOMEN:  Nondistended, soft.  No grimace or guarding to palpation.


GENITOURINARY:  Indwelling Ramirez in place.


EXTREMITIES:  No gross edema or cyanosis. Mitts


CNS alert awake,


SKIN:  Warm to touch.  No signs of rash.  Peripheral IVs look okay.





Medications:


Inpatient Meds:


Medications reviewed.





Labs:


Micro


cxr 6/16








COMPARISON: CT chest dated 6/12/2021





INDICATION:55 years, Male, tracheostomy tube..





FINDINGS: 


Tracheostomy tube tip terminates approximately 7.4 cm proximal to the noah. 

Normal cardiomediastinal silhouette. Unchanged large opacity in the right lung 

apex with bronchiectatic changes and fibrosis. Similar multifocal patchy and 

reticular opacities throughout the right lung. Similar pulmonary emphysema. The 

right apical pulmonary nodule is not appreciated on the current exam. Similar 

left basilar subsegmental atelectasis. No pleural effusion or pneumothorax. No 

acute osseous process.





IMPRESSION:


1. Similar appearance of the large opacity with atelectatic and fibrotic changes

in the right lung.


2. Patchy airspace opacity in the left lung base, may represent atelectasis 

versus infiltrates.


3. Tracheostomy tube tip locates approximately 7.4 cm proximal to the noah.








CT CHEST





FINDINGS:





Lungs and Airways: Stable right apical consolidation with architectural 

distortion, bronchiectasis, calcification, and intracavitary density. Additional

right apical spiculated 2.4 cm nodule is stable from recent exams, but decreased

in size from 2013. Hyperexpanded left lung. Calcified pulmonary granulomas. New 

left lower lobe dependent consolidation. Tracheostomy.





Pleura: The pleural spaces are normal.





Heart and Mediastinum: The visualized thyroid is normal in size and attenuation.

No axillary or supraclavicular lymphadenopathy. No mediastinal, hilar or 

retrocrural lymphadenopathy. Calcified mediastinal lymph nodes consistent with 

remote granulomatous disease The heart and pericardium are within normal limits.

Ectatic ascending thoracic aorta measures 4 cm at the level of the right 

pulmonary artery. 





Abdomen: Hepatic steatosis.





Bones and Soft Tissues: Degenerative changes of the spine.





IMPRESSION:


  


1. New left lower lobe dependent consolidation, which may represent infection or

aspiration.





2. Stable right apical cavitation with intracavitary lesion which could 

represent aspergilloma. Additional right apical spiculated 2.4 cm nodule is 

stable from recent exams, but decreased in size from 2013.





Objective:


Assessment:


1.  Chronic cavitation, right upper lobe lung lesion with LLL infiltrates


    differential would be broad with bacterial,fungal or mycobacterial including

nonmycobacterial vs noninfectious


Sputum cultures NRF


Status post bronchoscopy Gram stain GPC


2.  History of Mycobacterium tuberculosis, treated in 2008.  AFB negative in 


2017 and 2019. HIV negative 2017


3.  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


    Aspergillus antigen 0.46


4.  Leukocytosis in part reactive to steroids.- better


5.  Angioedema from Lisinopril ;


    Acute respiratory failure, status post emergent tracheostomy on 06/09/2021.


6.  Hypertension.


7.  Chronic obstructive pulmonary disease.


8.  Tobaccoism and substance abuse.


9.  Alcohol abuse


10. Thrombocytopenia


11. PCM


12. Anemia





Plan:


Plan of Care





 Cont Zosyn 6/14 and Zyvox 6/15


F/U cult and labs


sputum cult NRF


aggressive pulm toilet


steroids per pulmonary


S/P bronchoscopy 6/17 GPC's, cultures pending


Monitor labs


 Maintain aspiration precautions 


Discussed with nursing











STEPHANIE CLAYTON MD           Jun 19, 2021 07:11

## 2021-06-19 NOTE — NUR
Patient unbelievably restless and continuously trying to get out of bed, pull lines, pull 
wagner. Multiple, multiple times tried to redirect/reorientate patient by ALL staff members- 
he is A&Ox4 but unable to reason with him or get him to obey commands. Posey x2 on bed- mits 
on, IVs wrapped. All PRN medications given without any success, will keep monitoring.

## 2021-06-19 NOTE — PDOC
PULMONARY PROGRESS NOTES


DATE: 6/19/21 


TIME: 12:12


Subjective


on 02   confused   soft voice   alert at times


Vitals





Vital Signs








  Date Time  Temp Pulse Resp B/P (MAP) Pulse Ox O2 Delivery O2 Flow Rate FiO2


 


6/19/21 11:00  95 20 149/88 (108) 95 Room Air  


 


6/19/21 08:00 97.8       





 97.8       


 


6/18/21 16:00       2.0 








General:  No acute distress, Confused


HEENT:  Other (nc  at )


Lungs:  Clear


Cardiovascular:  S1, S2


Abdomen:  Soft, Non-tender


Extremities:  No Edema


Skin:  Warm


Labs





Laboratory Tests








Test


 6/17/21


12:15


 


Legionella species (PCR)


 Not detected


(Not Detected)


 


Legionella pneumophila DNA


(PCR) Not detected


(Not Detected)








Medications





Active Scripts








 Medications  Dose


 Route/Sig


 Max Daily Dose Days Date Category


 


 Metoprolol


 Succinate ( Xl )


  (Metoprolol


 Succinate) 25 Mg


 Tab.er.24h  25 Mg


 PO DAILY


   6/8/21 Reported


 


 Hydrochlorothiazide


 Tablet


  (Hydrochlorothiazide)


 12.5 Mg Tablet  12.5 Mg


 PO DAILY


   6/8/21 Reported


 


 Ventolin Hfa


 Inhaler


  (Albuterol


 Sulfate) 18 Gm


 Hfa.aer.ad  2 Puff


 INH PRN Q4HRS PRN


   8/15/19 Reported


 


 Amlodipine


 Besylate 10 Mg


 Tablet  10 Mg


 PO DAILY


  30 9/29/17 Rx








Comments


reviewd  CT chest 


  


1. New left lower lobe dependent consolidation, which may represent infection or

aspiration.





2. Stable right apical cavitation with intracavitary lesion which could 

represent aspergilloma. Additional right apical spiculated 2.4 cm nodule is 

stable from recent exams, but decreased in size from 2013.





CXR 6/18/21


Impression: 


1.  Increased right basilar consolidations and pleural effusion.





Impression


.


ASSESSMENT:


1.  Acute respiratory failure secondary to angioedema from lisinopril.


2.  ACE inhibitor induced angioedema--improving, now S/P trach, decannulated on 

6/18/21


3.  Hypertension.


4.  Chronic obstructive pulmonary disease.


5.  Abnormal chest x-ray/ct of chest





Plan


.


Updated 6/19/2021


titrate fi02 to keep sats above 92%, now on 2 liters NC  


elevate hob 


aspiration fall precaution 


Follow CXR/ct--reviewed 


Bronchoscopy on 6/17/21-- follow BAL --NGTD 


Follow surgery recs-- decannulation on 6/18/21  


BD NEBS


Follow ID recs for ABX, last AFB in 2019 and Aspergillosis in 2017, fu repeat 

cultures


Continue solumedrol 40 mg daily


Physical therapy/Occupational Therapy/Speech-- reg. diet  with thin   


DVT/GI PPX: Lovenox


D/W RN and RT





Updated 6/17/2021


Continue supplemental oxygen to keep sats above 92%, on t-shield 


avoid sedation 


Follow CXR/PRN 


PRN suctioning 


S/P bronchoscopy -- see procedure note 


Follow surgery recs-- ongoing trach, plan for decannulation in next 24-48hours  


NEBS


Follow ID recs for ABX, last AFB in 2019 and Aspergillosis in 2017,repeat 

cultures pending 


Continue steroids IV 


Physical therapy/Occupational Therapy


Continue PPN for nutritional support  


DVT/GI PPX: Lovenox


D/W RN and RT








Updated 6/16/2021


Continue supplemental oxygen to keep sats above 92%, 


Follow CXR/PRN 


PRN suctioning 


Plan for bronchoscopy in am 


Follow surgery recs-- ongoing trach 


CIWA for ETOH withdraw,precdex gtt


NEBS


Follow ID recs for ABX, last AFB in 2019 and Aspergillosis in 2017,repeat 

cultures pending 


Continue steroids IV 


Physical therapy/Occupational Therapy


Continue PPN for nutritional support  


DVT/GI PPX: Lovenox


D/W RN and RT











RICK COOMBS MD               Jun 19, 2021 12:17

## 2021-06-20 VITALS — SYSTOLIC BLOOD PRESSURE: 115 MMHG | DIASTOLIC BLOOD PRESSURE: 83 MMHG

## 2021-06-20 VITALS — SYSTOLIC BLOOD PRESSURE: 140 MMHG | DIASTOLIC BLOOD PRESSURE: 84 MMHG

## 2021-06-20 VITALS — DIASTOLIC BLOOD PRESSURE: 73 MMHG | SYSTOLIC BLOOD PRESSURE: 107 MMHG

## 2021-06-20 VITALS — SYSTOLIC BLOOD PRESSURE: 124 MMHG | DIASTOLIC BLOOD PRESSURE: 82 MMHG

## 2021-06-20 VITALS — DIASTOLIC BLOOD PRESSURE: 79 MMHG | SYSTOLIC BLOOD PRESSURE: 119 MMHG

## 2021-06-20 VITALS — DIASTOLIC BLOOD PRESSURE: 85 MMHG | SYSTOLIC BLOOD PRESSURE: 121 MMHG

## 2021-06-20 VITALS — SYSTOLIC BLOOD PRESSURE: 131 MMHG | DIASTOLIC BLOOD PRESSURE: 82 MMHG

## 2021-06-20 VITALS — DIASTOLIC BLOOD PRESSURE: 73 MMHG | SYSTOLIC BLOOD PRESSURE: 127 MMHG

## 2021-06-20 VITALS — SYSTOLIC BLOOD PRESSURE: 117 MMHG | DIASTOLIC BLOOD PRESSURE: 85 MMHG

## 2021-06-20 VITALS — SYSTOLIC BLOOD PRESSURE: 139 MMHG | DIASTOLIC BLOOD PRESSURE: 90 MMHG

## 2021-06-20 VITALS — SYSTOLIC BLOOD PRESSURE: 113 MMHG | DIASTOLIC BLOOD PRESSURE: 81 MMHG

## 2021-06-20 VITALS — SYSTOLIC BLOOD PRESSURE: 134 MMHG | DIASTOLIC BLOOD PRESSURE: 86 MMHG

## 2021-06-20 VITALS — DIASTOLIC BLOOD PRESSURE: 85 MMHG | SYSTOLIC BLOOD PRESSURE: 143 MMHG

## 2021-06-20 VITALS — SYSTOLIC BLOOD PRESSURE: 147 MMHG | DIASTOLIC BLOOD PRESSURE: 92 MMHG

## 2021-06-20 VITALS — SYSTOLIC BLOOD PRESSURE: 125 MMHG | DIASTOLIC BLOOD PRESSURE: 87 MMHG

## 2021-06-20 VITALS — DIASTOLIC BLOOD PRESSURE: 94 MMHG | SYSTOLIC BLOOD PRESSURE: 136 MMHG

## 2021-06-20 VITALS — DIASTOLIC BLOOD PRESSURE: 62 MMHG | SYSTOLIC BLOOD PRESSURE: 95 MMHG

## 2021-06-20 VITALS — SYSTOLIC BLOOD PRESSURE: 103 MMHG | DIASTOLIC BLOOD PRESSURE: 71 MMHG

## 2021-06-20 VITALS — SYSTOLIC BLOOD PRESSURE: 132 MMHG | DIASTOLIC BLOOD PRESSURE: 79 MMHG

## 2021-06-20 VITALS — DIASTOLIC BLOOD PRESSURE: 84 MMHG | SYSTOLIC BLOOD PRESSURE: 141 MMHG

## 2021-06-20 VITALS — DIASTOLIC BLOOD PRESSURE: 88 MMHG | SYSTOLIC BLOOD PRESSURE: 144 MMHG

## 2021-06-20 VITALS — DIASTOLIC BLOOD PRESSURE: 57 MMHG | SYSTOLIC BLOOD PRESSURE: 86 MMHG

## 2021-06-20 VITALS — DIASTOLIC BLOOD PRESSURE: 77 MMHG | SYSTOLIC BLOOD PRESSURE: 116 MMHG

## 2021-06-20 VITALS — DIASTOLIC BLOOD PRESSURE: 76 MMHG | SYSTOLIC BLOOD PRESSURE: 104 MMHG

## 2021-06-20 LAB
ANION GAP SERPL CALC-SCNC: 9 MMOL/L (ref 6–14)
BASOPHILS # BLD AUTO: 0 X10^3/UL (ref 0–0.2)
BASOPHILS NFR BLD: 0 % (ref 0–3)
BUN SERPL-MCNC: 5 MG/DL (ref 8–26)
CALCIUM SERPL-MCNC: 8.7 MG/DL (ref 8.5–10.1)
CHLORIDE SERPL-SCNC: 105 MMOL/L (ref 98–107)
CO2 SERPL-SCNC: 28 MMOL/L (ref 21–32)
CREAT SERPL-MCNC: 0.6 MG/DL (ref 0.7–1.3)
EOSINOPHIL NFR BLD: 0.1 X10^3/UL (ref 0–0.7)
EOSINOPHIL NFR BLD: 2 % (ref 0–3)
ERYTHROCYTE [DISTWIDTH] IN BLOOD BY AUTOMATED COUNT: 13.9 % (ref 11.5–14.5)
GFR SERPLBLD BASED ON 1.73 SQ M-ARVRAT: 168 ML/MIN
GLUCOSE SERPL-MCNC: 127 MG/DL (ref 70–99)
HCT VFR BLD CALC: 34 % (ref 39–53)
HGB BLD-MCNC: 11.6 G/DL (ref 13–17.5)
LYMPHOCYTES # BLD: 0.9 X10^3/UL (ref 1–4.8)
LYMPHOCYTES NFR BLD AUTO: 19 % (ref 24–48)
MCH RBC QN AUTO: 32 PG (ref 25–35)
MCHC RBC AUTO-ENTMCNC: 34 G/DL (ref 31–37)
MCV RBC AUTO: 95 FL (ref 79–100)
MONO #: 0.8 X10^3/UL (ref 0–1.1)
MONOCYTES NFR BLD: 16 % (ref 0–9)
NEUT #: 3 X10^3/UL (ref 1.8–7.7)
NEUTROPHILS NFR BLD AUTO: 63 % (ref 31–73)
PLATELET # BLD AUTO: 136 X10^3/UL (ref 140–400)
POTASSIUM SERPL-SCNC: 2.6 MMOL/L (ref 3.5–5.1)
RBC # BLD AUTO: 3.6 X10^6/UL (ref 4.3–5.7)
SODIUM SERPL-SCNC: 142 MMOL/L (ref 136–145)
WBC # BLD AUTO: 4.8 X10^3/UL (ref 4–11)

## 2021-06-20 RX ADMIN — POTASSIUM CHLORIDE SCH MLS/HR: 7.46 INJECTION, SOLUTION INTRAVENOUS at 10:17

## 2021-06-20 RX ADMIN — FAMOTIDINE SCH MG: 10 INJECTION, SOLUTION INTRAVENOUS at 21:06

## 2021-06-20 RX ADMIN — POTASSIUM CHLORIDE SCH MLS/HR: 7.46 INJECTION, SOLUTION INTRAVENOUS at 14:31

## 2021-06-20 RX ADMIN — IPRATROPIUM BROMIDE AND ALBUTEROL SULFATE SCH ML: .5; 3 SOLUTION RESPIRATORY (INHALATION) at 08:20

## 2021-06-20 RX ADMIN — POTASSIUM CHLORIDE SCH MLS/HR: 7.46 INJECTION, SOLUTION INTRAVENOUS at 05:42

## 2021-06-20 RX ADMIN — POTASSIUM CHLORIDE SCH MLS/HR: 7.46 INJECTION, SOLUTION INTRAVENOUS at 04:06

## 2021-06-20 RX ADMIN — PIPERACILLIN SODIUM AND TAZOBACTAM SODIUM SCH MLS/HR: 3; .375 INJECTION, POWDER, LYOPHILIZED, FOR SOLUTION INTRAVENOUS at 12:40

## 2021-06-20 RX ADMIN — DEXMEDETOMIDINE HYDROCHLORIDE PRN MLS/HR: 100 INJECTION, SOLUTION, CONCENTRATE INTRAVENOUS at 12:38

## 2021-06-20 RX ADMIN — METHYLPREDNISOLONE SODIUM SUCCINATE SCH MG: 40 INJECTION, POWDER, FOR SOLUTION INTRAMUSCULAR; INTRAVENOUS at 09:12

## 2021-06-20 RX ADMIN — ENOXAPARIN SODIUM SCH MG: 40 INJECTION SUBCUTANEOUS at 21:05

## 2021-06-20 RX ADMIN — POTASSIUM CHLORIDE SCH MLS/HR: 7.46 INJECTION, SOLUTION INTRAVENOUS at 11:17

## 2021-06-20 RX ADMIN — DEXMEDETOMIDINE HYDROCHLORIDE PRN MLS/HR: 100 INJECTION, SOLUTION, CONCENTRATE INTRAVENOUS at 01:32

## 2021-06-20 RX ADMIN — POTASSIUM CHLORIDE SCH MLS/HR: 7.46 INJECTION, SOLUTION INTRAVENOUS at 09:13

## 2021-06-20 RX ADMIN — PIPERACILLIN SODIUM AND TAZOBACTAM SODIUM SCH MLS/HR: 3; .375 INJECTION, POWDER, LYOPHILIZED, FOR SOLUTION INTRAVENOUS at 18:00

## 2021-06-20 RX ADMIN — FAMOTIDINE SCH MG: 10 INJECTION, SOLUTION INTRAVENOUS at 09:13

## 2021-06-20 RX ADMIN — PIPERACILLIN SODIUM AND TAZOBACTAM SODIUM SCH MLS/HR: 3; .375 INJECTION, POWDER, LYOPHILIZED, FOR SOLUTION INTRAVENOUS at 23:56

## 2021-06-20 RX ADMIN — IPRATROPIUM BROMIDE AND ALBUTEROL SULFATE SCH ML: .5; 3 SOLUTION RESPIRATORY (INHALATION) at 20:35

## 2021-06-20 RX ADMIN — HALOPERIDOL LACTATE PRN MG: 5 INJECTION, SOLUTION INTRAMUSCULAR at 00:07

## 2021-06-20 RX ADMIN — POTASSIUM CHLORIDE SCH MLS/HR: 7.46 INJECTION, SOLUTION INTRAVENOUS at 06:45

## 2021-06-20 RX ADMIN — IPRATROPIUM BROMIDE AND ALBUTEROL SULFATE SCH ML: .5; 3 SOLUTION RESPIRATORY (INHALATION) at 12:24

## 2021-06-20 RX ADMIN — PIPERACILLIN SODIUM AND TAZOBACTAM SODIUM SCH MLS/HR: 3; .375 INJECTION, POWDER, LYOPHILIZED, FOR SOLUTION INTRAVENOUS at 05:48

## 2021-06-20 RX ADMIN — POTASSIUM CHLORIDE SCH MLS/HR: 7.46 INJECTION, SOLUTION INTRAVENOUS at 12:38

## 2021-06-20 RX ADMIN — IPRATROPIUM BROMIDE AND ALBUTEROL SULFATE SCH ML: .5; 3 SOLUTION RESPIRATORY (INHALATION) at 16:21

## 2021-06-20 NOTE — PDOC
[facility] PROGRESS NOTE


Date of Service


DOS:


DATE: 6/20/21 


TIME: 10:39





Chief Complaint


Chief Complaint


Angioedema


Systolic CHF


Hypertension





Plan:


Plan for removal trach in 1 week by general surgery


Team empiric IV antibiotics


Continue Solu-Medrol 40 mg IV daily


Withhold all ACE inhibitors indefinitely





FEN - Cardiac diet as tolerated


PPX  - Lovenox


FULL CODE


Dispo -continue ICU care





History of Present Illness


History of Present Illness


Patient is a 55-year-old male past medical history hypertension, who presents to

the ED with complaints of lip swelling that began this morning.  Patient was 

recently discharged from our service and during that time he had echocardiogram 

that showed systolic function is mildly to moderately impaired, with Ejection 

Fraction is 35-40%; there is global hypokinesis of the left ventricle; septal 

motion suggestive of conduction defect.  He was initiated on lisinopril, Toprol-

XL, and amlodipine. Patient reportedly took his prescribed lisinopril today and 

woke up from a nap with noted swelling to his lips and left side of his face.  

He initially denied any history of similar symptoms to the ED attending, but 

when family was present she did note he has a history of similar reaction to his

face and lips about 2-3 years ago.  This reaction responded with medications and

they never did figure out what the cause of this reaction was at that time.  

Denies any family history of angioedema.  Treated with Solu-Medrol and some 

epinephrine in the ER.  Will admit patient for monitoring overnight





6/9/2021: Due to concerns of swelling to tongue and compromised airway, 

anesthesia was consulted to help perform tracheostomy.  Patient currently on 

vent, FiO2 40%, PEEP 5.  Had discussion with wife at bedside, she states that 

history of similar episode occurred roughly 3 years ago and she believes this 

reaction was after taking a blood pressure medication she cannot recall.  We 

will continue supportive care.  Due to some steroid-induced hyperglycemia will 

initiate insulin treatment.  2.  Time 30 minutes spent reviewing charts, 

reviewing labs, reviewing imaging, discussion with wife bedside, discussion with

pulmonology, and discussion with RN.





6/10/2021: Afebrile, no acute overnight.  Remains on vent with FiO2 40%.  Plan 

for vacation sedation tomorrow morning.   Critical care time 30 minutes spent 

reviewing charts, reviewing labs, reviewing imaging, discussion with family, and

discussion with RN.





6/11/2021: No acute events overnight.  On trach collar with FiO2 35%, PEEP 5.  

Afebrile.  Sedation vacation planned today.  Critical care time 30 minutes spent

reviewing charts, reviewing labs, formulating discharge plan, discussion with 

RN.





6/14/2021


No acute events overnight.  Continues to be on trach ventilation.  Tolerating 

well without any complications.  No concerns from nursing.  Plan for possible 

bronchoscopy this week per pulmonology.  Will start on Zosyn per ID.  Patient's 

chart, labs, images were reviewed and discussed with RN





6/15/2021


No acute events overnight.  Patient can to be on trach shield.  Saturating 100% 

on 8 L flow rate.  Currently sedated on Precedex.  No major concerns from 

nursing.  Patient's chart, labs, images were reviewed and discussed with RN





6/16/2021


No acute events overnight.  Patient seen and examined bedside.  Tolerating trach

collar.  Started on Zyvox yesterday.  Continue with both Zyvox and Zosyn per ID.

 Pending aspergillosis serologies and cultures.  Possible trach removal next 

week per surgery.  Possible bronchoscopy will defer this to pulmonology.  

Patient's chart, labs, images were reviewed and discussed with RN





6/17/2021


No acute events overnight.  Patient seen and examined bedside.  Pending 

bronchoscopy today.  No concerns from nursing.  Tolerating trach collar at this 

time without any sedation.  Also will removal trach after bronchoscopy is 

completed.  Patient's chart, labs, images were reviewed and discussed with RN








6/19/2021


No acute events overnight.  Patient seen and examined bedside.  Trach has been 

decannulated.  Patient tolerated bronchoscopy well with some suctioning of mucus

.  Fortunately, patient had to be put back on sedation due to withdrawal 

symptoms.  Patient's chart, labs, images were reviewed and discussed with RN





6/20/2021


No acute events overnight.  Patient overnight did actually have some agitation 

and hallucinations.  Please see nurses note for details.  We will try to adjust 

Haldol to 3 mg instead of 5 mg.  We will push Zyprexa to be given at night 

instead of during the day.  My goal is to attempt to try to have patient more 

awake during the day and still control his withdrawal symptoms.  This is 

considered fairly late for DTs.  Patient's chart, labs, images were reviewed and

discussed with RN





Vitals/I&O


Vitals/I&O:





                                   Vital Signs








  Date Time  Temp Pulse Resp B/P (MAP) Pulse Ox O2 Delivery O2 Flow Rate FiO2


 


6/20/21 10:13  71 14 113/81 (92) 99 Room Air  


 


6/20/21 07:19 97.5       





 97.5       


 


6/19/21 20:00        














                                    I & O   


 


 6/19/21 6/19/21 6/20/21





 15:00 23:00 07:00


 


Intake Total  939.7 ml 381 ml


 


Output Total 700 ml 635 ml 525 ml


 


Balance -700 ml 304.7 ml -144 ml











Physical Exam


Physical Exam:


GENERAL:  Pt alert awake mouth's words appears comfortable


HEENT:  Pupils small.  Normal conjunctivae.  Oral cavity pink.


NECK:  Tracheostomy removed


LUNGS:  scattered Rhonchi.  No accessory muscle use.


HEART:  S1 and S2, regular.


ABDOMEN:  Nondistended, soft.  No grimace or guarding to palpation.


GENITOURINARY:  Indwelling Ramirez in place.


EXTREMITIES:  No gross edema or cyanosis. Mitts


CNS alert awake,


SKIN:  Warm to touch.  No signs of rash.  Peripheral IVs look okay.


General:  Alert, Oriented X3, Cooperative, No acute distress


Heart:  Regular rate


Lungs:  Clear


Abdomen:  Normal bowel sounds, Soft


Extremities:  No clubbing, No cyanosis


Skin:  No rashes, No breakdown, Other (Tracheostomy in place)





Labs


Labs:





Laboratory Tests








Test


 6/20/21


02:20


 


White Blood Count


 4.8 x10^3/uL


(4.0-11.0)


 


Red Blood Count


 3.60 x10^6/uL


(4.30-5.70)


 


Hemoglobin


 11.6 g/dL


(13.0-17.5)


 


Hematocrit


 34.0 %


(39.0-53.0)


 


Mean Corpuscular Volume 95 fL () 


 


Mean Corpuscular Hemoglobin 32 pg (25-35) 


 


Mean Corpuscular Hemoglobin


Concent 34 g/dL


(31-37)


 


Red Cell Distribution Width


 13.9 %


(11.5-14.5)


 


Platelet Count


 136 x10^3/uL


(140-400)


 


Neutrophils (%) (Auto) 63 % (31-73) 


 


Lymphocytes (%) (Auto) 19 % (24-48) 


 


Monocytes (%) (Auto) 16 % (0-9) 


 


Eosinophils (%) (Auto) 2 % (0-3) 


 


Basophils (%) (Auto) 0 % (0-3) 


 


Neutrophils # (Auto)


 3.0 x10^3/uL


(1.8-7.7)


 


Lymphocytes # (Auto)


 0.9 x10^3/uL


(1.0-4.8)


 


Monocytes # (Auto)


 0.8 x10^3/uL


(0.0-1.1)


 


Eosinophils # (Auto)


 0.1 x10^3/uL


(0.0-0.7)


 


Basophils # (Auto)


 0.0 x10^3/uL


(0.0-0.2)


 


Sodium Level


 142 mmol/L


(136-145)


 


Potassium Level


 2.6 mmol/L


(3.5-5.1)


 


Chloride Level


 105 mmol/L


()


 


Carbon Dioxide Level


 28 mmol/L


(21-32)


 


Anion Gap 9 (6-14) 


 


Blood Urea Nitrogen 5 mg/dL (8-26) 


 


Creatinine


 0.6 mg/dL


(0.7-1.3)


 


Estimated GFR


(Cockcroft-Gault) 168.0 





 


Glucose Level


 127 mg/dL


(70-99)


 


Calcium Level


 8.7 mg/dL


(8.5-10.1)











Assessment and Plan


Assessmemt and Plan


Problems


Medical Problems:


(1) ACE inhibitor-aggravated angioedema


Status: Acute  











Comment


Review of Relevant


I have reviewed the following items elizabeth (where applicable) has been applied.


Medications:





Current Medications








 Medications


  (Trade)  Dose


 Ordered  Sig/Yg


 Route


 PRN Reason  Start Time


 Stop Time Status Last Admin


Dose Admin


 


 Potassium


 Chloride/Water  100 ml @ 


 100 mls/hr  Q1H


 IV


   6/20/21 04:00


 6/20/21 11:59  6/20/21 10:17














Justifications for Admission


Other Justification


Angioedema











SHIRLEY HE MD                  Jun 20, 2021 10:44

## 2021-06-20 NOTE — PDOC
Infectious Disease Note


Subjective:


Subjective


Patient sedated 


Overnight had agitation and hallucinations.  


Back on [place]





d/w RN





Vital Signs:


Vital Signs





Vital Signs








  Date Time  Temp Pulse Resp B/P (MAP) Pulse Ox O2 Delivery O2 Flow Rate FiO2


 


6/20/21 11:04  69 17 134/86 (102) 98 Room Air  


 


6/20/21 07:19 97.5       





 97.5       


 


6/19/21 20:00        











Physical Exam:


PHYSICAL EXAM


GENERAL: Sedated, comfortable


HEENT:  Pupils small.  Normal conjunctivae.  Oral cavity pink.


NECK:  Tracheostomy removed


LUNGS:  scattered Rhonchi.  No accessory muscle use.


HEART:  S1 and S2, regular.


ABDOMEN:  Nondistended, soft.  No grimace or guarding to palpation.


GENITOURINARY:  Indwelling Ramirez in place.


EXTREMITIES:  No gross edema or cyanosis. Mitts


CNS sedated


SKIN:  Warm to touch.  No signs of rash.  Peripheral IVs look okay.





Medications:


Inpatient Meds:


Medications reviewed.





Labs:


Lab





Laboratory Tests








Test


 6/20/21


02:20


 


White Blood Count


 4.8 x10^3/uL


(4.0-11.0)


 


Red Blood Count


 3.60 x10^6/uL


(4.30-5.70)


 


Hemoglobin


 11.6 g/dL


(13.0-17.5)


 


Hematocrit


 34.0 %


(39.0-53.0)


 


Mean Corpuscular Volume 95 fL () 


 


Mean Corpuscular Hemoglobin 32 pg (25-35) 


 


Mean Corpuscular Hemoglobin


Concent 34 g/dL


(31-37)


 


Red Cell Distribution Width


 13.9 %


(11.5-14.5)


 


Platelet Count


 136 x10^3/uL


(140-400)


 


Neutrophils (%) (Auto) 63 % (31-73) 


 


Lymphocytes (%) (Auto) 19 % (24-48) 


 


Monocytes (%) (Auto) 16 % (0-9) 


 


Eosinophils (%) (Auto) 2 % (0-3) 


 


Basophils (%) (Auto) 0 % (0-3) 


 


Neutrophils # (Auto)


 3.0 x10^3/uL


(1.8-7.7)


 


Lymphocytes # (Auto)


 0.9 x10^3/uL


(1.0-4.8)


 


Monocytes # (Auto)


 0.8 x10^3/uL


(0.0-1.1)


 


Eosinophils # (Auto)


 0.1 x10^3/uL


(0.0-0.7)


 


Basophils # (Auto)


 0.0 x10^3/uL


(0.0-0.2)


 


Sodium Level


 142 mmol/L


(136-145)


 


Potassium Level


 2.6 mmol/L


(3.5-5.1)


 


Chloride Level


 105 mmol/L


()


 


Carbon Dioxide Level


 28 mmol/L


(21-32)


 


Anion Gap 9 (6-14) 


 


Blood Urea Nitrogen 5 mg/dL (8-26) 


 


Creatinine


 0.6 mg/dL


(0.7-1.3)


 


Estimated GFR


(Cockcroft-Gault) 168.0 





 


Glucose Level


 127 mg/dL


(70-99)


 


Calcium Level


 8.7 mg/dL


(8.5-10.1)








Micro


cxr 6/16








COMPARISON: CT chest dated 6/12/2021





INDICATION:55 years, Male, tracheostomy tube..





FINDINGS: 


Tracheostomy tube tip terminates approximately 7.4 cm proximal to the noah. 

Normal cardiomediastinal silhouette. Unchanged large opacity in the right lung 

apex with bronchiectatic changes and fibrosis. Similar multifocal patchy and 

reticular opacities throughout the right lung. Similar pulmonary emphysema. The 

right apical pulmonary nodule is not appreciated on the current exam. Similar 

left basilar subsegmental atelectasis. No pleural effusion or pneumothorax. No 

acute osseous process.





IMPRESSION:


1. Similar appearance of the large opacity with atelectatic and fibrotic changes

in the right lung.


2. Patchy airspace opacity in the left lung base, may represent atelectasis 

versus infiltrates.


3. Tracheostomy tube tip locates approximately 7.4 cm proximal to the noah.








CT CHEST





FINDINGS:





Lungs and Airways: Stable right apical consolidation with architectural 

distortion, bronchiectasis, calcification, and intracavitary density. Additional

right apical spiculated 2.4 cm nodule is stable from recent exams, but decreased

in size from 2013. Hyperexpanded left lung. Calcified pulmonary granulomas. New 

left lower lobe dependent consolidation. Tracheostomy.





Pleura: The pleural spaces are normal.





Heart and Mediastinum: The visualized thyroid is normal in size and attenuation.

No axillary or supraclavicular lymphadenopathy. No mediastinal, hilar or 

retrocrural lymphadenopathy. Calcified mediastinal lymph nodes consistent with 

remote granulomatous disease The heart and pericardium are within normal limits.

Ectatic ascending thoracic aorta measures 4 cm at the level of the right 

pulmonary artery. 





Abdomen: Hepatic steatosis.





Bones and Soft Tissues: Degenerative changes of the spine.





IMPRESSION:


  


1. New left lower lobe dependent consolidation, which may represent infection or

aspiration.





2. Stable right apical cavitation with intracavitary lesion which could 

represent aspergilloma. Additional right apical spiculated 2.4 cm nodule is 

stable from recent exams, but decreased in size from 2013.





Objective:


Assessment:


1.  Chronic cavitation, right upper lobe lung lesion with LLL infiltrates


    differential would be broad with bacterial,fungal or mycobacterial including

nonmycobacterial vs noninfectious


Sputum cultures NRF


Status post bronchoscopy Gram stain GPC


2.  History of Mycobacterium tuberculosis, treated in 2008.  AFB negative in 


2017 and 2019. HIV negative 2017


3.  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


    Aspergillus antigen 0.46


4.  Leukocytosis in part reactive to steroids.- better


5.  Angioedema from Lisinopril ;


    Acute respiratory failure, status post emergent tracheostomy on 06/09/2021.


6.  Hypertension.


7.  Chronic obstructive pulmonary disease.


8.  Tobaccoism and substance abuse.


9.  Alcohol abuse


10. Thrombocytopenia


11. PCM


12. Anemia





Plan:


Plan of Care





 Cont Zosyn 6/14 and Zyvox 6/15


F/U cult and labs


sputum cult NRF


aggressive pulm toilet


steroids per pulmonary


S/P bronchoscopy 6/17 GPC's, cultures pending


Monitor labs


 Maintain aspiration precautions 





Discussed with nursing











STEPHANIE CLAYTON MD           Jun 20, 2021 11:12

## 2021-06-20 NOTE — PDOC
PULMONARY PROGRESS NOTES


DATE: 6/20/21 


TIME: 08:38


Subjective


very agitated overnight   on precedex now  on RA  sleep


Vitals





Vital Signs








  Date Time  Temp Pulse Resp B/P (MAP) Pulse Ox O2 Delivery O2 Flow Rate FiO2


 


6/20/21 08:15     99 Room Air  


 


6/20/21 08:07  62 16 115/83 (94)    


 


6/20/21 07:19 97.5       





 97.5       


 


6/19/21 20:00        








General:  No acute distress, Confused


HEENT:  Other (nc  at )


Lungs:  Clear


Cardiovascular:  S1, S2


Abdomen:  Soft, Non-tender


Extremities:  No Edema


Skin:  Warm


Labs





Laboratory Tests








Test


 6/20/21


02:20


 


White Blood Count


 4.8 x10^3/uL


(4.0-11.0)


 


Red Blood Count


 3.60 x10^6/uL


(4.30-5.70)


 


Hemoglobin


 11.6 g/dL


(13.0-17.5)


 


Hematocrit


 34.0 %


(39.0-53.0)


 


Mean Corpuscular Volume 95 fL () 


 


Mean Corpuscular Hemoglobin 32 pg (25-35) 


 


Mean Corpuscular Hemoglobin


Concent 34 g/dL


(31-37)


 


Red Cell Distribution Width


 13.9 %


(11.5-14.5)


 


Platelet Count


 136 x10^3/uL


(140-400)


 


Neutrophils (%) (Auto) 63 % (31-73) 


 


Lymphocytes (%) (Auto) 19 % (24-48) 


 


Monocytes (%) (Auto) 16 % (0-9) 


 


Eosinophils (%) (Auto) 2 % (0-3) 


 


Basophils (%) (Auto) 0 % (0-3) 


 


Neutrophils # (Auto)


 3.0 x10^3/uL


(1.8-7.7)


 


Lymphocytes # (Auto)


 0.9 x10^3/uL


(1.0-4.8)


 


Monocytes # (Auto)


 0.8 x10^3/uL


(0.0-1.1)


 


Eosinophils # (Auto)


 0.1 x10^3/uL


(0.0-0.7)


 


Basophils # (Auto)


 0.0 x10^3/uL


(0.0-0.2)


 


Sodium Level


 142 mmol/L


(136-145)


 


Potassium Level


 2.6 mmol/L


(3.5-5.1)


 


Chloride Level


 105 mmol/L


()


 


Carbon Dioxide Level


 28 mmol/L


(21-32)


 


Anion Gap 9 (6-14) 


 


Blood Urea Nitrogen 5 mg/dL (8-26) 


 


Creatinine


 0.6 mg/dL


(0.7-1.3)


 


Estimated GFR


(Cockcroft-Gault) 168.0 





 


Glucose Level


 127 mg/dL


(70-99)


 


Calcium Level


 8.7 mg/dL


(8.5-10.1)








Laboratory Tests








Test


 6/20/21


02:20


 


White Blood Count


 4.8 x10^3/uL


(4.0-11.0)


 


Red Blood Count


 3.60 x10^6/uL


(4.30-5.70)


 


Hemoglobin


 11.6 g/dL


(13.0-17.5)


 


Hematocrit


 34.0 %


(39.0-53.0)


 


Mean Corpuscular Volume 95 fL () 


 


Mean Corpuscular Hemoglobin 32 pg (25-35) 


 


Mean Corpuscular Hemoglobin


Concent 34 g/dL


(31-37)


 


Red Cell Distribution Width


 13.9 %


(11.5-14.5)


 


Platelet Count


 136 x10^3/uL


(140-400)


 


Neutrophils (%) (Auto) 63 % (31-73) 


 


Lymphocytes (%) (Auto) 19 % (24-48) 


 


Monocytes (%) (Auto) 16 % (0-9) 


 


Eosinophils (%) (Auto) 2 % (0-3) 


 


Basophils (%) (Auto) 0 % (0-3) 


 


Neutrophils # (Auto)


 3.0 x10^3/uL


(1.8-7.7)


 


Lymphocytes # (Auto)


 0.9 x10^3/uL


(1.0-4.8)


 


Monocytes # (Auto)


 0.8 x10^3/uL


(0.0-1.1)


 


Eosinophils # (Auto)


 0.1 x10^3/uL


(0.0-0.7)


 


Basophils # (Auto)


 0.0 x10^3/uL


(0.0-0.2)


 


Sodium Level


 142 mmol/L


(136-145)


 


Potassium Level


 2.6 mmol/L


(3.5-5.1)


 


Chloride Level


 105 mmol/L


()


 


Carbon Dioxide Level


 28 mmol/L


(21-32)


 


Anion Gap 9 (6-14) 


 


Blood Urea Nitrogen 5 mg/dL (8-26) 


 


Creatinine


 0.6 mg/dL


(0.7-1.3)


 


Estimated GFR


(Cockcroft-Gault) 168.0 





 


Glucose Level


 127 mg/dL


(70-99)


 


Calcium Level


 8.7 mg/dL


(8.5-10.1)








Medications





Active Scripts








 Medications  Dose


 Route/Sig


 Max Daily Dose Days Date Category


 


 Metoprolol


 Succinate ( Xl )


  (Metoprolol


 Succinate) 25 Mg


 Tab.er.24h  25 Mg


 PO DAILY


   6/8/21 Reported


 


 Hydrochlorothiazide


 Tablet


  (Hydrochlorothiazide)


 12.5 Mg Tablet  12.5 Mg


 PO DAILY


   6/8/21 Reported


 


 Ventolin Hfa


 Inhaler


  (Albuterol


 Sulfate) 18 Gm


 Hfa.aer.ad  2 Puff


 INH PRN Q4HRS PRN


   8/15/19 Reported


 


 Amlodipine


 Besylate 10 Mg


 Tablet  10 Mg


 PO DAILY


  30 9/29/17 Rx








Comments


reviewd  CT chest 


  


1. New left lower lobe dependent consolidation, which may represent infection or

aspiration.





2. Stable right apical cavitation with intracavitary lesion which could represen

t aspergilloma. Additional right apical spiculated 2.4 cm nodule is stable from 

recent exams, but decreased in size from 2013.





CXR 6/18/21


Impression: 


1.  Increased right basilar consolidations and pleural effusion.





Impression


.


ASSESSMENT:


1.  Acute respiratory failure secondary to angioedema from lisinopril.


2.  ACE inhibitor induced angioedema--improving, now S/P trach, decannulated on 

6/18/21


3.  Hypertension.


4.  Chronic obstructive pulmonary disease.


5.  Abnormal chest x-ray/ct of chest





Plan


.


Updated 6/20/2021


titrate fi02 to keep sats above 92%, on RA


elevate hob 


aspiration fall precaution 


Follow CXR/ct--reviewed   need fu ct in 6 weeks 


Bronchoscopy on 6/17/21-- follow BAL --NGTD 


Follow surgery recs-- decannulation on 6/18/21  


BD NEBS


Follow ID recs for ABX, last AFB in 2019 and Aspergillosis in 2017, fu repeat 

cultures


Continue solumedrol 40 mg daily


Physical therapy/Occupational Therapy/Speech-- reg. diet  with thin   


DVT/GI PPX: Lovenox


D/W RN and RT





Updated 6/17/2021


Continue supplemental oxygen to keep sats above 92%, on t-shield 


avoid sedation 


Follow CXR/PRN 


PRN suctioning 


S/P bronchoscopy -- see procedure note 


Follow surgery recs-- ongoing trach, plan for decannulation in next 24-48hours  


NEBS


Follow ID recs for ABX, last AFB in 2019 and Aspergillosis in 2017,repeat 

cultures pending 


Continue steroids IV 


Physical therapy/Occupational Therapy


Continue PPN for nutritional support  


DVT/GI PPX: Loveslavax


D/W RN and RT








Updated 6/16/2021


Continue supplemental oxygen to keep sats above 92%, 


Follow CXR/PRN 


PRN suctioning 


Plan for bronchoscopy in am 


Follow surgery recs-- ongoing trach 


CIWA for ETOH withdraw,precdex gtt


NEBS


Follow ID recs for ABX, last AFB in 2019 and Aspergillosis in 2017,repeat 

cultures pending 


Continue steroids IV 


Physical therapy/Occupational Therapy


Continue PPN for nutritional support  


DVT/GI PPX: Lovenox


D/W RN and RT











RICK COOMBS MD               Jun 20, 2021 08:40
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
## 2021-06-21 VITALS — SYSTOLIC BLOOD PRESSURE: 135 MMHG | DIASTOLIC BLOOD PRESSURE: 98 MMHG

## 2021-06-21 VITALS — SYSTOLIC BLOOD PRESSURE: 147 MMHG | DIASTOLIC BLOOD PRESSURE: 87 MMHG

## 2021-06-21 VITALS — SYSTOLIC BLOOD PRESSURE: 158 MMHG | DIASTOLIC BLOOD PRESSURE: 92 MMHG

## 2021-06-21 VITALS — DIASTOLIC BLOOD PRESSURE: 86 MMHG | SYSTOLIC BLOOD PRESSURE: 131 MMHG

## 2021-06-21 VITALS — DIASTOLIC BLOOD PRESSURE: 78 MMHG | SYSTOLIC BLOOD PRESSURE: 145 MMHG

## 2021-06-21 VITALS — DIASTOLIC BLOOD PRESSURE: 88 MMHG | SYSTOLIC BLOOD PRESSURE: 166 MMHG

## 2021-06-21 VITALS — SYSTOLIC BLOOD PRESSURE: 163 MMHG | DIASTOLIC BLOOD PRESSURE: 92 MMHG

## 2021-06-21 VITALS — DIASTOLIC BLOOD PRESSURE: 102 MMHG | SYSTOLIC BLOOD PRESSURE: 178 MMHG

## 2021-06-21 VITALS — DIASTOLIC BLOOD PRESSURE: 109 MMHG | SYSTOLIC BLOOD PRESSURE: 181 MMHG

## 2021-06-21 VITALS — SYSTOLIC BLOOD PRESSURE: 141 MMHG | DIASTOLIC BLOOD PRESSURE: 86 MMHG

## 2021-06-21 LAB
ANION GAP SERPL CALC-SCNC: 12 MMOL/L (ref 6–14)
BASOPHILS # BLD AUTO: 0 X10^3/UL (ref 0–0.2)
BASOPHILS NFR BLD: 0 % (ref 0–3)
BUN SERPL-MCNC: 12 MG/DL (ref 8–26)
CALCIUM SERPL-MCNC: 8.9 MG/DL (ref 8.5–10.1)
CHLORIDE SERPL-SCNC: 106 MMOL/L (ref 98–107)
CO2 SERPL-SCNC: 24 MMOL/L (ref 21–32)
CREAT SERPL-MCNC: 0.8 MG/DL (ref 0.7–1.3)
EOSINOPHIL NFR BLD: 0 X10^3/UL (ref 0–0.7)
EOSINOPHIL NFR BLD: 1 % (ref 0–3)
ERYTHROCYTE [DISTWIDTH] IN BLOOD BY AUTOMATED COUNT: 14 % (ref 11.5–14.5)
GFR SERPLBLD BASED ON 1.73 SQ M-ARVRAT: 120.6 ML/MIN
GLUCOSE SERPL-MCNC: 91 MG/DL (ref 70–99)
HCT VFR BLD CALC: 33.5 % (ref 39–53)
HGB BLD-MCNC: 11.5 G/DL (ref 13–17.5)
LYMPHOCYTES # BLD: 1.2 X10^3/UL (ref 1–4.8)
LYMPHOCYTES NFR BLD AUTO: 13 % (ref 24–48)
MCH RBC QN AUTO: 33 PG (ref 25–35)
MCHC RBC AUTO-ENTMCNC: 34 G/DL (ref 31–37)
MCV RBC AUTO: 95 FL (ref 79–100)
MONO #: 1 X10^3/UL (ref 0–1.1)
MONOCYTES NFR BLD: 11 % (ref 0–9)
NEUT #: 6.8 X10^3/UL (ref 1.8–7.7)
NEUTROPHILS NFR BLD AUTO: 75 % (ref 31–73)
PLATELET # BLD AUTO: 179 X10^3/UL (ref 140–400)
POTASSIUM SERPL-SCNC: 3.4 MMOL/L (ref 3.5–5.1)
RBC # BLD AUTO: 3.53 X10^6/UL (ref 4.3–5.7)
SODIUM SERPL-SCNC: 142 MMOL/L (ref 136–145)
WBC # BLD AUTO: 9.1 X10^3/UL (ref 4–11)

## 2021-06-21 RX ADMIN — PIPERACILLIN SODIUM AND TAZOBACTAM SODIUM SCH MLS/HR: 3; .375 INJECTION, POWDER, LYOPHILIZED, FOR SOLUTION INTRAVENOUS at 12:12

## 2021-06-21 RX ADMIN — IPRATROPIUM BROMIDE AND ALBUTEROL SULFATE SCH ML: .5; 3 SOLUTION RESPIRATORY (INHALATION) at 12:09

## 2021-06-21 RX ADMIN — PIPERACILLIN SODIUM AND TAZOBACTAM SODIUM SCH MLS/HR: 3; .375 INJECTION, POWDER, LYOPHILIZED, FOR SOLUTION INTRAVENOUS at 05:59

## 2021-06-21 RX ADMIN — GABAPENTIN SCH MG: 300 CAPSULE ORAL at 14:19

## 2021-06-21 RX ADMIN — IPRATROPIUM BROMIDE AND ALBUTEROL SULFATE SCH ML: .5; 3 SOLUTION RESPIRATORY (INHALATION) at 08:11

## 2021-06-21 RX ADMIN — METHYLPREDNISOLONE SODIUM SUCCINATE SCH MG: 40 INJECTION, POWDER, FOR SOLUTION INTRAMUSCULAR; INTRAVENOUS at 09:08

## 2021-06-21 RX ADMIN — IPRATROPIUM BROMIDE AND ALBUTEROL SULFATE SCH ML: .5; 3 SOLUTION RESPIRATORY (INHALATION) at 16:04

## 2021-06-21 RX ADMIN — GABAPENTIN SCH MG: 300 CAPSULE ORAL at 09:06

## 2021-06-21 RX ADMIN — FAMOTIDINE SCH MG: 10 INJECTION, SOLUTION INTRAVENOUS at 09:07

## 2021-06-21 NOTE — PDOC3
Discharge Summary


Visit Information


Date of Admission:  Jun 8, 2021


Date of Discharge:  Jun 21, 2021


Admitting Diagnosis:  Angioedema


Final Diagnosis


Problems


Medical Problems:


(1) ACE inhibitor-aggravated angioedema


Status: Acute  











Brief Hospital Course


Allergies





                                    Allergies








Coded Allergies Type Severity Reaction Last Updated Verified


 


  lisinopril Allergy Severe ANGIOEDEMA 6/8/21 Yes








Vital Signs





Vital Signs








  Date Time  Temp Pulse Resp B/P (MAP) Pulse Ox O2 Delivery O2 Flow Rate FiO2


 


6/21/21 16:04      Room Air  


 


6/21/21 15:00 98.0 70 16 135/98 (110) 98   





 98.0       








Lab Results





Laboratory Tests








Test


 6/20/21


02:20 6/21/21


06:30


 


White Blood Count


 4.8 x10^3/uL


(4.0-11.0) 9.1 x10^3/uL


(4.0-11.0)


 


Red Blood Count


 3.60 x10^6/uL


(4.30-5.70) 3.53 x10^6/uL


(4.30-5.70)


 


Hemoglobin


 11.6 g/dL


(13.0-17.5) 11.5 g/dL


(13.0-17.5)


 


Hematocrit


 34.0 %


(39.0-53.0) 33.5 %


(39.0-53.0)


 


Mean Corpuscular Volume 95 fL ()  95 fL () 


 


Mean Corpuscular Hemoglobin 32 pg (25-35)  33 pg (25-35) 


 


Mean Corpuscular Hemoglobin


Concent 34 g/dL


(31-37) 34 g/dL


(31-37)


 


Red Cell Distribution Width


 13.9 %


(11.5-14.5) 14.0 %


(11.5-14.5)


 


Platelet Count


 136 x10^3/uL


(140-400) 179 x10^3/uL


(140-400)


 


Neutrophils (%) (Auto) 63 % (31-73)  75 % (31-73) 


 


Lymphocytes (%) (Auto) 19 % (24-48)  13 % (24-48) 


 


Monocytes (%) (Auto) 16 % (0-9)  11 % (0-9) 


 


Eosinophils (%) (Auto) 2 % (0-3)  1 % (0-3) 


 


Basophils (%) (Auto) 0 % (0-3)  0 % (0-3) 


 


Neutrophils # (Auto)


 3.0 x10^3/uL


(1.8-7.7) 6.8 x10^3/uL


(1.8-7.7)


 


Lymphocytes # (Auto)


 0.9 x10^3/uL


(1.0-4.8) 1.2 x10^3/uL


(1.0-4.8)


 


Monocytes # (Auto)


 0.8 x10^3/uL


(0.0-1.1) 1.0 x10^3/uL


(0.0-1.1)


 


Eosinophils # (Auto)


 0.1 x10^3/uL


(0.0-0.7) 0.0 x10^3/uL


(0.0-0.7)


 


Basophils # (Auto)


 0.0 x10^3/uL


(0.0-0.2) 0.0 x10^3/uL


(0.0-0.2)


 


Sodium Level


 142 mmol/L


(136-145) 142 mmol/L


(136-145)


 


Potassium Level


 2.6 mmol/L


(3.5-5.1) 3.4 mmol/L


(3.5-5.1)


 


Chloride Level


 105 mmol/L


() 106 mmol/L


()


 


Carbon Dioxide Level


 28 mmol/L


(21-32) 24 mmol/L


(21-32)


 


Anion Gap 9 (6-14)  12 (6-14) 


 


Blood Urea Nitrogen


 5 mg/dL (8-26) 


 12 mg/dL


(8-26)


 


Creatinine


 0.6 mg/dL


(0.7-1.3) 0.8 mg/dL


(0.7-1.3)


 


Estimated GFR


(Cockcroft-Gault) 168.0 


 120.6 





 


Glucose Level


 127 mg/dL


(70-99) 91 mg/dL


(70-99)


 


Calcium Level


 8.7 mg/dL


(8.5-10.1) 8.9 mg/dL


(8.5-10.1)








Laboratory Tests








Test


 6/21/21


06:30


 


White Blood Count


 9.1 x10^3/uL


(4.0-11.0)


 


Red Blood Count


 3.53 x10^6/uL


(4.30-5.70)


 


Hemoglobin


 11.5 g/dL


(13.0-17.5)


 


Hematocrit


 33.5 %


(39.0-53.0)


 


Mean Corpuscular Volume 95 fL () 


 


Mean Corpuscular Hemoglobin 33 pg (25-35) 


 


Mean Corpuscular Hemoglobin


Concent 34 g/dL


(31-37)


 


Red Cell Distribution Width


 14.0 %


(11.5-14.5)


 


Platelet Count


 179 x10^3/uL


(140-400)


 


Neutrophils (%) (Auto) 75 % (31-73) 


 


Lymphocytes (%) (Auto) 13 % (24-48) 


 


Monocytes (%) (Auto) 11 % (0-9) 


 


Eosinophils (%) (Auto) 1 % (0-3) 


 


Basophils (%) (Auto) 0 % (0-3) 


 


Neutrophils # (Auto)


 6.8 x10^3/uL


(1.8-7.7)


 


Lymphocytes # (Auto)


 1.2 x10^3/uL


(1.0-4.8)


 


Monocytes # (Auto)


 1.0 x10^3/uL


(0.0-1.1)


 


Eosinophils # (Auto)


 0.0 x10^3/uL


(0.0-0.7)


 


Basophils # (Auto)


 0.0 x10^3/uL


(0.0-0.2)


 


Sodium Level


 142 mmol/L


(136-145)


 


Potassium Level


 3.4 mmol/L


(3.5-5.1)


 


Chloride Level


 106 mmol/L


()


 


Carbon Dioxide Level


 24 mmol/L


(21-32)


 


Anion Gap 12 (6-14) 


 


Blood Urea Nitrogen


 12 mg/dL


(8-26)


 


Creatinine


 0.8 mg/dL


(0.7-1.3)


 


Estimated GFR


(Cockcroft-Gault) 120.6 





 


Glucose Level


 91 mg/dL


(70-99)


 


Calcium Level


 8.9 mg/dL


(8.5-10.1)








Brief Hospital Course


Mr Amador is a 55-year-old male past medical history hypertension, who presents 

to the ED with complaints of lip swelling that began a few days after hospital 

discharge. Patient was recently discharged from our service and during that time

he had echocardiogram that showed systolic function is mildly to moderately 

impaired, with Ejection Fraction is 35-40%; there is global hypokinesis of the 

left ventricle; septal motion suggestive of conduction defect.  He was initiated

on lisinopril, Toprol-XL, and amlodipine. Reportedly took his prescribed 

lisinopril and woke up from a nap at the end of the day with noted swelling to 

his lips and left side of his face.  He initially denied any history of similar 

symptoms to the ED attending, but when family was present she did note he has a 

history of similar reaction to his face and lips about 2-3 years ago.  This 

reaction responded with medications and they never did figure out what the cause

of this reaction was at that time.  Denies any family history of angioedema.  

Treated with Solu-Medrol and some epinephrine in the ER.  


Admitted for further care


6/9: Due to concerns of swelling to tongue and compromised airway, anesthesia 

was consulted to help perform tracheostomy.  Patient on vent, FiO2 40%, PEEP 5.


6/10: Afebrile, no acute overnight.  Remains on vent with FiO2 40%.  Plan for 

vacation sedation tomorrow morning.


6/11: No acute events overnight.  On trach collar with FiO2 35%, PEEP 5.  

Afebrile.  Sedation vacation planned today.


6/14: No acute events overnight.  Continues to be on trach ventilation.  

Tolerating well without any complications.  No concerns from nursing.  Plan for 

possible bronchoscopy


6/15: No acute events overnight.  Patient can to be on trach shield.  Saturating

100% on 8 L flow rate.  Currently sedated on Precedex.  No major concerns from 

nursing.


6/16: No acute events overnight.  Patient seen and examined bedside.  Tolerating

trach collar.  Started on Zyvox yesterday.  Continue with both Zyvox and Zosyn 

per ID. 


6/17: No acute events overnight.  Patient seen and examined bedside.  Pending 

bronchoscopy today.  No concerns from nursing.  Tolerating trach collar at this 

time


6/18: Tracheostomy decannulated. Eating, vocalizing well.


6/19: No acute events overnight.  Patient seen and examined bedside.  Trach has 

been decannulated.  Patient tolerated bronchoscopy well with some suctioning of 

mucus. 


6/20: No acute events overnight.  Patient overnight did actually have some 

agitation and hallucinations.  Please see nurses note for details.  We will try 

to adjust Haldol to 3 mg





Off oxygen.  Afebrile. Trach site healing well.  His agitation improved 

significantly with 10 mg of Zyprexa and he and his wife feel like this would be 

helpful at home and will go home on Augmentin and Zyvox for 1 week with 

outpatient follow-up with pulmonology.





Acute respiratory failure, status post emergent tracheostomy on 06/09/2021.


Angioedema - ACE inhibitor likely


Systolic CHF - imdur + hydralazine, toprol XL, ASA, statin. CANNOT TAKE ACE or 

ARB due to life-threatening angioedema reaction


Hypertension


Abormal chest radiograph - Chronic cavitation, right upper lobe lung lesion with

LLL infiltrates


History of Mycobacterium tuberculosis, treated in 2008.  AFB negative in  2017 

and 2019. HIV negative 2017


History of Aspergillus fumigatus on bronchoscopy 2017 - Aspergillus antigen 0.46


Leukocytosis - reactive to steroids.- better


Chronic obstructive pulmonary disease.


Tobaccoism and substance abuse.


Alcohol abuse


Thrombocytopenia


Severe protein calorie malnutrition


Anemia





Consults: Pulm, Neuro, General surgery, ID


Plan:


Antibiotics 1 week


Prednisone taper


Withhold all ACE inhibitors indefinitely


Follow up with:  Dr. Otoole (Lung doctor) in September 2021. Call office at 

[phone]


Follow Up With:  Primary Care Physician in 1 week


Cardiology follow-up [phone] - call to schedule in the next 30 days with 

Dr. Arias





Greater than 30 minutes spent on d/c home with home health





Discharge Information


Condition at Discharge:  Improved


Follow Up:  Weeks (1)


Disposition/Orders:  D/C to Home w/ HH


Scheduled


Amoxicillin/Potassium Clav (Augmentin 875-125 Tablet) 1 Each Tablet, 1 TAB PO 

BID for Pneumonia for 7 Days, #14


   Prescribed by: ISATU ROACH MD on 6/21/21 1618


Atorvastatin Calcium (Atorvastatin Calcium) 10 Mg Tablet, 1 TAB PO DAILY for CHF

for 30 Days, #30 Ref 5


   Prescribed by: ISATU ROACH MD on 6/21/21 1611


Gabapentin (Gabapentin) 300 Mg Capsule, 300 MG PO TID for Neuropathy/ETOH 

Cessation for 30 Days, #90 Ref 3


   Prescribed by: ISATU ROACH MD on 6/21/21 1611


Hydralazine Hcl (Hydralazine Hcl) 10 Mg Tablet, 1 TAB PO TID for HTN/CHF for 30 

Days, #90 Ref 5


   Prescribed by: ISATU ROACH MD on 6/21/21 1611


Isosorbide Mononitrate (Isosorbide Mononitrate Er) 30 Mg Tab.er.24h, 1 TAB PO 

DAILY for CHF/HTN for 30 Days, #30 Ref 5


   Prescribed by: ISATU ROACH MD on 6/21/21 1611


Linezolid (Zyvox) 600 Mg Tablet, 600 MG PO BID for Pneumonia for 7 Days, #14 Ref

0


   Prescribed by: ISATU ROACH MD on 6/21/21 1618


Metoprolol Succinate (Metoprolol Succinate ( Xl )) 25 Mg Tab.er.24h, 25 MG PO 

DAILY for FOR HYPERTENSION for 30 Days, #30 Ref 5


   Prescribed by: ISATU ROACH MD on 6/21/21 1611


Olanzapine (Olanzapine Odt) 10 Mg Tab.rapdis, 10 MG PO QHS for Mood 

Stabilization for 30 Days, #30 Ref 5


   Prescribed by: ISATU ROACH MD on 6/21/21 1818


Prednisone (Prednisone) 20 Mg Tablet, 1 TAB PO DAILY for Bronchitis for 5 Days, 

#5 Ref 0


   Prescribed by: ISATU ROACH MD on 6/21/21 1611





Scheduled PRN


Albuterol Sulfate (Ventolin Hfa Inhaler) 18 Gm Hfa.aer.ad, 2 PUFF INH PRN Q4HRS 

PRN for SHORTNESS OF BREATH for 30 Days, #1 Ref 3


   Prescribed by: ISATU ROACH MD on 6/21/21 1611





Discontinued Medications


Amlodipine Besylate (Amlodipine Besylate) 10 Mg Tablet, 10 MG PO DAILY for 30 

Days, #30 Ref 5


   Prescribed by: TROY HAMMER on 9/29/17 1328


   Last Action: Reviewed on 6/8/21 1705 by SWEETIE LIANG RN


Hydrochlorothiazide (Hydrochlorothiazide Tablet) 12.5 Mg Tablet, 12.5 MG PO 

DAILY for DIURETIC, Ref 0 (Reported)


   Entered as Reported by: SWEETIE LIANG RN on 6/8/21 1705


   Last Taken: Unknown Dose on 6/8/21      Last Action: New Order on 6/8/21 1705

by SWEETIE LIANG RN


Metoprolol Succinate (Metoprolol Succinate ( Xl )) 25 Mg Tab.er.24h, 25 MG PO 

DAILY for FOR HYPERTENSION, #30 Ref 0 (Reported)


   Discontinued Reason: Prescription changed


   Entered as Reported by: SWEETIE LIANG RN on 6/8/21 1705


   Last Taken: Unknown Dose on 6/8/21      Last Action: Continued on 6/21/21 

0734 by ISATU ROACH MD


Olanzapine (Olanzapine Odt) 5 Mg Tab.rapdis, 5 MG PO DAILY for Anxiety/Sleep for

30 Days, #60 Ref 5


   Discontinued Reason: Prescription changed


   Prescribed by: ISATU ROACH MD on 6/21/21 1611





Justicifation of Admission Dx:


Justifications for Admission:


Justification of Admission Dx:  Yes











ISATU ROACH MD        Jun 21, 2021 20:58

## 2021-06-21 NOTE — PDOC
PULMONARY PROGRESS NOTES


DATE: 6/21/21 


TIME: 10:44


Subjective


PT. remains on room air 


doing much better today 


no overnight concerns


Vitals





Vital Signs








  Date Time  Temp Pulse Resp B/P (MAP) Pulse Ox O2 Delivery O2 Flow Rate FiO2


 


6/21/21 09:06  81  181/109    


 


6/21/21 08:12     97 Room Air  


 


6/21/21 07:00   18     


 


6/21/21 04:00 98.4       





 98.4       








ROS:  No Nausea, No Chest Pain, No Abdominal Pain, No Increase Cough


General:  Alert, Oriented X4, No acute distress


HEENT:  Other (nc  at )


Lungs:  Clear


Cardiovascular:  S1, S2


Abdomen:  Soft, Non-tender


Extremities:  No Edema


Skin:  Warm


Labs





Laboratory Tests








Test


 6/20/21


02:20 6/21/21


06:30


 


White Blood Count


 4.8 x10^3/uL


(4.0-11.0) 9.1 x10^3/uL


(4.0-11.0)


 


Red Blood Count


 3.60 x10^6/uL


(4.30-5.70) 3.53 x10^6/uL


(4.30-5.70)


 


Hemoglobin


 11.6 g/dL


(13.0-17.5) 11.5 g/dL


(13.0-17.5)


 


Hematocrit


 34.0 %


(39.0-53.0) 33.5 %


(39.0-53.0)


 


Mean Corpuscular Volume 95 fL ()  95 fL () 


 


Mean Corpuscular Hemoglobin 32 pg (25-35)  33 pg (25-35) 


 


Mean Corpuscular Hemoglobin


Concent 34 g/dL


(31-37) 34 g/dL


(31-37)


 


Red Cell Distribution Width


 13.9 %


(11.5-14.5) 14.0 %


(11.5-14.5)


 


Platelet Count


 136 x10^3/uL


(140-400) 179 x10^3/uL


(140-400)


 


Neutrophils (%) (Auto) 63 % (31-73)  75 % (31-73) 


 


Lymphocytes (%) (Auto) 19 % (24-48)  13 % (24-48) 


 


Monocytes (%) (Auto) 16 % (0-9)  11 % (0-9) 


 


Eosinophils (%) (Auto) 2 % (0-3)  1 % (0-3) 


 


Basophils (%) (Auto) 0 % (0-3)  0 % (0-3) 


 


Neutrophils # (Auto)


 3.0 x10^3/uL


(1.8-7.7) 6.8 x10^3/uL


(1.8-7.7)


 


Lymphocytes # (Auto)


 0.9 x10^3/uL


(1.0-4.8) 1.2 x10^3/uL


(1.0-4.8)


 


Monocytes # (Auto)


 0.8 x10^3/uL


(0.0-1.1) 1.0 x10^3/uL


(0.0-1.1)


 


Eosinophils # (Auto)


 0.1 x10^3/uL


(0.0-0.7) 0.0 x10^3/uL


(0.0-0.7)


 


Basophils # (Auto)


 0.0 x10^3/uL


(0.0-0.2) 0.0 x10^3/uL


(0.0-0.2)


 


Sodium Level


 142 mmol/L


(136-145) 142 mmol/L


(136-145)


 


Potassium Level


 2.6 mmol/L


(3.5-5.1) 3.4 mmol/L


(3.5-5.1)


 


Chloride Level


 105 mmol/L


() 106 mmol/L


()


 


Carbon Dioxide Level


 28 mmol/L


(21-32) 24 mmol/L


(21-32)


 


Anion Gap 9 (6-14)  12 (6-14) 


 


Blood Urea Nitrogen


 5 mg/dL (8-26) 


 12 mg/dL


(8-26)


 


Creatinine


 0.6 mg/dL


(0.7-1.3) 0.8 mg/dL


(0.7-1.3)


 


Estimated GFR


(Cockcroft-Gault) 168.0 


 120.6 





 


Glucose Level


 127 mg/dL


(70-99) 91 mg/dL


(70-99)


 


Calcium Level


 8.7 mg/dL


(8.5-10.1) 8.9 mg/dL


(8.5-10.1)








Laboratory Tests








Test


 6/21/21


06:30


 


White Blood Count


 9.1 x10^3/uL


(4.0-11.0)


 


Red Blood Count


 3.53 x10^6/uL


(4.30-5.70)


 


Hemoglobin


 11.5 g/dL


(13.0-17.5)


 


Hematocrit


 33.5 %


(39.0-53.0)


 


Mean Corpuscular Volume 95 fL () 


 


Mean Corpuscular Hemoglobin 33 pg (25-35) 


 


Mean Corpuscular Hemoglobin


Concent 34 g/dL


(31-37)


 


Red Cell Distribution Width


 14.0 %


(11.5-14.5)


 


Platelet Count


 179 x10^3/uL


(140-400)


 


Neutrophils (%) (Auto) 75 % (31-73) 


 


Lymphocytes (%) (Auto) 13 % (24-48) 


 


Monocytes (%) (Auto) 11 % (0-9) 


 


Eosinophils (%) (Auto) 1 % (0-3) 


 


Basophils (%) (Auto) 0 % (0-3) 


 


Neutrophils # (Auto)


 6.8 x10^3/uL


(1.8-7.7)


 


Lymphocytes # (Auto)


 1.2 x10^3/uL


(1.0-4.8)


 


Monocytes # (Auto)


 1.0 x10^3/uL


(0.0-1.1)


 


Eosinophils # (Auto)


 0.0 x10^3/uL


(0.0-0.7)


 


Basophils # (Auto)


 0.0 x10^3/uL


(0.0-0.2)


 


Sodium Level


 142 mmol/L


(136-145)


 


Potassium Level


 3.4 mmol/L


(3.5-5.1)


 


Chloride Level


 106 mmol/L


()


 


Carbon Dioxide Level


 24 mmol/L


(21-32)


 


Anion Gap 12 (6-14) 


 


Blood Urea Nitrogen


 12 mg/dL


(8-26)


 


Creatinine


 0.8 mg/dL


(0.7-1.3)


 


Estimated GFR


(Cockcroft-Gault) 120.6 





 


Glucose Level


 91 mg/dL


(70-99)


 


Calcium Level


 8.9 mg/dL


(8.5-10.1)








Medications





Active Scripts








 Medications  Dose


 Route/Sig


 Max Daily Dose Days Date Category


 


 Metoprolol


 Succinate ( Xl )


  (Metoprolol


 Succinate) 25 Mg


 Tab.er.24h  25 Mg


 PO DAILY


   6/8/21 Reported


 


 Hydrochlorothiazide


 Tablet


  (Hydrochlorothiazide)


 12.5 Mg Tablet  12.5 Mg


 PO DAILY


   6/8/21 Reported


 


 Ventolin Hfa


 Inhaler


  (Albuterol


 Sulfate) 18 Gm


 Hfa.aer.ad  2 Puff


 INH PRN Q4HRS PRN


   8/15/19 Reported


 


 Amlodipine


 Besylate 10 Mg


 Tablet  10 Mg


 PO DAILY


  30 9/29/17 Rx








Comments


reviewd  CT chest 


  


1. New left lower lobe dependent consolidation, which may represent infection or

aspiration.





2. Stable right apical cavitation with intracavitary lesion which could 

represent aspergilloma. Additional right apical spiculated 2.4 cm nodule is 

stable from recent exams, but decreased in size from 2013.





CXR 6/18/21


Impression: 


1.  Increased right basilar consolidations and pleural effusion.





Impression


.


ASSESSMENT:


1.  Acute respiratory failure secondary to angioedema from lisinopril.--resolved




2.  ACE inhibitor induced angioedema--improving, now S/P trach, decannulated on 

6/18/21


3.  Hypertension.


4.  Chronic obstructive pulmonary disease.


5.  Abnormal chest x-ray/ct of chest





Plan


.


UPDATED 6/21/21


Respiratory status compensated on room air --decannulation on 6/18/21


Follow CXR/ct--reviewed   PT. will need Follow CT in 6 weeks -- PT. has F/U in 

office in September


Bronchoscopy on 6/17/21-- GPC's, cultures NRF and Bronch AFB pending


BD NEBS


Follow ID recs for ABX, last AFB in 2019 and Aspergillosis in 2017-- DC on zyvox

and augmentin


Continue steroids with slow taper 


Physical therapy/Occupational Therapy/Speech-- reg. diet  with thin  


Social work for DC planning-- home with home health  


DVT/GI PPX: Lovenox


D/W RN 





Updated 6/20/2021


titrate fi02 to keep sats above 92%, on RA


elevate hob 


aspiration fall precaution 


Follow CXR/ct--reviewed   need fu ct in 6 weeks 


Bronchoscopy on 6/17/21-- follow BAL --NGTD 


Follow surgery recs-- decannulation on 6/18/21  


BD NEBS


Follow ID recs for ABX, last AFB in 2019 and Aspergillosis in 2017, fu repeat 

cultures


Continue solumedrol 40 mg daily


Physical therapy/Occupational Therapy/Speech-- reg. diet  with thin   


DVT/GI PPX: Lovenox


D/W RN and RT





Updated 6/17/2021


Continue supplemental oxygen to keep sats above 92%, on t-shield 


avoid sedation 


Follow CXR/PRN 


PRN suctioning 


S/P bronchoscopy -- see procedure note 


Follow surgery recs-- ongoing trach, plan for decannulation in next 24-48hours  


NEBS


Follow ID recs for ABX, last AFB in 2019 and Aspergillosis in 2017,repeat 

cultures pending 


Continue steroids IV 


Physical therapy/Occupational Therapy


Continue PPN for nutritional support  


DVT/GI PPX: Lovenox


D/W RN and RT








Updated 6/16/2021


Continue supplemental oxygen to keep sats above 92%, 


Follow CXR/PRN 


PRN suctioning 


Plan for bronchoscopy in am 


Follow surgery recs-- ongoing trach 


CIWA for ETOH withdraw,precdex gtt


NEBS


Follow ID recs for ABX, last AFB in 2019 and Aspergillosis in 2017,repeat 

cultures pending 


Continue steroids IV 


Physical therapy/Occupational Therapy


Continue PPN for nutritional support  


DVT/GI PPX: Lovenox


D/KISHAN RN and RT











NADIA ANNE MD              Jun 21, 2021 10:44

## 2021-06-21 NOTE — PDOC
[facility] PROGRESS NOTE


Date of Service


DOS:


DATE: 6/21/21 


TIME: 07:25





Chief Complaint


Chief Complaint


A/P:


Acute respiratory failure, status post emergent tracheostomy on 06/09/2021.


Angioedema


Systolic CHF


Hypertension


Abormal chest radiograph - Chronic cavitation, right upper lobe lung lesion with

LLL infiltrates


History of Mycobacterium tuberculosis, treated in 2008.  AFB negative in  2017 

and 2019. HIV negative 2017


History of Aspergillus fumigatus on bronchoscopy 2017 - Aspergillus antigen 0.46


Leukocytosis - reactive to steroids.- better


Chronic obstructive pulmonary disease.


Tobaccoism and substance abuse.


Alcohol abuse


Thrombocytopenia


Severe protein calorie malnutrition


Anemia





Consults: Pulm, Neuro, General surgery, ID


Plan:


Team empiric IV antibiotics


Continue Solu-Medrol 40 mg IV daily


Withhold all ACE inhibitors indefinitely





FEN - Cardiac diet as tolerated


PPX  - Lovenox


FULL CODE


Dispo -continue ICU care





History of Present Illness


History of Present Illness


Mr Amador is a 55-year-old male past medical history hypertension, who presents 

to the ED with complaints of lip swelling that began a few days after hospital 

discharge. Patient was recently discharged from our service and during that time

he had echocardiogram that showed systolic function is mildly to moderately 

impaired, with Ejection Fraction is 35-40%; there is global hypokinesis of the 

left ventricle; septal motion suggestive of conduction defect.  He was initiated

on lisinopril, Toprol-XL, and amlodipine. Reportedly took his prescribed 

lisinopril and woke up from a nap at the end of the day with noted swelling to 

his lips and left side of his face.  He initially denied any history of similar 

symptoms to the ED attending, but when family was present she did note he has a 

history of similar reaction to his face and lips about 2-3 years ago.  This 

reaction responded with medications and they never did figure out what the cause

of this reaction was at that time.  Denies any family history of angioedema.  

Treated with Solu-Medrol and some epinephrine in the ER.  


Admitted for further care


6/9: Due to concerns of swelling to tongue and compromised airway, anesthesia 

was consulted to help perform tracheostomy.  Patient on vent, FiO2 40%, PEEP 5.


6/10: Afebrile, no acute overnight.  Remains on vent with FiO2 40%.  Plan for 

vacation sedation tomorrow morning.


6/11: No acute events overnight.  On trach collar with FiO2 35%, PEEP 5.  Afe

brile.  Sedation vacation planned today.


6/14: No acute events overnight.  Continues to be on trach ventilation.  

Tolerating well without any complications.  No concerns from nursing.  Plan for 

possible bronchoscopy


6/15: No acute events overnight.  Patient can to be on trach shield.  Saturating

100% on 8 L flow rate.  Currently sedated on Precedex.  No major concerns from 

nursing.


6/16: No acute events overnight.  Patient seen and examined bedside.  Tolerating

trach collar.  Started on Zyvox yesterday.  Continue with both Zyvox and Zosyn 

per ID. 


6/17: No acute events overnight.  Patient seen and examined bedside.  Pending 

bronchoscopy today.  No concerns from nursing.  Tolerating trach collar at this 

time


6/18: Tracheostomy decannulated. Eating, vocalizing well.


6/19: No acute events overnight.  Patient seen and examined bedside.  Trach has 

been decannulated.  Patient tolerated bronchoscopy well with some suctioning of 

mucus. 


6/20: No acute events overnight.  Patient overnight did actually have some 

agitation and hallucinations.  Please see nurses note for details.  We will try 

to adjust Haldol to 3 mg





Off oxygen.  Afebrile. Trach site healing well.  His agitation improved 

significantly with 10 mg of Zyprexa and he and his wife feel like this would be 

helpful at home and will go home on Augmentin and Zyvox for 1 week with 

outpatient follow-up with pulmonology.





Vitals/I&O


Vitals/I&O:





                                   Vital Signs








  Date Time  Temp Pulse Resp B/P (MAP) Pulse Ox O2 Delivery O2 Flow Rate FiO2


 


6/21/21 06:00  90 18 158/92 (114) 98 Room Air  


 


6/21/21 04:00 98.4       





 98.4       














                                    I & O   


 


 6/20/21 6/20/21 6/21/21





 15:00 23:00 07:00


 


Intake Total 500 ml 300 ml 480.09 ml


 


Output Total 440 ml 550 ml 455 ml


 


Balance 60 ml -250 ml 25.09 ml











Physical Exam


Physical Exam:


GENERAL: Sedated, comfortable


HEENT:  Pupils small.  Normal conjunctivae.  Oral cavity pink.


NECK:  Tracheostomy removed


LUNGS:  scattered Rhonchi.  No accessory muscle use.


HEART:  S1 and S2, regular.


ABDOMEN:  Nondistended, soft.  No grimace or guarding to palpation.


GENITOURINARY:  Indwelling Ramirez in place.


EXTREMITIES:  No gross edema or cyanosis. Mitts


CNS sedated


SKIN:  Warm to touch.  No signs of rash.  Peripheral IVs look okay.


General:  Alert, Oriented X3, Cooperative, No acute distress


Heart:  Regular rate


Lungs:  Clear


Abdomen:  Normal bowel sounds, Soft


Extremities:  No clubbing, No cyanosis


Skin:  No rashes, No breakdown, Other (Tracheostomy in place)





Labs


Labs:





Laboratory Tests








Test


 6/21/21


06:30


 


White Blood Count


 9.1 x10^3/uL


(4.0-11.0)


 


Red Blood Count


 3.53 x10^6/uL


(4.30-5.70)


 


Hemoglobin


 11.5 g/dL


(13.0-17.5)


 


Hematocrit


 33.5 %


(39.0-53.0)


 


Mean Corpuscular Volume 95 fL () 


 


Mean Corpuscular Hemoglobin 33 pg (25-35) 


 


Mean Corpuscular Hemoglobin


Concent 34 g/dL


(31-37)


 


Red Cell Distribution Width


 14.0 %


(11.5-14.5)


 


Platelet Count


 179 x10^3/uL


(140-400)


 


Neutrophils (%) (Auto) 75 % (31-73) 


 


Lymphocytes (%) (Auto) 13 % (24-48) 


 


Monocytes (%) (Auto) 11 % (0-9) 


 


Eosinophils (%) (Auto) 1 % (0-3) 


 


Basophils (%) (Auto) 0 % (0-3) 


 


Neutrophils # (Auto)


 6.8 x10^3/uL


(1.8-7.7)


 


Lymphocytes # (Auto)


 1.2 x10^3/uL


(1.0-4.8)


 


Monocytes # (Auto)


 1.0 x10^3/uL


(0.0-1.1)


 


Eosinophils # (Auto)


 0.0 x10^3/uL


(0.0-0.7)


 


Basophils # (Auto)


 0.0 x10^3/uL


(0.0-0.2)


 


Sodium Level


 142 mmol/L


(136-145)


 


Potassium Level


 3.4 mmol/L


(3.5-5.1)


 


Chloride Level


 106 mmol/L


()


 


Carbon Dioxide Level


 24 mmol/L


(21-32)


 


Anion Gap 12 (6-14) 


 


Blood Urea Nitrogen


 12 mg/dL


(8-26)


 


Creatinine


 0.8 mg/dL


(0.7-1.3)


 


Estimated GFR


(Cockcroft-Gault) 120.6 





 


Glucose Level


 91 mg/dL


(70-99)


 


Calcium Level


 8.9 mg/dL


(8.5-10.1)











Assessment and Plan


Assessmemt and Plan


Problems


Medical Problems:


(1) ACE inhibitor-aggravated angioedema


Status: Acute  











Comment


Review of Relevant


I have reviewed the following items elizabeth (where applicable) has been applied.


Medications:





Current Medications








 Medications


  (Trade)  Dose


 Ordered  Sig/Yg


 Route


 PRN Reason  Start Time


 Stop Time Status Last Admin


Dose Admin


 


 Haloperidol


 Lactate


  (Haldol Inj)  3 mg  PRN Q6HRS  PRN


 IVP


 AGITATION- 1st CHOICE  6/20/21 10:15


    6/20/21 12:37





 


 Olanzapine


  (ZyPREXA IM)  10 mg  HS


 IM


   6/20/21 21:00


    6/20/21 21:06














Justifications for Admission


Other Justification


Angioedema











ISATU ROACH MD        Jun 21, 2021 07:34

## 2021-06-21 NOTE — SNU/HH DC
DISCHARGE WITH HOME HEALTH


DISCHARGE INFORMATION:


Discharge Date:  Jun 21, 2021


Final Diagnosis:


Problems


Medical Problems:


(1) ACE inhibitor-aggravated angioedema


Status: Acute  








Condition on Discharge:  Stable





CODE STATUS:


Code Status:  Full





HOME HEALTH:


Face to Face:


I certify this patient is under my care and that I, or a nurse practitioner or 

physician's assistant working with me, had a face to face encounter that meets 

the physician face to face encounter requirements with this patient on 

6/21/2021.


Medical Complications:  CHF, HTN


RN For Eval/Treatment:  Yes


Physical Therapy For:  Evalulation/Treatment


Occupational Therapy For:  Evaluation/Treatment


Pt Meets Homebound Status:  Fatigue w/ amb.





POST DISCHARGE ORDERS:


Activity Instructions for Disc:  Activity as tolerated


Weight Bearing Status after Di:  Full weight bearing


DIET AFTER DISCHARGE:  Cardiac


Wound/Incision Care:  No wound care needed





CHECKS AFTER DISCHARGE:


Checks after discharge:  Check blood press - daily





FOLLOW-UP:


Follow up with:  Dr. Otoole (Lung doctor) in September 2021. Call office at 

[phone]


Follow Up With:  Primary Care Physician in 1 week


Additional Instructions:


Cardiology follow-up [phone] - call to schedule in the next 30 days with 

Dr. Arias





TREATMENT/EQUIPMENT ORDERS:


Adaptive Equipment Issued:  None





CERTIFICATION STATEMENT:


Certification Statement:


Certification Statement: Based on the above finding, I certify that this patient

is confined to the home and needs intermittent skilled nursing care, physical 

therapy and/or speech therapy, or continues to need occupational therapy.~ This 

patient is under my care, and I have initiated the establishment of the plan of 

care.~ This patient will be followed by myself or a community physician who will

periodically review the plan of care.


Home Meds


Active Scripts


Linezolid (ZYVOX) 600 Mg Tablet, 600 MG PO BID for Pneumonia for 7 Days, #14 TAB

0 Refills


   Prov:ISATU ROACH MD         6/21/21


Amoxicillin/Potassium Clav (AUGMENTIN 875-125 TABLET) 1 Each Tablet, 1 TAB PO 

BID for Pneumonia for 7 Days, #14 TAB


   Prov:ISATU ROACH MD         6/21/21


Olanzapine (OLANZAPINE ODT) 5 Mg Tab.rapdis, 5 MG PO DAILY for Anxiety/Sleep for

30 Days, #60 TAB 5 Refills


   Prov:ISATU ROACH MD         6/21/21


Atorvastatin Calcium (ATORVASTATIN CALCIUM) 10 Mg Tablet, 1 TAB PO DAILY for CHF

for 30 Days, #30 TAB 5 Refills


   Prov:ISATU ROACH MD         6/21/21


Hydralazine Hcl (HYDRALAZINE HCL) 10 Mg Tablet, 1 TAB PO TID for HTN/CHF for 30 

Days, #90 TAB 5 Refills


   Prov:ISATU ROACH MD         6/21/21


Isosorbide Mononitrate (ISOSORBIDE MONONITRATE ER) 30 Mg Tab.er.24h, 1 TAB PO 

DAILY for CHF/HTN for 30 Days, #30 TAB 5 Refills


   Prov:ISATU ROACH MD         6/21/21


Prednisone (PREDNISONE) 20 Mg Tablet, 1 TAB PO DAILY for Bronchitis for 5 Days, 

#5 TAB 0 Refills


   Prov:ISATU ROACH MD         6/21/21


Gabapentin (GABAPENTIN) 300 Mg Capsule, 300 MG PO TID for Neuropathy/ETOH 

Cessation for 30 Days, #90 CAP 3 Refills


   Prov:ISATU ROACH MD         6/21/21


Metoprolol Succinate (METOPROLOL SUCCINATE ( XL )) 25 Mg Tab.er.24h, 25 MG PO 

DAILY for FOR HYPERTENSION for 30 Days, #30 TAB 5 Refills


   Prov:ISATU ROACH MD         6/21/21


Albuterol Sulfate (VENTOLIN HFA INHALER) 18 Gm Hfa.aer.ad, 2 PUFF INH PRN Q4HRS 

PRN for SHORTNESS OF BREATH for 30 Days, #1 INHALER 3 Refills


   Prov:ISATU ROACH MD         6/21/21


Discontinued Reported Medications


Metoprolol Succinate (METOPROLOL SUCCINATE ( XL )) 25 Mg Tab.er.24h, 25 MG PO 

DAILY for FOR HYPERTENSION, #30 TAB 0 Refills


   6/8/21


Hydrochlorothiazide (HYDROCHLOROTHIAZIDE TABLET) 12.5 Mg Tablet, 12.5 MG PO 

DAILY for DIURETIC, TAB 0 Refills


   6/8/21


Discontinued Scripts


Amlodipine Besylate (AMLODIPINE BESYLATE) 10 Mg Tablet, 10 MG PO DAILY for 30 

Days, #30 TAB 5 Refills


   Prov:TROY HAMMER MD         9/29/17











ISATU ROACH MD        Jun 21, 2021 16:14

## 2021-06-21 NOTE — PDOC
PROGRESS NOTES


Date of Service


DATE: 6/21/21 


TIME: 09:06


Assessment


Problems


Medical Problems:


(1) ACE inhibitor-aggravated angioedema


Status: Acute  





Brief alcohol-related seizure, he is back to his baseline now.  He is still a 

little tremulous


Alcoholism


Recent angioedema status-post tracheostomy


Congestive heart failure, hypertension, right upper lobe lesion, history of 

tuberculosis, Aspergillus fumigatus, leukocytosis, chronic obstructive pulmonary

disease, tobaccoism, substance abuse, alcoholism, thrombocytopenia, severe prote

in calorie malnutrition, anemia


Plan


Holding off on EEG and CT studies


Continue to monitor


Subjective


No complaints


Objective





Vital Signs








  Date Time  Temp Pulse Resp B/P (MAP) Pulse Ox O2 Delivery O2 Flow Rate FiO2


 


6/21/21 08:12     97 Room Air  


 


6/21/21 07:00  81 18 181/109 (133)    


 


6/21/21 04:00 98.4       





 98.4       














Intake and Output 


 


 6/21/21





 07:00


 


Intake Total 1280.09 ml


 


Output Total 1445 ml


 


Balance -164.91 ml


 


 


 


Intake Oral 300 ml


 


IV Total 980.09 ml


 


Output Urine Total 1445 ml








PHYSICAL EXAM


Alert. Oriented to time, place and person.


PERRL.


EOMI.


CN: no focal findings.


Muscle tone: normal.


Muscle strength: 4/5


DTR: 1+


Plantar reflex: flexor


Gait: not examined in bed.


Sensory exam: no abnormal findings.


No cerebellar signs elicited.


Slight tremulousness


Review of Relevant


I have reviewed the following items elizabeth (where applicable) has been applied.


Labs





Laboratory Tests








Test


 6/20/21


02:20 6/21/21


06:30


 


White Blood Count


 4.8 x10^3/uL


(4.0-11.0) 9.1 x10^3/uL


(4.0-11.0)


 


Red Blood Count


 3.60 x10^6/uL


(4.30-5.70) 3.53 x10^6/uL


(4.30-5.70)


 


Hemoglobin


 11.6 g/dL


(13.0-17.5) 11.5 g/dL


(13.0-17.5)


 


Hematocrit


 34.0 %


(39.0-53.0) 33.5 %


(39.0-53.0)


 


Mean Corpuscular Volume 95 fL ()  95 fL () 


 


Mean Corpuscular Hemoglobin 32 pg (25-35)  33 pg (25-35) 


 


Mean Corpuscular Hemoglobin


Concent 34 g/dL


(31-37) 34 g/dL


(31-37)


 


Red Cell Distribution Width


 13.9 %


(11.5-14.5) 14.0 %


(11.5-14.5)


 


Platelet Count


 136 x10^3/uL


(140-400) 179 x10^3/uL


(140-400)


 


Neutrophils (%) (Auto) 63 % (31-73)  75 % (31-73) 


 


Lymphocytes (%) (Auto) 19 % (24-48)  13 % (24-48) 


 


Monocytes (%) (Auto) 16 % (0-9)  11 % (0-9) 


 


Eosinophils (%) (Auto) 2 % (0-3)  1 % (0-3) 


 


Basophils (%) (Auto) 0 % (0-3)  0 % (0-3) 


 


Neutrophils # (Auto)


 3.0 x10^3/uL


(1.8-7.7) 6.8 x10^3/uL


(1.8-7.7)


 


Lymphocytes # (Auto)


 0.9 x10^3/uL


(1.0-4.8) 1.2 x10^3/uL


(1.0-4.8)


 


Monocytes # (Auto)


 0.8 x10^3/uL


(0.0-1.1) 1.0 x10^3/uL


(0.0-1.1)


 


Eosinophils # (Auto)


 0.1 x10^3/uL


(0.0-0.7) 0.0 x10^3/uL


(0.0-0.7)


 


Basophils # (Auto)


 0.0 x10^3/uL


(0.0-0.2) 0.0 x10^3/uL


(0.0-0.2)


 


Sodium Level


 142 mmol/L


(136-145) 142 mmol/L


(136-145)


 


Potassium Level


 2.6 mmol/L


(3.5-5.1) 3.4 mmol/L


(3.5-5.1)


 


Chloride Level


 105 mmol/L


() 106 mmol/L


()


 


Carbon Dioxide Level


 28 mmol/L


(21-32) 24 mmol/L


(21-32)


 


Anion Gap 9 (6-14)  12 (6-14) 


 


Blood Urea Nitrogen


 5 mg/dL (8-26) 


 12 mg/dL


(8-26)


 


Creatinine


 0.6 mg/dL


(0.7-1.3) 0.8 mg/dL


(0.7-1.3)


 


Estimated GFR


(Cockcroft-Gault) 168.0 


 120.6 





 


Glucose Level


 127 mg/dL


(70-99) 91 mg/dL


(70-99)


 


Calcium Level


 8.7 mg/dL


(8.5-10.1) 8.9 mg/dL


(8.5-10.1)








Laboratory Tests








Test


 6/21/21


06:30


 


White Blood Count


 9.1 x10^3/uL


(4.0-11.0)


 


Red Blood Count


 3.53 x10^6/uL


(4.30-5.70)


 


Hemoglobin


 11.5 g/dL


(13.0-17.5)


 


Hematocrit


 33.5 %


(39.0-53.0)


 


Mean Corpuscular Volume 95 fL () 


 


Mean Corpuscular Hemoglobin 33 pg (25-35) 


 


Mean Corpuscular Hemoglobin


Concent 34 g/dL


(31-37)


 


Red Cell Distribution Width


 14.0 %


(11.5-14.5)


 


Platelet Count


 179 x10^3/uL


(140-400)


 


Neutrophils (%) (Auto) 75 % (31-73) 


 


Lymphocytes (%) (Auto) 13 % (24-48) 


 


Monocytes (%) (Auto) 11 % (0-9) 


 


Eosinophils (%) (Auto) 1 % (0-3) 


 


Basophils (%) (Auto) 0 % (0-3) 


 


Neutrophils # (Auto)


 6.8 x10^3/uL


(1.8-7.7)


 


Lymphocytes # (Auto)


 1.2 x10^3/uL


(1.0-4.8)


 


Monocytes # (Auto)


 1.0 x10^3/uL


(0.0-1.1)


 


Eosinophils # (Auto)


 0.0 x10^3/uL


(0.0-0.7)


 


Basophils # (Auto)


 0.0 x10^3/uL


(0.0-0.2)


 


Sodium Level


 142 mmol/L


(136-145)


 


Potassium Level


 3.4 mmol/L


(3.5-5.1)


 


Chloride Level


 106 mmol/L


()


 


Carbon Dioxide Level


 24 mmol/L


(21-32)


 


Anion Gap 12 (6-14) 


 


Blood Urea Nitrogen


 12 mg/dL


(8-26)


 


Creatinine


 0.8 mg/dL


(0.7-1.3)


 


Estimated GFR


(Cockcroft-Gault) 120.6 





 


Glucose Level


 91 mg/dL


(70-99)


 


Calcium Level


 8.9 mg/dL


(8.5-10.1)








Microbiology


6/17/21 - Final, Complete


Medications





Current Medications


Epinephrine HCl (Adrenalin) 0.3 mg 1X  ONCE IM  Last administered on 6/8/21at 

14:30;  Start 6/8/21 at 14:15;  Stop 6/8/21 at 14:16;  Status DC


Methylprednisolone Sodium Succinate (SOLU-Medrol 125MG VIAL) 125 mg 1X  ONCE IV 

Last administered on 6/8/21at 14:31;  Start 6/8/21 at 14:15;  Stop 6/8/21 at 

14:16;  Status DC


Ondansetron HCl (Zofran) 4 mg PRN Q8HRS  PRN IV NAUSEA/VOMITING;  Start 6/8/21 

at 15:45;  Stop 6/8/21 at 21:24;  Status DC


Fentanyl Citrate (Fentanyl 2ml Vial) 50 mcg PRN Q1HR  PRN IV PAIN;  Start 6/8/21

at 15:45;  Stop 6/9/21 at 06:54;  Status DC


Acetaminophen (Tylenol) 650 mg PRN Q4HRS  PRN PO FEVER > 100.3'F;  Start 6/8/21 

at 15:45;  Stop 6/8/21 at 21:23;  Status DC


Methylprednisolone Sodium Succinate (SOLU-Medrol 125MG VIAL) 60 mg Q6HRS IV  

Last administered on 6/11/21at 05:47;  Start 6/9/21 at 00:00;  Stop 6/11/21 at 

11:37;  Status DC


Diphenhydramine HCl (Benadryl) 25 mg Q4HRS IVP  Last administered on 6/11/21at 

09:01;  Start 6/8/21 at 20:00;  Stop 6/11/21 at 11:37;  Status DC


Epinephrine HCl (Adrenalin) 0.3 mg PRN Q5MIN  PRN IM ALLERGIC REACTION Last 

administered on 6/9/21at 05:20;  Start 6/8/21 at 16:15


Famotidine (Pepcid Vial) 20 mg BID IVP  Last administered on 6/20/21at 21:06;  

Start 6/8/21 at 16:30


Acetaminophen (Tylenol) 650 mg PRN Q6HRS  PRN PO Headaches, Temp > 101.5';  

Start 6/8/21 at 16:30


Lorazepam (Ativan Inj) 0.5 mg PRN Q6HRS  PRN IVP ANXIETY / AGITATION Last 

administered on 6/12/21at 09:50;  Start 6/8/21 at 16:30;  Stop 6/15/21 at 00:41;

 Status DC


Lorazepam (Ativan) 1 mg PRN Q6HRS  PRN PO ANXIETY / AGITATION;  Start 6/8/21 at 

16:30;  Stop 6/15/21 at 00:41;  Status DC


Ondansetron HCl (Zofran) 4 mg PRN Q6HRS  PRN IVP NAUSEA/VOMITING;  Start 6/8/21 

at 16:30


Al Hydroxide/Mg Hydroxide (Mylanta Plus Xs) 30 ml PRN Q3HRS  PRN PO HEARTBURN / 

GAS;  Start 6/8/21 at 16:30


Calcium Carbonate/ Glycine (Tums) 500 mg PRN Q3HRS  PRN PO HEARTBURN / GAS- 2ND 

CHOICE;  Start 6/8/21 at 16:30


Zolpidem Tartrate (Ambien) 5 mg PRN QHS  PRN PO INSOMNIA, MAY REPEAT IN 1HR;  

Start 6/8/21 at 16:30;  Stop 6/21/21 at 07:34;  Status DC


Enoxaparin Sodium (Lovenox 40mg Syringe) 40 mg Q24H SQ  Last administered on 

6/20/21at 21:05;  Start 6/8/21 at 21:00


Sodium Chloride (Normal Saline Flush) 3 ml QSHIFT  PRN IV AFTER MEDS AND BLOOD 

DRAWS;  Start 6/8/21 at 16:30


Acetaminophen/ Hydrocodone Bitart (Lortab 5/325) 1 tab PRN Q4HRS  PRN PO PAIN;  

Start 6/8/21 at 16:30;  Stop 6/15/21 at 00:41;  Status DC


Morphine Sulfate (Morphine Sulfate) 2 mg PRN Q1HR  PRN IV PAIN;  Start 6/8/21 at

16:30;  Stop 6/9/21 at 06:55;  Status DC


Magnesium Hydroxide (Milk Of Magnesia) 2,400 mg PRN Q12HR  PRN PO CONSTIPATION; 

Start 6/8/21 at 16:30


Albuterol/ Ipratropium (Duoneb) 3 ml RTQID  PRN NEB WHEEZING;  Start 6/8/21 at 

16:30;  Stop 6/8/21 at 21:23;  Status DC


Albuterol/ Ipratropium (Duoneb) 3 ml RTQID NEB  Last administered on 6/21/21at 

08:11;  Start 6/9/21 at 08:00


Succinylcholine Chloride (Anectine) 200 mg STK-MED ONCE .ROUTE ;  Start 6/9/21 

at 05:30;  Stop 6/9/21 at 06:02;  Status DC


Etomidate (Amidate) 20 mg STK-MED ONCE IV ;  Start 6/9/21 at 05:30;  Stop 6/9/21

at 06:02;  Status DC


Propofol 100 ml @  As Directed STK-MED ONCE IV ;  Start 6/9/21 at 05:50;  Stop 

6/9/21 at 06:02;  Status DC


Fentanyl Citrate 30 ml @ 0 mls/hr CONT  PRN IV SEE PROTOCOL Last administered on

6/11/21at 00:10;  Start 6/9/21 at 07:00;  Stop 6/11/21 at 12:31;  Status DC


Propofol 100 ml @ 0 mls/hr CONT  PRN IV PER PROTOCOL Last administered on 

6/11/21at 06:36;  Start 6/9/21 at 07:00;  Stop 6/11/21 at 12:31;  Status DC


Midazolam HCl (Versed) 5 mg STK-MED ONCE .ROUTE ;  Start 6/9/21 at 07:10;  Stop 

6/9/21 at 07:10;  Status DC


Midazolam HCl (Versed) 5 mg 1X  ONCE IV  Last administered on 6/9/21at 07:19;  

Start 6/9/21 at 07:15;  Stop 6/9/21 at 07:28;  Status DC


Succinylcholine Chloride (Anectine) 200 mg 1X  ONCE IV  Last administered on 

6/9/21at 05:30;  Start 6/9/21 at 08:00;  Stop 6/9/21 at 08:01;  Status DC


Etomidate (Amidate) 20 mg 1X  ONCE IV  Last administered on 6/9/21at 05:30;  

Start 6/9/21 at 08:00;  Stop 6/9/21 at 08:01;  Status DC


Insulin Glargine (Lantus Syringe) 5 unit QHS SQ  Last administered on 6/10/21at 

20:49;  Start 6/9/21 at 21:00;  Stop 6/11/21 at 12:31;  Status DC


Insulin Human Lispro (HumaLOG) 0-9 UNITS TIDWMEALS SQ ;  Start 6/9/21 at 17:00; 

Stop 6/9/21 at 14:37;  Status DC


Dextrose (Dextrose 50%-Water Syringe) 12.5 gm PRN Q15MIN  PRN IV SEE COMMENTS;  

Start 6/9/21 at 14:00;  Stop 6/11/21 at 12:31;  Status DC


Dextrose (Iv Dextrose 5%) 250 ml PRN Q15MIN  PRN IV SEE COMMENTS;  Start 6/9/21 

at 14:00;  Status UNV


Insulin Human Lispro (HumaLOG) 0-9 UNITS Q6HRS SQ ;  Start 6/9/21 at 18:00;  

Stop 6/11/21 at 12:31;  Status DC


Amino Acids/ Glycerin/ Electrolytes 1,000 ml @  80 mls/hr [id number] IV  Last 

administered on 6/18/21at 12:51;  Start 6/10/21 at 16:00;  Stop 6/18/21 at 

19:02;  Status DC


Hydralazine HCl (Apresoline Inj) 10 mg PRN Q4HRS  PRN IVP HYPERTENSION, 2nd 

CHOICE Last administered on 6/19/21at 23:07;  Start 6/11/21 at 11:45


Labetalol HCl (Normodyne Iv Push) 20 mg PRN Q2HR  PRN IVP HYPERTENSION, 1st 

CHOICE Last administered on 6/17/21at 00:41;  Start 6/11/21 at 11:45


Morphine Sulfate (Morphine Sulfate) 5 mg PRN Q2HR  PRN IV PAIN Last administered

on 6/12/21at 09:49;  Start 6/11/21 at 14:00


Doxycycline Hyclate (Vibra-Tab) 100 mg BID PO ;  Start 6/11/21 at 21:00;  Stop 

6/12/21 at 11:50;  Status DC


Prednisone (Prednisone) 30 mg DAILY PO ;  Start 6/12/21 at 09:00;  Stop 6/13/21 

at 09:14;  Status DC


Haloperidol Lactate (Haldol Inj) 5 mg PRN Q6HRS  PRN IVP AGITATION- 2ND CHOICE 

Last administered on 6/20/21at 00:07;  Start 6/11/21 at 20:15;  Stop 6/20/21 at 

10:13;  Status DC


Dexmedetomidine HCl 400 mcg/ Sodium Chloride 100 ml @  2.635 mls/ hr CONT  PRN 

IV PER PROTOCOL Last administered on 6/20/21at 12:38;  Start 6/11/21 at 22:30


Sodium Chloride 500 ml @  500 mls/hr 1X PRN  PRN IV SEE COMMENTS;  Start 6/11/21

at 22:30


Atropine Sulfate (ATROPINE 0.5mg SYRINGE) 0.5 mg PRN Q5MIN  PRN IV SEE COMMENTS;

 Start 6/11/21 at 22:30


Olanzapine (ZyPREXA IM) 10 mg DAILY IM  Last administered on 6/19/21at 07:47;  

Start 6/12/21 at 10:00;  Stop 6/20/21 at 10:13;  Status DC


Iohexol (Omnipaque 300 Mg/ml) 75 ml 1X  ONCE IV ;  Start 6/12/21 at 10:15;  Stop

6/12/21 at 10:16;  Status DC


Info (CONTRAST GIVEN -- Rx MONITORING) 1 each PRN DAILY  PRN MC SEE COMMENTS;  

Start 6/12/21 at 10:15;  Stop 6/14/21 at 10:14;  Status DC


Multivitamins 10 ml/Thiamine HCl 100 mg/Folic Acid 1 mg/Sodium Chloride 1,011.2 

ml  @ 100 mls/ hr DAILY IV  Last administered on 6/16/21at 09:10;  Start 6/12/21

at 11:00;  Stop 6/16/21 at 19:07;  Status DC


Lorazepam (Ativan) 4 mg PRN Q1HR  PRN PO For CIWA 8-14;  Start 6/12/21 at 10:30;

 Stop 6/15/21 at 00:41;  Status DC


Lorazepam (Ativan) 8 mg PRN Q1HR  PRN PO For CIWA 15 or greater;  Start 6/12/21 

at 10:30;  Stop 6/15/21 at 00:41;  Status DC


Clonidine HCl (Catapres) 0.1 mg PRN Q1HR  PRN PO SBP > 180 or DBP > 100, MRX3;  

Start 6/12/21 at 10:30


Lorazepam (Ativan Inj) 2 mg PRN Q15MIN  PRN IV SEE COMMENTS Last administered on

6/18/21at 17:39;  Start 6/12/21 at 10:30


Lorazepam (Ativan Inj) 4 mg PRN Q15MIN  PRN IV SEE COMMENTS Last administered on

6/20/21at 05:48;  Start 6/12/21 at 10:30


Doxycycline Hyclate 100 mg/ Dextrose 100 ml @  50 mls/hr Q12HR IV  Last 

administered on 6/14/21at 21:03;  Start 6/12/21 at 13:00;  Stop 6/15/21 at 

08:08;  Status DC


Ceftriaxone Sodium (Rocephin) 2 gm Q24H IVP  Last administered on 6/13/21at 

15:16;  Start 6/12/21 at 15:00;  Stop 6/14/21 at 08:26;  Status DC


Metronidazole 100 ml @  100 mls/hr Q12HR IV  Last administered on 6/13/21at 

20:42;  Start 6/12/21 at 21:00;  Stop 6/14/21 at 08:26;  Status DC


Methylprednisolone Sodium Succinate (SOLU-Medrol 40MG VIAL) 40 mg DAILY IV  Last

administered on 6/20/21at 09:12;  Start 6/13/21 at 10:00


Info ([facility] Electrolyte Protocol) 1 ea DAILY08  PRN MC PER PROTOCOL;  Start 

6/13/21 at 11:00


Potassium Chloride/Water 100 ml @  100 mls/hr Q1H IV  Last administered on 

6/13/21at 15:16;  Start 6/13/21 at 12:00;  Stop 6/13/21 at 15:59;  Status DC


Potassium Chloride/Water 100 ml @  100 mls/hr Q1H IV ;  Start 6/13/21 at 11:15; 

Stop 6/13/21 at 11:36;  Status DC


Potassium Chloride/Water 100 ml @  100 mls/hr Q1H IV ;  Start 6/13/21 at 11:15; 

Stop 6/13/21 at 11:36;  Status DC


Magnesium Sulfate 100 ml @  50 mls/hr DAILY IV ;  Start 6/14/21 at 09:00;  Stop 

6/13/21 at 11:36;  Status DC


Sodium Phosphate 8.33 mmol/Sodium Chloride 252.7767 ml @ 62.5 mls/hr 1X  ONCE IV

;  Start 6/13/21 at 11:15;  Stop 6/13/21 at 11:36;  Status DC


Piperacillin Sod/ Tazobactam Sod 3.375 gm/Sodium Chloride 50 ml @  100 mls/hr 

Q6HRS IV  Last administered on 6/21/21at 05:59;  Start 6/14/21 at 12:00


Diphenhydramine HCl (Benadryl) 25 mg PRN Q15MIN  PRN IVP EPS symptoms 2'Haldol 

admin Last administered on 6/20/21at 05:48;  Start 6/14/21 at 16:45


Linezolid/Dextrose 300 ml @  300 mls/hr Q12HR IV  Last administered on 6/20/21at

21:06;  Start 6/15/21 at 09:00


Lidocaine HCl (Lidocaine 2% Viscous) 100 ml PRN 1X  PRN MM FOR PROCEDURE Last 

administered on 6/17/21at 10:02;  Start 6/17/21 at 08:30;  Stop 6/18/21 at 

08:29;  Status DC


Lidocaine HCl (Lidocaine 1% 20ml Vial) 20 ml PRN 1X  PRN INJ SEE COMMENTS Last 

administered on 6/17/21at 10:03;  Start 6/17/21 at 08:30;  Stop 6/18/21 at 

08:29;  Status DC


Lidocaine HCl (Lidocaine 1% 20ml Vial) 20 ml STK-MED ONCE .ROUTE ;  Start 

6/17/21 at 08:38;  Stop 6/17/21 at 08:38;  Status DC


Lidocaine HCl (Lidocaine 2% Viscous) 100 ml STK-MED ONCE .ROUTE ;  Start 6/17/21

at 08:38;  Stop 6/17/21 at 08:38;  Status DC


Midazolam HCl (Versed) 5 mg 1X  ONCE IV  Last administered on 6/17/21at 11:59;  

Start 6/17/21 at 10:30;  Stop 6/17/21 at 10:31;  Status DC


Ketorolac Tromethamine (Toradol 15mg Vial) 15 mg 1X  ONCE IVP  Last administered

on 6/18/21at 11:44;  Start 6/18/21 at 11:30;  Stop 6/18/21 at 11:31;  Status DC


Potassium Chloride/Water 100 ml @  100 mls/hr Q1H IV  Last administered on 

6/20/21at 14:31;  Start 6/20/21 at 04:00;  Stop 6/20/21 at 11:59;  Status DC


Haloperidol Lactate (Haldol Inj) 3 mg PRN Q6HRS  PRN IVP AGITATION- 1st CHOICE 

Last administered on 6/20/21at 12:37;  Start 6/20/21 at 10:15


Olanzapine (ZyPREXA IM) 10 mg HS IM  Last administered on 6/20/21at 21:06;  

Start 6/20/21 at 21:00


Metoprolol Succinate (Toprol Xl) 25 mg DAILY PO ;  Start 6/21/21 at 09:00


Albuterol Sulfate (Ventolin Neb Soln) 2.5 mg PRN Q4HRS  PRN NEB SHORTNESS OF 

BREATH;  Start 6/21/21 at 08:00


Gabapentin (Neurontin) 300 mg TID PO ;  Start 6/21/21 at 09:00





Active Scripts


Active


Amlodipine Besylate 10 Mg Tablet 10 Mg PO DAILY 30 Days


Reported


Metoprolol Succinate ( Xl ) (Metoprolol Succinate) 25 Mg Tab.er.24h 25 Mg PO 

DAILY


Hydrochlorothiazide Tablet (Hydrochlorothiazide) 12.5 Mg Tablet 12.5 Mg PO DAILY


Ventolin Hfa Inhaler (Albuterol Sulfate) 18 Gm Hfa.aer.ad 2 Puff INH PRN Q4HRS 

PRN


Vitals/I & O





Vital Sign - Last 24 Hours








 6/20/21 6/20/21 6/20/21 6/20/21





 10:13 11:04 12:00 12:10


 


Pulse 71 69  


 


Resp 14 17  


 


B/P (MAP) 113/81 (92) 134/86 (102)  


 


Pulse Ox 99 98  98


 


O2 Delivery Room Air Room Air Room Air Room Air





 6/20/21 6/20/21 6/20/21 6/20/21





 12:30 12:59 14:00 15:10


 


Temp 97.3   





 97.3   


 


Pulse 68 70 68 92


 


Resp 22 16 24 18


 


B/P (MAP) 125/87 (100) 136/94 (108) 117/85 (96) 104/76 (85)


 


Pulse Ox 100 97 96 94


 


O2 Delivery Room Air Room Air Room Air Room Air


 


    





    





 6/20/21 6/20/21 6/20/21 6/20/21





 15:48 16:00 16:12 17:00


 


Temp   97.3 





   97.3 


 


Pulse   92 110


 


Resp   20 20


 


B/P (MAP)   127/73 (91) 124/82 (96)


 


Pulse Ox  98 98 99


 


O2 Delivery Room Air Room Air Room Air Room Air


 


    





    





 6/20/21 6/20/21 6/20/21 6/20/21





 18:34 19:00 20:00 20:00


 


Temp    98.3





    98.3


 


Pulse 92 90  90


 


Resp 18 16  20


 


B/P (MAP) 141/84 (103) 121/85 (97)  143/85 (104)


 


Pulse Ox 99 99  99


 


O2 Delivery Room Air Room Air Room Air Nasal Cannula


 


    





    





 6/20/21 6/20/21 6/20/21 6/20/21





 20:36 21:00 22:00 23:00


 


Pulse  96 118 96


 


Resp  18 18 18


 


B/P (MAP)  144/88 (106) 140/84 (102) 131/82 (98)


 


Pulse Ox 98 99 99 98


 


O2 Delivery Room Air Room Air Room Air Room Air





 6/21/21 6/21/21 6/21/21 6/21/21





 00:00 00:00 01:00 02:00


 


Temp  98.0  





  98.0  


 


Pulse  90 86 78


 


Resp  16 16 18


 


B/P (MAP)  131/86 (101) 141/86 (104) 147/87 (107)


 


Pulse Ox  97 97 97


 


O2 Delivery Room Air Room Air Room Air Room Air


 


    





    





 6/21/21 6/21/21 6/21/21 6/21/21





 03:00 04:00 04:00 05:00


 


Temp   98.4 





   98.4 


 


Pulse 72  80 69


 


Resp 16  16 16


 


B/P (MAP) 166/88 (114)  145/78 (100) 163/92 (115)


 


Pulse Ox 98  98 99


 


O2 Delivery Room Air Room Air Room Air Room Air


 


    





    





 6/21/21 6/21/21 6/21/21 





 06:00 07:00 08:12 


 


Pulse 90 81  


 


Resp 18 18  


 


B/P (MAP) 158/92 (114) 181/109 (133)  


 


Pulse Ox 98 97 97 


 


O2 Delivery Room Air Room Air Room Air 














Intake and Output   


 


 6/20/21 6/20/21 6/21/21





 15:00 23:00 07:00


 


Intake Total 500 ml 300 ml 480.09 ml


 


Output Total 440 ml 550 ml 455 ml


 


Balance 60 ml -250 ml 25.09 ml











Justicifation of Admission Dx:


Justifications for Admission:


Justification of Admission Dx:  Yes











TED VU MD           Jun 21, 2021 09:09

## 2021-06-21 NOTE — NUR
SS following up with discharge planning. SS reviewed pt chart and discussed with pt RN. Pt 
is currently on room air. Trach removed. Pt on PO antibiotics. PT recommended home. Home 
Healthcare recommended for RN and PT. Pt accepted on services with [facility], 
[phone]; fax [phone]. Probable discharge to home later today. SS will continue to 
follow for discharge planning. 

-------------------------------------------------------------------------------

Addendum: 06/21/21 at 1639 by YISEL CASILLAS SS

-------------------------------------------------------------------------------

Discharge orders received for home healthcare. SS phoned and faxed discharge orders to 
[facility], [phone]; fax [phone].

## 2021-06-21 NOTE — PDOC
Infectious Disease Note


Subjective:


Subjective


Patient alert awake sitting at the edge of the bed in no acute distress


[place]


Denies fever, nausea, vomiting, diarrhea, abdominal pain, worsening shortness of

breath or cough


d/w RN





Vital Signs:


Vital Signs





Vital Signs








  Date Time  Temp Pulse Resp B/P (MAP) Pulse Ox O2 Delivery O2 Flow Rate FiO2


 


6/21/21 06:00  90 18 158/92 (114) 98 Room Air  


 


6/21/21 04:00 98.4       





 98.4       











Physical Exam:


PHYSICAL EXAM


GENERAL: Alert awake sitting in  bed


HEENT:  Pupils small.  Normal conjunctivae.  Oral cavity pink.


NECK:  Tracheostomy removed


LUNGS:  scattered Rhonchi.  No accessory muscle use.


HEART:  S1 and S2, regular.


ABDOMEN:  Nondistended, soft.  No grimace or guarding to palpation.


GENITOURINARY:  Indwelling Ramirez in place.


EXTREMITIES:  No gross edema or cyanosis. Mitts


CNS alert awake


SKIN:  Warm to touch.  No signs of rash.  Peripheral IVs look okay.





Medications:


Inpatient Meds:


Medications reviewed.





Labs:


Lab





Laboratory Tests








Test


 6/21/21


06:30


 


Sodium Level


 142 mmol/L


(136-145)


 


Potassium Level


 3.4 mmol/L


(3.5-5.1)


 


Chloride Level


 106 mmol/L


()


 


Carbon Dioxide Level


 24 mmol/L


(21-32)


 


Anion Gap 12 (6-14) 


 


Blood Urea Nitrogen


 12 mg/dL


(8-26)


 


Creatinine


 0.8 mg/dL


(0.7-1.3)


 


Estimated GFR


(Cockcroft-Gault) 120.6 





 


Glucose Level


 91 mg/dL


(70-99)


 


Calcium Level


 8.9 mg/dL


(8.5-10.1)








Micro


cxr 6/16








COMPARISON: CT chest dated 6/12/2021





INDICATION:55 years, Male, tracheostomy tube..





FINDINGS: 


Tracheostomy tube tip terminates approximately 7.4 cm proximal to the noah. 

Normal cardiomediastinal silhouette. Unchanged large opacity in the right lung 

apex with bronchiectatic changes and fibrosis. Similar multifocal patchy and 

reticular opacities throughout the right lung. Similar pulmonary emphysema. The 

right apical pulmonary nodule is not appreciated on the current exam. Similar 

left basilar subsegmental atelectasis. No pleural effusion or pneumothorax. No 

acute osseous process.





IMPRESSION:


1. Similar appearance of the large opacity with atelectatic and fibrotic changes

in the right lung.


2. Patchy airspace opacity in the left lung base, may represent atelectasis roxie

olesya infiltrates.


3. Tracheostomy tube tip locates approximately 7.4 cm proximal to the noah.








CT CHEST





FINDINGS:





Lungs and Airways: Stable right apical consolidation with architectural 

distortion, bronchiectasis, calcification, and intracavitary density. Additional

right apical spiculated 2.4 cm nodule is stable from recent exams, but decreased

in size from 2013. Hyperexpanded left lung. Calcified pulmonary granulomas. New 

left lower lobe dependent consolidation. Tracheostomy.





Pleura: The pleural spaces are normal.





Heart and Mediastinum: The visualized thyroid is normal in size and attenuation.

No axillary or supraclavicular lymphadenopathy. No mediastinal, hilar or 

retrocrural lymphadenopathy. Calcified mediastinal lymph nodes consistent with 

remote granulomatous disease The heart and pericardium are within normal limits.

Ectatic ascending thoracic aorta measures 4 cm at the level of the right 

pulmonary artery. 





Abdomen: Hepatic steatosis.





Bones and Soft Tissues: Degenerative changes of the spine.





IMPRESSION:


  


1. New left lower lobe dependent consolidation, which may represent infection or

aspiration.





2. Stable right apical cavitation with intracavitary lesion which could 

represent aspergilloma. Additional right apical spiculated 2.4 cm nodule is 

stable from recent exams, but decreased in size from 2013.





Objective:


Assessment:


1.  Chronic cavitation, right upper lobe lung lesion with LLL infiltrates


    differential would be broad with bacterial,fungal or mycobacterial including

nonmycobacterial vs noninfectious


Sputum cultures NRF


Status post bronchoscopy Gram stain GPC


2.  History of Mycobacterium tuberculosis, treated in 2008.  AFB negative in 


2017 and 2019. HIV negative 2017


3.  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


    Aspergillus antigen 0.46


4.  Leukocytosis in part reactive to steroids.- better


5.  Angioedema from Lisinopril ;


    Acute respiratory failure, status post emergent tracheostomy on 06/09/2021.


6.  Hypertension.


7.  Chronic obstructive pulmonary disease.


8.  Tobaccoism and substance abuse.


9.  Alcohol abuse


10. Thrombocytopenia


11. PCM


12. Anemia





Plan:


Plan of Care





 Cont Zosyn 6/14 and Zyvox 6/15


F/U cult and labs


sputum cult NRF


aggressive pulm toilet


steroids per pulmonary


S/P bronchoscopy 6/17 GPC's, cultures NRF


Bronch AFB pending


Monitor labs


 Maintain aspiration precautions 





Discussed with nursing











STEPHANIE CLAYTON MD           Jun 21, 2021 07:14

## 2021-06-21 NOTE — NUR
Discharge Note:



CHRISTOPHER HUBBARD [address] ICU



Discharge instructions and discharge home medications reviewed with Patient and a copy 
given. All questions have been answered and understanding verbalized.

## 2025-01-03 NOTE — PATHOLOGY
CYTOPATHOLOGY REPORT

 

CLINICAL HISTORY:

Active TB, Hemoptysis     

 

SPECIMEN(S) RECEIVED:

A.Bronchoalveolar lavage, RUL

 

FINAL DIAGNOSIS:

A. Bronchoalveolar lavage, RUL:

 - No malignant cells identified.  - Normal and reactive bronchial

epithelial cells present. Alveolar macrophages and squamous cells

present.

- No evidence of fungal organisms on GMS stain.  

 

PATHOLOGIST:   BRYANT Harris M.D.

REPORT ELECTRONICALLY SIGNED BY:   BRYANT Harris M.D.

DATE/TIME:   10/6/2017 16:01

GROSS PATHOLOGY:

A.  Bronchoalveolar lavage, RUL:  The specimen is submitted unfixed,

labeled "Kelton Hubbard".  Received by the Cytology Department is 15

mL of cloudy red fluid.  One ThinPrep slide and a formalin fixed cell

block were prepared. Silver stain will be performed on cell block. 

(mm 2017)

 

 

CYTOTECHNOLOGIST(S):  ELISABETH Julien(ASCP)

INITIAL CPT CODE(S):

A; 37085, 84772, 64984

Professional services performed by [facility] [place] 

[place] [address]

 

  Technical services performed by [facility] at [address] [place] [address], [place]

[address] [address].

 

 

PATIENT:   KELTON HUBBARD

/AGE:   1965 (Age: 51)     SEX:   M

PATIENT #:   [id number]

ACCESSION #:   [id number]       ALT CASE #:   

SPECIMEN COLLECTION DATE:   2017

SPECIMEN RECEIVED DATE:   2017

[facility]

[address] [place] [address], [address]

[place] [address]

PHONE:  [phone]

DIRECTOR:  Spencer W. Kerley, M.D.

* * *  END OF REPORT  * * * none